# Patient Record
Sex: MALE | Race: WHITE | HISPANIC OR LATINO | Employment: OTHER | URBAN - METROPOLITAN AREA
[De-identification: names, ages, dates, MRNs, and addresses within clinical notes are randomized per-mention and may not be internally consistent; named-entity substitution may affect disease eponyms.]

---

## 2018-12-17 ENCOUNTER — HOSPITAL ENCOUNTER (OUTPATIENT)
Facility: HOSPITAL | Age: 73
Discharge: HOME | End: 2018-12-18
Attending: EMERGENCY MEDICINE | Admitting: EMERGENCY MEDICINE
Payer: MEDICARE

## 2018-12-17 DIAGNOSIS — R06.02 SOB (SHORTNESS OF BREATH): ICD-10-CM

## 2018-12-17 DIAGNOSIS — F02.818 DEMENTIA DUE TO PARKINSON'S DISEASE WITH BEHAVIORAL DISTURBANCE: Chronic | ICD-10-CM

## 2018-12-17 DIAGNOSIS — N40.1 BPH WITH URINARY OBSTRUCTION: ICD-10-CM

## 2018-12-17 DIAGNOSIS — R41.82 ALTERED MENTAL STATUS: ICD-10-CM

## 2018-12-17 DIAGNOSIS — Z86.73 HISTORY OF CVA (CEREBROVASCULAR ACCIDENT): Chronic | ICD-10-CM

## 2018-12-17 DIAGNOSIS — G20.A1 DEMENTIA DUE TO PARKINSON'S DISEASE WITH BEHAVIORAL DISTURBANCE: Chronic | ICD-10-CM

## 2018-12-17 DIAGNOSIS — N41.0 ACUTE PROSTATITIS: Primary | ICD-10-CM

## 2018-12-17 DIAGNOSIS — G93.40 ACUTE ENCEPHALOPATHY: ICD-10-CM

## 2018-12-17 DIAGNOSIS — R32 URINARY INCONTINENCE, UNSPECIFIED TYPE: ICD-10-CM

## 2018-12-17 DIAGNOSIS — N13.8 BPH WITH URINARY OBSTRUCTION: ICD-10-CM

## 2018-12-17 LAB
BASOPHILS # BLD AUTO: 0.01 K/UL
BASOPHILS NFR BLD: 0.1 %
DIFFERENTIAL METHOD: ABNORMAL
EOSINOPHIL # BLD AUTO: 0 K/UL
EOSINOPHIL NFR BLD: 0.1 %
ERYTHROCYTE [DISTWIDTH] IN BLOOD BY AUTOMATED COUNT: 13.2 %
HCT VFR BLD AUTO: 39.6 %
HGB BLD-MCNC: 13.2 G/DL
INR PPP: 1.1
LYMPHOCYTES # BLD AUTO: 1 K/UL
LYMPHOCYTES NFR BLD: 6.6 %
MCH RBC QN AUTO: 30.3 PG
MCHC RBC AUTO-ENTMCNC: 33.3 G/DL
MCV RBC AUTO: 91 FL
MONOCYTES # BLD AUTO: 1.4 K/UL
MONOCYTES NFR BLD: 9.8 %
NEUTROPHILS # BLD AUTO: 11.9 K/UL
NEUTROPHILS NFR BLD: 83.4 %
PLATELET # BLD AUTO: 218 K/UL
PMV BLD AUTO: 10.6 FL
POCT GLUCOSE: 87 MG/DL (ref 70–110)
PROTHROMBIN TIME: 11.5 SEC
RBC # BLD AUTO: 4.36 M/UL
WBC # BLD AUTO: 14.31 K/UL

## 2018-12-17 PROCEDURE — 83735 ASSAY OF MAGNESIUM: CPT

## 2018-12-17 PROCEDURE — 80053 COMPREHEN METABOLIC PANEL: CPT

## 2018-12-17 PROCEDURE — 25000003 PHARM REV CODE 250: Performed by: EMERGENCY MEDICINE

## 2018-12-17 PROCEDURE — 83605 ASSAY OF LACTIC ACID: CPT

## 2018-12-17 PROCEDURE — 93005 ELECTROCARDIOGRAM TRACING: CPT

## 2018-12-17 PROCEDURE — 83690 ASSAY OF LIPASE: CPT

## 2018-12-17 PROCEDURE — 99285 EMERGENCY DEPT VISIT HI MDM: CPT | Mod: 25

## 2018-12-17 PROCEDURE — 96361 HYDRATE IV INFUSION ADD-ON: CPT

## 2018-12-17 PROCEDURE — 87040 BLOOD CULTURE FOR BACTERIA: CPT

## 2018-12-17 PROCEDURE — 82962 GLUCOSE BLOOD TEST: CPT

## 2018-12-17 PROCEDURE — 84484 ASSAY OF TROPONIN QUANT: CPT

## 2018-12-17 PROCEDURE — 83880 ASSAY OF NATRIURETIC PEPTIDE: CPT

## 2018-12-17 PROCEDURE — 85025 COMPLETE CBC W/AUTO DIFF WBC: CPT

## 2018-12-17 PROCEDURE — 87400 INFLUENZA A/B EACH AG IA: CPT | Mod: 59

## 2018-12-17 PROCEDURE — 81000 URINALYSIS NONAUTO W/SCOPE: CPT

## 2018-12-17 PROCEDURE — 93010 ELECTROCARDIOGRAM REPORT: CPT | Mod: ,,, | Performed by: INTERNAL MEDICINE

## 2018-12-17 PROCEDURE — 85610 PROTHROMBIN TIME: CPT

## 2018-12-17 RX ADMIN — SODIUM CHLORIDE 1362 ML: 0.9 INJECTION, SOLUTION INTRAVENOUS at 11:12

## 2018-12-18 VITALS
OXYGEN SATURATION: 98 % | RESPIRATION RATE: 18 BRPM | WEIGHT: 105.81 LBS | HEART RATE: 59 BPM | HEIGHT: 66 IN | DIASTOLIC BLOOD PRESSURE: 66 MMHG | BODY MASS INDEX: 17 KG/M2 | SYSTOLIC BLOOD PRESSURE: 119 MMHG | TEMPERATURE: 97 F

## 2018-12-18 PROBLEM — R35.0 URINARY FREQUENCY: Status: ACTIVE | Noted: 2018-12-18

## 2018-12-18 PROBLEM — N41.0 ACUTE PROSTATITIS: Status: ACTIVE | Noted: 2018-12-18

## 2018-12-18 PROBLEM — N40.1 BPH WITH URINARY OBSTRUCTION: Status: ACTIVE | Noted: 2018-12-18

## 2018-12-18 PROBLEM — F03.918 DEMENTIA WITH BEHAVIORAL DISTURBANCE: Chronic | Status: ACTIVE | Noted: 2018-12-18

## 2018-12-18 PROBLEM — F03.918 DEMENTIA WITH BEHAVIORAL DISTURBANCE: Status: ACTIVE | Noted: 2018-12-18

## 2018-12-18 PROBLEM — G93.40 ACUTE ENCEPHALOPATHY: Status: ACTIVE | Noted: 2018-12-18

## 2018-12-18 PROBLEM — Z86.73 HISTORY OF CVA (CEREBROVASCULAR ACCIDENT): Chronic | Status: ACTIVE | Noted: 2018-12-18

## 2018-12-18 PROBLEM — N13.8 BPH WITH URINARY OBSTRUCTION: Status: ACTIVE | Noted: 2018-12-18

## 2018-12-18 LAB
ALBUMIN SERPL BCP-MCNC: 3 G/DL
ALBUMIN SERPL BCP-MCNC: 3.7 G/DL
ALP SERPL-CCNC: 107 U/L
ALP SERPL-CCNC: 121 U/L
ALT SERPL W/O P-5'-P-CCNC: 37 U/L
ALT SERPL W/O P-5'-P-CCNC: 43 U/L
ANION GAP SERPL CALC-SCNC: 11 MMOL/L
ANION GAP SERPL CALC-SCNC: 9 MMOL/L
AST SERPL-CCNC: 106 U/L
AST SERPL-CCNC: 90 U/L
BASOPHILS # BLD AUTO: 0 K/UL
BASOPHILS NFR BLD: 0 %
BILIRUB SERPL-MCNC: 1.5 MG/DL
BILIRUB SERPL-MCNC: 1.9 MG/DL
BILIRUB UR QL STRIP: NEGATIVE
BNP SERPL-MCNC: 80 PG/ML
BUN SERPL-MCNC: 21 MG/DL
BUN SERPL-MCNC: 26 MG/DL
CALCIUM SERPL-MCNC: 8.2 MG/DL
CALCIUM SERPL-MCNC: 9.2 MG/DL
CHLORIDE SERPL-SCNC: 104 MMOL/L
CHLORIDE SERPL-SCNC: 110 MMOL/L
CLARITY UR: CLEAR
CO2 SERPL-SCNC: 23 MMOL/L
CO2 SERPL-SCNC: 27 MMOL/L
COLOR UR: YELLOW
COMPLEXED PSA SERPL-MCNC: 13.4 NG/ML
CREAT SERPL-MCNC: 0.8 MG/DL
CREAT SERPL-MCNC: 0.9 MG/DL
DIFFERENTIAL METHOD: ABNORMAL
EOSINOPHIL # BLD AUTO: 0 K/UL
EOSINOPHIL NFR BLD: 0.2 %
ERYTHROCYTE [DISTWIDTH] IN BLOOD BY AUTOMATED COUNT: 13.3 %
EST. GFR  (AFRICAN AMERICAN): >60 ML/MIN/1.73 M^2
EST. GFR  (AFRICAN AMERICAN): >60 ML/MIN/1.73 M^2
EST. GFR  (NON AFRICAN AMERICAN): >60 ML/MIN/1.73 M^2
EST. GFR  (NON AFRICAN AMERICAN): >60 ML/MIN/1.73 M^2
FLUAV AG SPEC QL IA: NEGATIVE
FLUBV AG SPEC QL IA: NEGATIVE
GLUCOSE SERPL-MCNC: 85 MG/DL
GLUCOSE SERPL-MCNC: 89 MG/DL
GLUCOSE UR QL STRIP: NEGATIVE
GRAN CASTS #/AREA URNS LPF: 1 /LPF
HCT VFR BLD AUTO: 37.5 %
HGB BLD-MCNC: 12.4 G/DL
HGB UR QL STRIP: ABNORMAL
HYALINE CASTS #/AREA URNS LPF: 4 /LPF
KETONES UR QL STRIP: ABNORMAL
LACTATE SERPL-SCNC: 1.8 MMOL/L
LEUKOCYTE ESTERASE UR QL STRIP: NEGATIVE
LIPASE SERPL-CCNC: 82 U/L
LYMPHOCYTES # BLD AUTO: 1 K/UL
LYMPHOCYTES NFR BLD: 7.7 %
MAGNESIUM SERPL-MCNC: 2.1 MG/DL
MAGNESIUM SERPL-MCNC: 2.3 MG/DL
MCH RBC QN AUTO: 30.2 PG
MCHC RBC AUTO-ENTMCNC: 33.1 G/DL
MCV RBC AUTO: 91 FL
MICROSCOPIC COMMENT: ABNORMAL
MONOCYTES # BLD AUTO: 1.4 K/UL
MONOCYTES NFR BLD: 11.3 %
NEUTROPHILS # BLD AUTO: 10.4 K/UL
NEUTROPHILS NFR BLD: 80.8 %
NITRITE UR QL STRIP: NEGATIVE
NON-SQ EPI CELLS #/AREA URNS HPF: 1 /HPF
PH UR STRIP: 5 [PH] (ref 5–8)
PHOSPHATE SERPL-MCNC: 2.2 MG/DL
PLATELET # BLD AUTO: 196 K/UL
PMV BLD AUTO: 10.1 FL
POTASSIUM SERPL-SCNC: 3.7 MMOL/L
POTASSIUM SERPL-SCNC: 3.9 MMOL/L
PROT SERPL-MCNC: 6.2 G/DL
PROT SERPL-MCNC: 7.3 G/DL
PROT UR QL STRIP: NEGATIVE
RBC # BLD AUTO: 4.11 M/UL
RBC #/AREA URNS HPF: 5 /HPF (ref 0–4)
SODIUM SERPL-SCNC: 142 MMOL/L
SODIUM SERPL-SCNC: 142 MMOL/L
SP GR UR STRIP: 1.02 (ref 1–1.03)
SPECIMEN SOURCE: NORMAL
SQUAMOUS #/AREA URNS HPF: 1 /HPF
TROPONIN I SERPL DL<=0.01 NG/ML-MCNC: <0.006 NG/ML
TROPONIN I SERPL DL<=0.01 NG/ML-MCNC: <0.006 NG/ML
URN SPEC COLLECT METH UR: ABNORMAL
UROBILINOGEN UR STRIP-ACNC: ABNORMAL EU/DL
WBC # BLD AUTO: 12.8 K/UL
WBC #/AREA URNS HPF: 2 /HPF (ref 0–5)
WBC CASTS #/AREA URNS LPF: ABNORMAL /LPF

## 2018-12-18 PROCEDURE — 85025 COMPLETE CBC W/AUTO DIFF WBC: CPT

## 2018-12-18 PROCEDURE — 25000003 PHARM REV CODE 250: Performed by: EMERGENCY MEDICINE

## 2018-12-18 PROCEDURE — 25500020 PHARM REV CODE 255: Performed by: EMERGENCY MEDICINE

## 2018-12-18 PROCEDURE — 80053 COMPREHEN METABOLIC PANEL: CPT

## 2018-12-18 PROCEDURE — G0378 HOSPITAL OBSERVATION PER HR: HCPCS

## 2018-12-18 PROCEDURE — 36415 COLL VENOUS BLD VENIPUNCTURE: CPT

## 2018-12-18 PROCEDURE — 94761 N-INVAS EAR/PLS OXIMETRY MLT: CPT

## 2018-12-18 PROCEDURE — 25000003 PHARM REV CODE 250: Performed by: INTERNAL MEDICINE

## 2018-12-18 PROCEDURE — 96361 HYDRATE IV INFUSION ADD-ON: CPT

## 2018-12-18 PROCEDURE — 96365 THER/PROPH/DIAG IV INF INIT: CPT

## 2018-12-18 PROCEDURE — 84100 ASSAY OF PHOSPHORUS: CPT

## 2018-12-18 PROCEDURE — 25000003 PHARM REV CODE 250: Performed by: NURSE PRACTITIONER

## 2018-12-18 PROCEDURE — 83735 ASSAY OF MAGNESIUM: CPT

## 2018-12-18 PROCEDURE — 51798 US URINE CAPACITY MEASURE: CPT

## 2018-12-18 PROCEDURE — 99203 OFFICE O/P NEW LOW 30 MIN: CPT | Mod: ,,, | Performed by: UROLOGY

## 2018-12-18 PROCEDURE — 63600175 PHARM REV CODE 636 W HCPCS: Performed by: EMERGENCY MEDICINE

## 2018-12-18 PROCEDURE — 84153 ASSAY OF PSA TOTAL: CPT

## 2018-12-18 RX ORDER — CIPROFLOXACIN 500 MG/1
500 TABLET ORAL EVERY 12 HOURS
Qty: 28 TABLET | Refills: 0 | Status: ON HOLD | OUTPATIENT
Start: 2018-12-18 | End: 2019-01-11

## 2018-12-18 RX ORDER — ACETAMINOPHEN 500 MG
500 TABLET ORAL EVERY 6 HOURS PRN
Status: DISCONTINUED | OUTPATIENT
Start: 2018-12-18 | End: 2018-12-18 | Stop reason: HOSPADM

## 2018-12-18 RX ORDER — FAMOTIDINE 20 MG/1
20 TABLET, FILM COATED ORAL 2 TIMES DAILY
Status: CANCELLED | OUTPATIENT
Start: 2018-12-18

## 2018-12-18 RX ORDER — AMOXICILLIN 250 MG
1 CAPSULE ORAL 2 TIMES DAILY PRN
Status: DISCONTINUED | OUTPATIENT
Start: 2018-12-18 | End: 2018-12-18 | Stop reason: HOSPADM

## 2018-12-18 RX ORDER — CIPROFLOXACIN 500 MG/1
500 TABLET ORAL EVERY 12 HOURS
Status: DISCONTINUED | OUTPATIENT
Start: 2018-12-18 | End: 2018-12-18 | Stop reason: HOSPADM

## 2018-12-18 RX ORDER — SODIUM CHLORIDE 9 MG/ML
INJECTION, SOLUTION INTRAVENOUS CONTINUOUS
Status: DISCONTINUED | OUTPATIENT
Start: 2018-12-18 | End: 2018-12-18

## 2018-12-18 RX ORDER — ENOXAPARIN SODIUM 100 MG/ML
40 INJECTION SUBCUTANEOUS EVERY 24 HOURS
Status: DISCONTINUED | OUTPATIENT
Start: 2018-12-18 | End: 2018-12-18 | Stop reason: HOSPADM

## 2018-12-18 RX ORDER — ATORVASTATIN CALCIUM 40 MG/1
40 TABLET, FILM COATED ORAL DAILY
COMMUNITY

## 2018-12-18 RX ORDER — PHENAZOPYRIDINE HYDROCHLORIDE 100 MG/1
100 TABLET, FILM COATED ORAL 3 TIMES DAILY PRN
Qty: 15 TABLET | Refills: 0 | Status: SHIPPED | OUTPATIENT
Start: 2018-12-18 | End: 2018-12-23

## 2018-12-18 RX ORDER — TAMSULOSIN HYDROCHLORIDE 0.4 MG/1
0.4 CAPSULE ORAL DAILY
Status: DISCONTINUED | OUTPATIENT
Start: 2018-12-18 | End: 2018-12-18 | Stop reason: HOSPADM

## 2018-12-18 RX ORDER — ONDANSETRON 2 MG/ML
8 INJECTION INTRAMUSCULAR; INTRAVENOUS EVERY 8 HOURS PRN
Status: DISCONTINUED | OUTPATIENT
Start: 2018-12-18 | End: 2018-12-18 | Stop reason: HOSPADM

## 2018-12-18 RX ORDER — DEXTROMETHORPHAN POLISTIREX 30 MG/5 ML
1 SUSPENSION, EXTENDED RELEASE 12 HR ORAL ONCE
Status: COMPLETED | OUTPATIENT
Start: 2018-12-18 | End: 2018-12-18

## 2018-12-18 RX ORDER — RAMELTEON 8 MG/1
8 TABLET ORAL NIGHTLY PRN
Status: DISCONTINUED | OUTPATIENT
Start: 2018-12-18 | End: 2018-12-18 | Stop reason: HOSPADM

## 2018-12-18 RX ORDER — TAMSULOSIN HYDROCHLORIDE 0.4 MG/1
0.4 CAPSULE ORAL DAILY
Qty: 30 CAPSULE | Refills: 3 | Status: SHIPPED | OUTPATIENT
Start: 2018-12-19 | End: 2019-12-19

## 2018-12-18 RX ADMIN — MINERAL OIL 1 ENEMA: 100 ENEMA RECTAL at 09:12

## 2018-12-18 RX ADMIN — CIPROFLOXACIN HYDROCHLORIDE 500 MG: 500 TABLET, FILM COATED ORAL at 09:12

## 2018-12-18 RX ADMIN — SODIUM CHLORIDE 1000 ML: 0.9 INJECTION, SOLUTION INTRAVENOUS at 12:12

## 2018-12-18 RX ADMIN — CEFTRIAXONE 1 G: 1 INJECTION, SOLUTION INTRAVENOUS at 03:12

## 2018-12-18 RX ADMIN — SODIUM CHLORIDE: 0.9 INJECTION, SOLUTION INTRAVENOUS at 05:12

## 2018-12-18 RX ADMIN — RAMELTEON 8 MG: 8 TABLET, FILM COATED ORAL at 05:12

## 2018-12-18 RX ADMIN — IOHEXOL 70 ML: 350 INJECTION, SOLUTION INTRAVENOUS at 02:12

## 2018-12-18 RX ADMIN — TAMSULOSIN HYDROCHLORIDE 0.4 MG: 0.4 CAPSULE ORAL at 09:12

## 2018-12-18 NOTE — HPI
Abnormal Prostate on CT  He developed urinary frequency and urinary incontinence.  He is unable to provide a history.  History is from his son at the bedside.  The change in voiding symptoms seems to be sudden.  No fever or complaints of pain.

## 2018-12-18 NOTE — PROGRESS NOTES
Follow-up Information     [facility]. Go on 12/20/2018.    Why:  Outpatient Services, please arrive at 3:30 PM. PCP will refer to urology.  Contact information:  [place] [id number]  [phone]               PLEASE BRING TO ALL FOLLOW UP APPOINTMENTS:   A COPY YOUR DISCHARGE INSTRUCTIONS, Any new MEDICINES YOU ARE CURRENTLY TAKING IN THEIR ORIGINAL BOTTLES  And IDENTIFICATION AND INSURANCE CARD     **PLEASE ARRIVE 15 MINUTES AHEAD OF SCHEDULED APPOINTMENT TIME   ++PLEASE CALL 24 HOURS IN ADVANCE IF YOU MUST RESCHEDULE YOUR APPOINTMENT DAY AND/OR TIME     [facility]    WRITTEN HEALTHCARE AND DISCHARGE INFORMATION                        Help at Home           [phone]  After discharge for assistance [facility] On Call Nurse Care Line 24/7  Assistance    Things You are responsible For To Manage Your Care At Home:  1.    Getting your prescriptions filled   2.    Taking your medications as directed, DO NOT MISS ANY DOSES!  3.    Going to your follow-up doctor appointment. This is important because it  allow the doctor to monitor your progress and determine if  any changes need to made to your treatment plan.     Thank you for choosing [facility] for your care.  Please answer any calls you may receive from [facility] we want to continue to support you as you manage your healthcare needs. [facility] is happy to have the opportunity to serve you.     Sincerely,  Your [facility] Team,  RAY [place], [place];  [phone]

## 2018-12-18 NOTE — PROGRESS NOTES
"EDUCATION:  TN provided with pt's son, Miesha, educational information on GI Disorders.  Information reviewed and placed in :My Healthcare Packet" to be brought home for pt to use as resource after discharge.  Information included:  signs and symptoms to look for and call the doctor if experiencing, and symptoms that may indicate a medical emergency: CALL 911.      All questions answered.  Teach back method used.    Patient stated, "I will contact on call nurses ".    TN informed floor nurse, Fernanda, care management is complete and can proceed with discharge teaching.      "

## 2018-12-18 NOTE — SUBJECTIVE & OBJECTIVE
Past Medical History:   Diagnosis Date    Dementia     Stroke        History reviewed. No pertinent surgical history.    Review of patient's allergies indicates:  No Known Allergies    Family History     None          Tobacco Use    Smoking status: Never Smoker    Smokeless tobacco: Never Used   Substance and Sexual Activity    Alcohol use: No     Frequency: Never    Drug use: No    Sexual activity: No     Partners: Female       Review of Systems   Unable to perform ROS: Mental status change       Objective:     Temp:  [97.3 °F (36.3 °C)-98.7 °F (37.1 °C)] 97.3 °F (36.3 °C)  Pulse:  [57-73] 57  Resp:  [14-28] 14  SpO2:  [95 %-99 %] 99 %  BP: (114-165)/(62-82) 135/67     Body mass index is 17.08 kg/m².            Drains          None          Physical Exam   Nursing note and vitals reviewed.  Constitutional: He is oriented to person, place, and time. He appears well-developed and well-nourished.   HENT:   Head: Normocephalic.   Eyes: Conjunctivae are normal.   Neck: Normal range of motion. No tracheal deviation present. No thyromegaly present.   Cardiovascular: Normal rate and normal heart sounds.    Pulmonary/Chest: Effort normal and breath sounds normal. No respiratory distress. He has no wheezes.   Abdominal: Soft. He exhibits no distension and no mass. There is no hepatosplenomegaly. There is no tenderness. There is no rebound, no guarding and no CVA tenderness. No hernia. Hernia confirmed negative in the right inguinal area and confirmed negative in the left inguinal area.   Genitourinary: Rectum normal, testes normal and penis normal. Rectal exam shows no external hemorrhoid, no mass and no tenderness. Prostate is enlarged. Prostate is not tender. Right testis shows no mass and no tenderness. Left testis shows no mass and no tenderness. Uncircumcised.       Musculoskeletal: He exhibits no edema or tenderness.   Lymphadenopathy: No inguinal adenopathy noted on the right or left side.   Neurological: He is  alert and oriented to person, place, and time.   Skin: Skin is warm and dry. No rash noted. No erythema.     Psychiatric: He has a normal mood and affect. His behavior is normal. Judgment and thought content normal.       Significant Labs:    BMP:  Recent Labs   Lab 12/17/18 2320 12/18/18  0549    142   K 3.9 3.7    110   CO2 27 23   BUN 26* 21   CREATININE 0.9 0.8   CALCIUM 9.2 8.2*       CBC:  Recent Labs   Lab 12/17/18 2320 12/18/18  0549   WBC 14.31* 12.80*   HGB 13.2* 12.4*   HCT 39.6* 37.5*    196     No results found for: PSA      Blood Culture:   Recent Labs   Lab 12/17/18 2323 12/17/18 2327   LABBLOO No Growth to date No Growth to date     Urine Culture: No results for input(s): LABURIN in the last 168 hours.    Significant Imaging:  CT: I have reviewed all results within the past 24 hours and my personal findings are:  enlarged nodular prostate

## 2018-12-18 NOTE — SUBJECTIVE & OBJECTIVE
Past Medical History:   Diagnosis Date    Stroke        History reviewed. No pertinent surgical history.    Review of patient's allergies indicates:  No Known Allergies    No current facility-administered medications on file prior to encounter.      No current outpatient medications on file prior to encounter.     Family History     None        Tobacco Use    Smoking status: Never Smoker   Substance and Sexual Activity    Alcohol use: No     Frequency: Never    Drug use: No    Sexual activity: No     Partners: Female     Review of Systems   Unable to perform ROS: Dementia     Objective:     Vital Signs (Most Recent):  Temp: 98 °F (36.7 °C) (12/18/18 0509)  Pulse: 62 (12/18/18 0509)  Resp: 18 (12/18/18 0509)  BP: (!) 165/80 (12/18/18 0509)  SpO2: 96 % (12/18/18 0509) Vital Signs (24h Range):  Temp:  [98 °F (36.7 °C)-98.7 °F (37.1 °C)] 98 °F (36.7 °C)  Pulse:  [62-73] 62  Resp:  [14-28] 18  SpO2:  [95 %-98 %] 96 %  BP: (114-165)/(65-82) 165/80     Weight: 48 kg (105 lb 13.1 oz)  Body mass index is 17.08 kg/m².    Physical Exam   Constitutional: He appears well-developed. No distress.   cachetic   HENT:   Head: Normocephalic and atraumatic.   Right Ear: External ear normal.   Left Ear: External ear normal.   Nose: Nose normal.   Eyes: Right eye exhibits no discharge. Left eye exhibits no discharge.   Neck: Normal range of motion.   Cardiovascular: Normal rate, regular rhythm, normal heart sounds and intact distal pulses. Exam reveals no gallop and no friction rub.   No murmur heard.  Pulmonary/Chest: Effort normal and breath sounds normal. No stridor. No respiratory distress. He has no wheezes. He has no rales. He exhibits no tenderness.   Abdominal: Soft. Bowel sounds are normal. He exhibits no distension. There is no tenderness. There is no rebound and no guarding.   Musculoskeletal: Normal range of motion. He exhibits no edema.   Neurological:   He is awake, alert, pleasant, cooperative, follows commands, but  oriented only to person   Skin: Skin is warm and dry. He is not diaphoretic. No erythema.   Nursing note and vitals reviewed.          Significant Labs: All pertinent labs within the past 24 hours have been reviewed.    Significant Imaging: I have reviewed and interpreted all pertinent imaging results/findings within the past 24 hours.

## 2018-12-18 NOTE — ED PROVIDER NOTES
Encounter Date: 12/17/2018       History     Chief Complaint   Patient presents with    Altered Mental Status     Family reports patient w/ altered mental status. Noted patient had urinated on himself at 11am. Family reports decreased appetite over the past few days. Patient disoriented.     Patient presents via EMS for evaluation of altered mental status.  Apparently patient has been eating and drinking less over 2-3 day.  Tonight he was weak and urinated on his couch.  This is unusual behavior for him.  He has evidence of recent falls on exam according to EMS.  He has bruising to the left side of his head, his left shoulder, and his right knee.  Patient does not have any complaints but the history is limited due to the patient's confusion.  No reported fever.  Patient does complain of feeling cold.  No reported nausea or vomiting. No reported diarrhea.  He denies chest pain or abdominal pain. he denies cough.  No reported rash. He denies headache. No reported seizure activity.          Review of patient's allergies indicates:  No Known Allergies  No past medical history on file.  No past surgical history on file.  No family history on file.  Social History     Tobacco Use    Smoking status: Not on file   Substance Use Topics    Alcohol use: Not on file    Drug use: Not on file     Review of Systems   Unable to perform ROS: Mental status change   Constitutional: Positive for chills. Negative for diaphoresis and fever.   HENT: Negative for drooling.    Respiratory: Negative for cough, shortness of breath, wheezing and stridor.    Cardiovascular: Negative for chest pain and leg swelling.   Gastrointestinal: Negative for abdominal pain, diarrhea, nausea and vomiting.   Musculoskeletal: Positive for gait problem. Negative for back pain and joint swelling.   Neurological: Positive for tremors, speech difficulty and weakness. Negative for seizures and syncope.   Psychiatric/Behavioral: Positive for confusion and  decreased concentration.       Physical Exam     Initial Vitals [12/17/18 2218]   BP Pulse Resp Temp SpO2   136/77 73 16 98.7 °F (37.1 °C) 95 %      MAP       --         Physical Exam    Nursing note and vitals reviewed.  Constitutional: He appears well-developed and well-nourished. He is not diaphoretic. No distress.   Tremulous.   HENT:   Head: Normocephalic and atraumatic.   Right Ear: External ear normal.   Left Ear: External ear normal.   Erythema and to left temporal and frontal area consistent with recent injury. No ecchymosis or hematoma.  No tenderness. Mucous membranes are dry.   Eyes: Conjunctivae and EOM are normal. Pupils are equal, round, and reactive to light. Right eye exhibits no discharge. Left eye exhibits no discharge. No scleral icterus.   Neck: Normal range of motion. Neck supple. No thyromegaly present. No tracheal deviation present.   No cervical spine tenderness.   Cardiovascular: Normal rate, regular rhythm and normal heart sounds.   No murmur heard.  Pulmonary/Chest: Breath sounds normal. No stridor. No respiratory distress. He has no wheezes. He has no rhonchi. He has no rales.   Abdominal: Soft. He exhibits no distension. There is no tenderness. There is no rebound and no guarding.   Musculoskeletal: Normal range of motion. He exhibits no edema or tenderness.   No obvious or orthopedic injury to upper or lower extremities. No joint effusions noted. No skeletal deformities noted. No back tenderness.   Neurological: He is alert. He has normal strength. No cranial nerve deficit.   Patient unable to cooperate with sensory exam.  Unable to cooperate with cerebellar exam.  Patient oriented to person only.   Skin: Skin is warm and dry. No rash noted. There is pallor.   Contusion to the anterior right knee.  Contusion to lateral anterior left shoulder.   Psychiatric:   Unable to cooperate with exam.  Flat affect. Patient confused.         ED Course   Procedures  Labs Reviewed   CULTURE, BLOOD    CULTURE, BLOOD   COMPREHENSIVE METABOLIC PANEL   CBC W/ AUTO DIFFERENTIAL   B-TYPE NATRIURETIC PEPTIDE   TROPONIN I   URINALYSIS, REFLEX TO URINE CULTURE   LACTIC ACID, PLASMA   URINALYSIS, REFLEX TO URINE CULTURE   INFLUENZA A AND B ANTIGEN   MAGNESIUM   PROTIME-INR   TROPONIN I   POCT GLUCOSE MONITORING CONTINUOUS     EKG Readings: (Independently Interpreted)   Heart Rate: 68. Ectopy: No Ectopy. Conduction: Normal. ST Segments: Non-Specific ST Segment Depression. Axis: Normal.   Biphasic T-waves in V3-5.  Possible ischemia.       Imaging Results          X-Ray Chest AP Portable (In process)                CT Head Without Contrast (In process)                  Medical Decision Making:   Differential Diagnosis:   Sepsis, dementia, renal failure, CVA, closed head injury.  Clinical Tests:   Lab Tests: Reviewed  The following lab test(s) were unremarkable: CBC, CMP and Urinalysis  Radiological Study: Reviewed  Medical Tests: Reviewed  ED Management:  Urinalysis and chest x-ray negative for any significant infection.  Patient denies headache. Patient's daughter states that he has been complaining of abdominal pain since she has been in the emergency room with him.  Patient's repeat abdominal exam remained remains benign.  He currently denies pain. Given the patient has suffers from dementia his history is limited.  Will perform CT abdomen pelvis to rule out intra-abdominal pathology as the source of his elevated white count and confusion.    CT negative for acute intra-abdominal pathology.  Patient noted to have enlarged prostate.  Given scant urinary findings I suspect prostatitis is etiology of his pain and incontinence.  Will admit on IV antibiotics and IV fluid.  Discussed with Dr. Cash.  Discussed results family.                      Clinical Impression:   The primary encounter diagnosis was Acute prostatitis. Diagnoses of Altered mental status, SOB (shortness of breath), and Urinary incontinence, unspecified  type were also pertinent to this visit.      Disposition:   Disposition: Admitted  Condition: Stable                        Marquis Llanos MD  12/18/18 0324

## 2018-12-18 NOTE — ASSESSMENT & PLAN NOTE
Etiology is yet to be determined.  CT-head was negative for acute intracranial abnormalities.  I have reviewed the chest X-ray and it reveals hyperexpanded lung fields but without infiltrates or pleural effusions.  Patient is without fevers and meningismus.  Urinalysis was benign; serum glucose was 89 mg/dL; electrolytes are stable; and there is mild evidence of uremia with a BUN of 26.  There is no evidence of thyroidal disease; skin is intact; and there are no rashes.  Patient is hemodynamically-stable and there has not been recent medication changes.  Clinical suspicion of CVA or subclinical seizures are low.  Will plan to perform neurological testing every 4 hours with frequent re-orientation.  Will also minimize medications and place fall precautions.

## 2018-12-18 NOTE — HOSPITAL COURSE
73 y.o. WM placed in observation 2/2 increased confusion and complaints of lower abdominal pain. Patient found on CT to have large lobular prostate gland suspicious for BPH or prostatic carcinoma. Started on empiric ABX to cover presumed prostatitis and Flomax. PSA also ordered and pending as well as urine culture which has NGTD. He received bowel evacuation in the hospital and appears to have responded well since initiation of treatment. He tells me that is no longer in pain. Seen by urology who agreed with plan and ordered post void bladder scan which was under 200. Patient will be discharged to home - he is voiding well and will discharge to home with close follow up with urology. He is a [facility] patient and follow up has been scheduled.

## 2018-12-18 NOTE — HPI
Mr. Hugo Lockhart is a 73 y.o. male with dementia and history of CVA who presents to [facility] ED with complaints of confusion for one day.  Patient has been noted by family to have poorer appetite in the last 2-3 days and appeared confused today.  He was incontinent of urine today which is unusual for him.  Further history is limited at this time.

## 2018-12-18 NOTE — CONSULTS
[facility]  Urology  Consult Note    Patient Name: Hugo Lockhart  MRN: [id number]  Admission Date: 12/17/2018  Hospital Length of Stay: 0   Code Status: Full Code   Attending Provider: Sugar Alcaraz MD   Consulting Provider: ITALIA Vasques MD  Primary Care Physician: [facility]  Principal Problem:Acute encephalopathy    Inpatient consult to Urology  Consult performed by: AMELIA Vasques MD  Consult ordered by: KELLE Edgar          Subjective:     HPI:  Abnormal Prostate on CT  He developed urinary frequency and urinary incontinence.  He is unable to provide a history.  History is from his son at the bedside.  The change in voiding symptoms seems to be sudden.  No fever or complaints of pain.     Past Medical History:   Diagnosis Date    Dementia     Stroke        History reviewed. No pertinent surgical history.    Review of patient's allergies indicates:  No Known Allergies    Family History     None          Tobacco Use    Smoking status: Never Smoker    Smokeless tobacco: Never Used   Substance and Sexual Activity    Alcohol use: No     Frequency: Never    Drug use: No    Sexual activity: No     Partners: Female       Review of Systems   Unable to perform ROS: Mental status change       Objective:     Temp:  [97.3 °F (36.3 °C)-98.7 °F (37.1 °C)] 97.3 °F (36.3 °C)  Pulse:  [57-73] 57  Resp:  [14-28] 14  SpO2:  [95 %-99 %] 99 %  BP: (114-165)/(62-82) 135/67     Body mass index is 17.08 kg/m².            Drains          None          Physical Exam   Nursing note and vitals reviewed.  Constitutional: He is oriented to person, place, and time. He appears well-developed and well-nourished.   HENT:   Head: Normocephalic.   Eyes: Conjunctivae are normal.   Neck: Normal range of motion. No tracheal deviation present. No thyromegaly present.   Cardiovascular: Normal rate and normal heart sounds.    Pulmonary/Chest: Effort normal and breath sounds normal. No  respiratory distress. He has no wheezes.   Abdominal: Soft. He exhibits no distension and no mass. There is no hepatosplenomegaly. There is no tenderness. There is no rebound, no guarding and no CVA tenderness. No hernia. Hernia confirmed negative in the right inguinal area and confirmed negative in the left inguinal area.   Genitourinary: Rectum normal, testes normal and penis normal. Rectal exam shows no external hemorrhoid, no mass and no tenderness. Prostate is enlarged. Prostate is not tender. Right testis shows no mass and no tenderness. Left testis shows no mass and no tenderness. Uncircumcised.       Musculoskeletal: He exhibits no edema or tenderness.   Lymphadenopathy: No inguinal adenopathy noted on the right or left side.   Neurological: He is alert and oriented to person, place, and time.   Skin: Skin is warm and dry. No rash noted. No erythema.     Psychiatric: He has a normal mood and affect. His behavior is normal. Judgment and thought content normal.       Significant Labs:    BMP:  Recent Labs   Lab 12/17/18 2320 12/18/18  0549    142   K 3.9 3.7    110   CO2 27 23   BUN 26* 21   CREATININE 0.9 0.8   CALCIUM 9.2 8.2*       CBC:  Recent Labs   Lab 12/17/18 2320 12/18/18  0549   WBC 14.31* 12.80*   HGB 13.2* 12.4*   HCT 39.6* 37.5*    196     No results found for: PSA      Blood Culture:   Recent Labs   Lab 12/17/18  2323 12/17/18  2327   LABBLOO No Growth to date No Growth to date     Urine Culture: No results for input(s): LABURIN in the last 168 hours.    Significant Imaging:  CT: I have reviewed all results within the past 24 hours and my personal findings are:  enlarged nodular prostate                    Assessment and Plan:     Urinary frequency    Urine culture, treat x 2 weeks  Bladder scan  Place [place] for significantly elevated PVR     BPH with urinary obstruction    Flomax  PSA has been drawn, will monitor.  If he has an infection then PSA will need to be redrawn  after infection has been treated.    He will likely need a biopsy (outpatient)         VTE Risk Mitigation (From admission, onward)        Ordered     enoxaparin injection 40 mg  Daily      12/18/18 0457     IP VTE HIGH RISK PATIENT  Once      12/18/18 0457     IP VTE HIGH RISK PATIENT  Once      12/18/18 0457          Thank you for your consult. I will follow-up with patient. Please contact us if you have any additional questions.    ITALIA Vasques MD  Urology  [facility]

## 2018-12-18 NOTE — PROGRESS NOTES
[id number] [place] contacted [facility] at [phone]; spoke with hector to refer pt to an urologist. Will schedule on date of PCP appt of 12/20/18 at 3:30 PM.

## 2018-12-18 NOTE — DISCHARGE SUMMARY
[facility] Hospital Medicine  Discharge Summary      Patient Name: Hugo Lockhart  MRN: [id number]  Admission Date: 12/17/2018  Hospital Length of Stay: 0 days  Discharge Date and Time:  12/18/2018 5:30 PM  Attending Physician: Sugar Alcaraz MD   Discharging Provider: KELLE Chapin  Primary Care Provider: [facility]      HPI:   Mr. Hugo Lockhart is a 73 y.o. male with dementia and history of CVA who presents to [facility] ED with complaints of confusion for one day.  Patient has been noted by family to have poorer appetite in the last 2-3 days and appeared confused today.  He was incontinent of urine today which is unusual for him.  Further history is limited at this time.    * No surgery found *      Hospital Course:   73 y.o. WM placed in observation 2/2 increased confusion and complaints of lower abdominal pain. Patient found on CT to have large lobular prostate gland suspicious for BPH or prostatic carcinoma. Started on empiric ABX to cover presumed prostatitis and Flomax. PSA also ordered and pending as well as urine culture which has NGTD. He received bowel evacuation in the hospital and appears to have responded well since initiation of treatment. He tells me that is no longer in pain. Seen by urology who agreed with plan and ordered post void bladder scan which was under 200. Patient will be discharged to home - he is voiding well and will discharge to home with close follow up with urology. He is a [facility] patient and follow up has been scheduled.      Consults:   Consults (From admission, onward)        Status Ordering Provider     Inpatient consult to Urology  Once     Provider:  AMELIA Vasques MD    Completed KRISTINA MONGE          No new Assessment & Plan notes have been filed under this hospital service since the last note was generated.  Service: Hospital Medicine    Final Active Diagnoses:    Diagnosis Date Noted POA    PRINCIPAL PROBLEM:  Acute  encephalopathy [G93.40] 12/18/2018 Yes    Dementia with behavioral disturbance [F03.91] 12/18/2018 Yes     Chronic    History of CVA (cerebrovascular accident) [Z86.73] 12/18/2018 Not Applicable     Chronic    BPH with urinary obstruction [N40.1, N13.8] 12/18/2018 Yes    Urinary frequency [R35.0] 12/18/2018 Yes      Problems Resolved During this Admission:       Discharged Condition: stable    Disposition: Home or Self Care    Follow Up:  Follow-up Information     [facility]. Go on 12/20/2018.    Why:  Outpatient Services, please arrive at 3:30 PM. PCP will refer to urology.  Contact information:  [address] [phone]                 Patient Instructions:      Diet Cardiac     Activity as tolerated       Significant Diagnostic Studies: Labs:   CMP   Recent Labs   Lab 12/17/18 2320 12/18/18  0549    142   K 3.9 3.7    110   CO2 27 23   GLU 89 85   BUN 26* 21   CREATININE 0.9 0.8   CALCIUM 9.2 8.2*   PROT 7.3 6.2   ALBUMIN 3.7 3.0*   BILITOT 1.9* 1.5*   ALKPHOS 121 107   * 90*   ALT 43 37   ANIONGAP 11 9   ESTGFRAFRICA >60 >60   EGFRNONAA >60 >60   , CBC   Recent Labs   Lab 12/17/18 2320 12/18/18  0549   WBC 14.31* 12.80*   HGB 13.2* 12.4*   HCT 39.6* 37.5*    196   , INR   Lab Results   Component Value Date    INR 1.1 12/17/2018   , Lipid Panel No results found for: CHOL, HDL, LDLCALC, TRIG, CHOLHDL, Troponin   Recent Labs   Lab 12/17/18 2320   TROPONINI <0.006  <0.006    and A1C: No results for input(s): HGBA1C in the last 4320 hours.    Pending Diagnostic Studies:     Procedure Component Value Units Date/Time    PSA, Screening [[id number]] Collected:  12/18/18 0915    Order Status:  Sent Lab Status:  In process Updated:  12/18/18 4960    Specimen:  Blood          Medications:  Reconciled Home Medications:      Medication List      START taking these medications    ciprofloxacin HCl 500 MG tablet  Commonly known as:  CIPRO  Take 1 tablet (500 mg  total) by mouth every 12 (twelve) hours. for 14 days     phenazopyridine 100 MG tablet  Commonly known as:  PYRIDIUM  Take 1 tablet (100 mg total) by mouth 3 (three) times daily as needed for Pain.     tamsulosin 0.4 mg Cap  Commonly known as:  FLOMAX  Take 1 capsule (0.4 mg total) by mouth once daily.  Start taking on:  12/19/2018        CONTINUE taking these medications    atorvastatin 40 MG tablet  Commonly known as:  LIPITOR  Take 40 mg by mouth once daily.            Indwelling Lines/Drains at time of discharge:   Lines/Drains/Airways          None          Time spent on the discharge of patient: 45 minutes  Patient was seen and examined on the date of discharge and determined to be suitable for discharge.       JUYD Edgar, FNP-C  Hospitalist - Department of Hospital Medicine  [address] [place] [address]  Office [phone]; Pager [phone]

## 2018-12-18 NOTE — PLAN OF CARE
12/18/18 1444   Final Note   Assessment Type Final Discharge Note   Anticipated Discharge Disposition Home   What phone number can be called within the next 1-3 days to see how you are doing after discharge? (Listed in chart)   Hospital Follow Up  Appt(s) scheduled? Yes   Discharge plans and expectations educations in teach back method with documentation complete? Yes   Right Care Referral Info   Post Acute Recommendation No Care

## 2018-12-18 NOTE — ED TRIAGE NOTES
Per patients daughter patient refused to eat or drink since 1100. Patient daughter also reports patient urinated on himself and  begun to cry but couldn't pinpoint location of pain

## 2018-12-18 NOTE — H&P
[facility] Hospital Medicine  History & Physical    Patient Name: Hugo Lockhart  MRN: [id number]  Admission Date: 12/17/2018  Attending Physician: Charito Wheat MD   Primary Care Provider: No primary care provider on file.         Patient information was obtained from ER records.     Subjective:     Principal Problem:Acute encephalopathy    Chief Complaint: Confusion today.    HPI: Mr. Hugo Lockhart is a 73 y.o. male with dementia and history of CVA who presents to [facility] ED with complaints of confusion for one day.  Patient has been noted by family to have poorer appetite in the last 2-3 days and appeared confused today.  He was incontinent of urine today which is unusual for him.  Further history is limited at this time.    Chart Review:  Patient has not had any recent hospitalizations or outpatient clinic visits within the system.    Past Medical History:   Diagnosis Date    Stroke        History reviewed. No pertinent surgical history.    Review of patient's allergies indicates:  No Known Allergies    No current facility-administered medications on file prior to encounter.      No current outpatient medications on file prior to encounter.     Family History     None        Tobacco Use    Smoking status: Never Smoker   Substance and Sexual Activity    Alcohol use: No     Frequency: Never    Drug use: No    Sexual activity: No     Partners: Female     Review of Systems   Unable to perform ROS: Dementia     Objective:     Vital Signs (Most Recent):  Temp: 98 °F (36.7 °C) (12/18/18 0509)  Pulse: 62 (12/18/18 0509)  Resp: 18 (12/18/18 0509)  BP: (!) 165/80 (12/18/18 0509)  SpO2: 96 % (12/18/18 0509) Vital Signs (24h Range):  Temp:  [98 °F (36.7 °C)-98.7 °F (37.1 °C)] 98 °F (36.7 °C)  Pulse:  [62-73] 62  Resp:  [14-28] 18  SpO2:  [95 %-98 %] 96 %  BP: (114-165)/(65-82) 165/80     Weight: 48 kg (105 lb 13.1 oz)  Body mass index is 17.08 kg/m².    Physical Exam   Constitutional: He  appears well-developed. No distress.   cachetic   HENT:   Head: Normocephalic and atraumatic.   Right Ear: External ear normal.   Left Ear: External ear normal.   Nose: Nose normal.   Eyes: Right eye exhibits no discharge. Left eye exhibits no discharge.   Neck: Normal range of motion.   Cardiovascular: Normal rate, regular rhythm, normal heart sounds and intact distal pulses. Exam reveals no gallop and no friction rub.   No murmur heard.  Pulmonary/Chest: Effort normal and breath sounds normal. No stridor. No respiratory distress. He has no wheezes. He has no rales. He exhibits no tenderness.   Abdominal: Soft. Bowel sounds are normal. He exhibits no distension. There is no tenderness. There is no rebound and no guarding.   Musculoskeletal: Normal range of motion. He exhibits no edema.   Neurological:   He is awake, alert, pleasant, cooperative, follows commands, but oriented only to person   Skin: Skin is warm and dry. He is not diaphoretic. No erythema.   Nursing note and vitals reviewed.          Significant Labs: All pertinent labs within the past 24 hours have been reviewed.    Significant Imaging: I have reviewed and interpreted all pertinent imaging results/findings within the past 24 hours.    Assessment/Plan:     * Acute encephalopathy    Etiology is yet to be determined.  CT-head was negative for acute intracranial abnormalities.  I have reviewed the chest X-ray and it reveals hyperexpanded lung fields but without infiltrates or pleural effusions.  Patient is without fevers and meningismus.  Urinalysis was benign; serum glucose was 89 mg/dL; electrolytes are stable; and there is mild evidence of uremia with a BUN of 26.  There is no evidence of thyroidal disease; skin is intact; and there are no rashes.  Patient is hemodynamically-stable and there has not been recent medication changes.  Clinical suspicion of CVA or subclinical seizures are low.  Will plan to perform neurological testing every 4 hours  with frequent re-orientation.  Will also minimize medications and place fall precautions.       Dementia with behavioral disturbance    As addressed above.     History of CVA (cerebrovascular accident)    Stable; there are no acute issues.       VTE Risk Mitigation (From admission, onward)        Ordered     enoxaparin injection 40 mg  Daily      12/18/18 0457     IP VTE HIGH RISK PATIENT  Once      12/18/18 0457     IP VTE HIGH RISK PATIENT  Once      12/18/18 0457           Nereida Kamara M.D.  Staff Nocturnist  Department of Hospital Medicine  [facility]  Pager: [phone]

## 2018-12-18 NOTE — PLAN OF CARE
12/18/18 1442   Discharge Assessment   Assessment Type Discharge Planning Assessment   Pt discharged before completion of assessment.

## 2018-12-18 NOTE — ASSESSMENT & PLAN NOTE
Flomax  PSA has been drawn, will monitor.  If he has an infection then PSA will need to be redrawn after infection has been treated.    He will likely need a biopsy (outpatient)

## 2018-12-23 LAB
BACTERIA BLD CULT: NORMAL
BACTERIA BLD CULT: NORMAL

## 2019-01-02 ENCOUNTER — HOSPITAL ENCOUNTER (INPATIENT)
Facility: HOSPITAL | Age: 74
LOS: 9 days | Discharge: HOSPICE | DRG: 480 | End: 2019-01-11
Attending: EMERGENCY MEDICINE | Admitting: INTERNAL MEDICINE
Payer: MEDICARE

## 2019-01-02 DIAGNOSIS — M89.9 LYTIC BONE LESIONS ON XRAY: ICD-10-CM

## 2019-01-02 DIAGNOSIS — F03.90 DEMENTIA WITHOUT BEHAVIORAL DISTURBANCE, UNSPECIFIED DEMENTIA TYPE: ICD-10-CM

## 2019-01-02 DIAGNOSIS — M25.451 HIP SWELLING, RIGHT: ICD-10-CM

## 2019-01-02 DIAGNOSIS — M89.9 LYTIC LESION OF BONE ON X-RAY: ICD-10-CM

## 2019-01-02 DIAGNOSIS — N30.00 ACUTE CYSTITIS WITHOUT HEMATURIA: ICD-10-CM

## 2019-01-02 DIAGNOSIS — D72.829 LEUKOCYTOSIS, UNSPECIFIED TYPE: ICD-10-CM

## 2019-01-02 DIAGNOSIS — S72.141A CLOSED DISPLACED INTERTROCHANTERIC FRACTURE OF RIGHT FEMUR, INITIAL ENCOUNTER: Primary | ICD-10-CM

## 2019-01-02 PROBLEM — D63.8 ANEMIA OF CHRONIC DISEASE: Status: ACTIVE | Noted: 2019-01-02

## 2019-01-02 LAB
ALBUMIN SERPL BCP-MCNC: 2.7 G/DL
ALP SERPL-CCNC: 217 U/L
ALT SERPL W/O P-5'-P-CCNC: 61 U/L
ANION GAP SERPL CALC-SCNC: 8 MMOL/L
APTT BLDCRRT: 28 SEC
AST SERPL-CCNC: 79 U/L
BACTERIA #/AREA URNS HPF: ABNORMAL /HPF
BASOPHILS # BLD AUTO: 0.02 K/UL
BASOPHILS NFR BLD: 0.1 %
BILIRUB SERPL-MCNC: 1.2 MG/DL
BILIRUB UR QL STRIP: NEGATIVE
BUN SERPL-MCNC: 20 MG/DL
CALCIUM SERPL-MCNC: 8.6 MG/DL
CHLORIDE SERPL-SCNC: 99 MMOL/L
CLARITY UR: CLEAR
CO2 SERPL-SCNC: 31 MMOL/L
COLOR UR: ABNORMAL
CREAT SERPL-MCNC: 0.6 MG/DL
DIFFERENTIAL METHOD: ABNORMAL
EOSINOPHIL # BLD AUTO: 0 K/UL
EOSINOPHIL NFR BLD: 0.2 %
ERYTHROCYTE [DISTWIDTH] IN BLOOD BY AUTOMATED COUNT: 13.7 %
EST. GFR  (AFRICAN AMERICAN): >60 ML/MIN/1.73 M^2
EST. GFR  (NON AFRICAN AMERICAN): >60 ML/MIN/1.73 M^2
GLUCOSE SERPL-MCNC: 96 MG/DL
GLUCOSE UR QL STRIP: NEGATIVE
HCT VFR BLD AUTO: 35 %
HGB BLD-MCNC: 11.7 G/DL
HGB UR QL STRIP: NEGATIVE
INR PPP: 1.1
KETONES UR QL STRIP: ABNORMAL
LACTATE SERPL-SCNC: 1.7 MMOL/L
LEUKOCYTE ESTERASE UR QL STRIP: NEGATIVE
LYMPHOCYTES # BLD AUTO: 0.9 K/UL
LYMPHOCYTES NFR BLD: 5.8 %
MAGNESIUM SERPL-MCNC: 1.7 MG/DL
MCH RBC QN AUTO: 29.5 PG
MCHC RBC AUTO-ENTMCNC: 33.4 G/DL
MCV RBC AUTO: 88 FL
MICROSCOPIC COMMENT: ABNORMAL
MONOCYTES # BLD AUTO: 1.1 K/UL
MONOCYTES NFR BLD: 7.3 %
NEUTROPHILS # BLD AUTO: 13.3 K/UL
NEUTROPHILS NFR BLD: 87.5 %
NITRITE UR QL STRIP: POSITIVE
PH UR STRIP: 5 [PH] (ref 5–8)
PLATELET # BLD AUTO: 407 K/UL
PLATELET BLD QL SMEAR: ABNORMAL
PMV BLD AUTO: 9.6 FL
POTASSIUM SERPL-SCNC: 3.9 MMOL/L
PROT SERPL-MCNC: 5.9 G/DL
PROT UR QL STRIP: NEGATIVE
PROTHROMBIN TIME: 11.3 SEC
RBC # BLD AUTO: 3.96 M/UL
RBC #/AREA URNS HPF: 1 /HPF (ref 0–4)
SODIUM SERPL-SCNC: 138 MMOL/L
SP GR UR STRIP: 1.02 (ref 1–1.03)
SQUAMOUS #/AREA URNS HPF: 2 /HPF
URN SPEC COLLECT METH UR: ABNORMAL
UROBILINOGEN UR STRIP-ACNC: ABNORMAL EU/DL
WBC # BLD AUTO: 15.41 K/UL
WBC #/AREA URNS HPF: 1 /HPF (ref 0–5)

## 2019-01-02 PROCEDURE — 80053 COMPREHEN METABOLIC PANEL: CPT

## 2019-01-02 PROCEDURE — 83735 ASSAY OF MAGNESIUM: CPT

## 2019-01-02 PROCEDURE — 93010 EKG 12-LEAD: ICD-10-PCS | Mod: ,,, | Performed by: INTERNAL MEDICINE

## 2019-01-02 PROCEDURE — 87086 URINE CULTURE/COLONY COUNT: CPT

## 2019-01-02 PROCEDURE — 99285 EMERGENCY DEPT VISIT HI MDM: CPT

## 2019-01-02 PROCEDURE — 87040 BLOOD CULTURE FOR BACTERIA: CPT

## 2019-01-02 PROCEDURE — 25000003 PHARM REV CODE 250: Performed by: EMERGENCY MEDICINE

## 2019-01-02 PROCEDURE — 63600175 PHARM REV CODE 636 W HCPCS: Performed by: EMERGENCY MEDICINE

## 2019-01-02 PROCEDURE — 93010 ELECTROCARDIOGRAM REPORT: CPT | Mod: ,,, | Performed by: INTERNAL MEDICINE

## 2019-01-02 PROCEDURE — 11000001 HC ACUTE MED/SURG PRIVATE ROOM

## 2019-01-02 PROCEDURE — 93005 ELECTROCARDIOGRAM TRACING: CPT

## 2019-01-02 PROCEDURE — 85730 THROMBOPLASTIN TIME PARTIAL: CPT

## 2019-01-02 PROCEDURE — 85610 PROTHROMBIN TIME: CPT

## 2019-01-02 PROCEDURE — 83605 ASSAY OF LACTIC ACID: CPT

## 2019-01-02 PROCEDURE — 81000 URINALYSIS NONAUTO W/SCOPE: CPT

## 2019-01-02 PROCEDURE — 85025 COMPLETE CBC W/AUTO DIFF WBC: CPT

## 2019-01-02 PROCEDURE — 84153 ASSAY OF PSA TOTAL: CPT

## 2019-01-02 RX ORDER — DEXTROSE MONOHYDRATE AND SODIUM CHLORIDE 5; .9 G/100ML; G/100ML
INJECTION, SOLUTION INTRAVENOUS CONTINUOUS
Status: DISCONTINUED | OUTPATIENT
Start: 2019-01-02 | End: 2019-01-11 | Stop reason: HOSPADM

## 2019-01-02 RX ORDER — MORPHINE SULFATE 10 MG/ML
2 INJECTION INTRAMUSCULAR; INTRAVENOUS; SUBCUTANEOUS EVERY 4 HOURS PRN
Status: DISCONTINUED | OUTPATIENT
Start: 2019-01-02 | End: 2019-01-09

## 2019-01-02 RX ORDER — ATORVASTATIN CALCIUM 40 MG/1
40 TABLET, FILM COATED ORAL DAILY
Status: DISCONTINUED | OUTPATIENT
Start: 2019-01-03 | End: 2019-01-11 | Stop reason: HOSPADM

## 2019-01-02 RX ORDER — POLYETHYLENE GLYCOL 3350 17 G/17G
17 POWDER, FOR SOLUTION ORAL DAILY
Status: DISCONTINUED | OUTPATIENT
Start: 2019-01-03 | End: 2019-01-11 | Stop reason: HOSPADM

## 2019-01-02 RX ORDER — PHENAZOPYRIDINE HYDROCHLORIDE 100 MG/1
100 TABLET, FILM COATED ORAL 3 TIMES DAILY PRN
COMMUNITY

## 2019-01-02 RX ORDER — ENOXAPARIN SODIUM 100 MG/ML
40 INJECTION SUBCUTANEOUS EVERY 24 HOURS
Status: COMPLETED | OUTPATIENT
Start: 2019-01-02 | End: 2019-01-02

## 2019-01-02 RX ORDER — SODIUM CHLORIDE 0.9 % (FLUSH) 0.9 %
5 SYRINGE (ML) INJECTION
Status: DISCONTINUED | OUTPATIENT
Start: 2019-01-02 | End: 2019-01-11 | Stop reason: HOSPADM

## 2019-01-02 RX ORDER — MORPHINE SULFATE 10 MG/ML
4 INJECTION INTRAMUSCULAR; INTRAVENOUS; SUBCUTANEOUS EVERY 4 HOURS PRN
Status: DISCONTINUED | OUTPATIENT
Start: 2019-01-02 | End: 2019-01-09

## 2019-01-02 RX ADMIN — ENOXAPARIN SODIUM 40 MG: 100 INJECTION SUBCUTANEOUS at 04:01

## 2019-01-02 RX ADMIN — CEFTRIAXONE 1 G: 1 INJECTION, SOLUTION INTRAVENOUS at 04:01

## 2019-01-02 RX ADMIN — SODIUM CHLORIDE 1000 ML: 0.9 INJECTION, SOLUTION INTRAVENOUS at 12:01

## 2019-01-02 RX ADMIN — DEXTROSE AND SODIUM CHLORIDE: 5; .9 INJECTION, SOLUTION INTRAVENOUS at 04:01

## 2019-01-02 RX ADMIN — MORPHINE SULFATE 4 MG: 10 INJECTION INTRAVENOUS at 04:01

## 2019-01-02 NOTE — ED NOTES
Patient's family notified RN's that patient pulled out Smith Cath. Upon arrival, RN's noticed urine seeping around Smith Cath out of penis. RAY Wooten immediately notified Dr dougherty. Dr. Sutherland instructed to deflate balloon, advance Cath, and re inflate balloon. RAY Robertson is at bedside deflating balloon, advancing cath and re inflating balloon per farhat s instructions.

## 2019-01-02 NOTE — HPI
Mr. Lockhart is a 72 yo M with a history of stroke and dementia, who presents from home per family with a R hip fracture on 1/2/19. The patient is not able to give any history due to dementia. The history was obtained per my discussion with the ED staff as well as review of the ED record.  This patient was just discharged from this hospital on 12/18/18 for suspected urinary retention and possible prostatitis.  No further history/details available about patient's presenting complaint. There was an obvious deformity of the R hip on presentation to the ED. The patient was also found to have a unstageable sacral decub on admission.

## 2019-01-02 NOTE — SUBJECTIVE & OBJECTIVE
Past Medical History:   Diagnosis Date    Dementia     Stroke        History reviewed. No pertinent surgical history.    Review of patient's allergies indicates:  No Known Allergies    No current facility-administered medications on file prior to encounter.      Current Outpatient Medications on File Prior to Encounter   Medication Sig    atorvastatin (LIPITOR) 40 MG tablet Take 40 mg by mouth once daily.    phenazopyridine (PYRIDIUM) 100 MG tablet Take 100 mg by mouth 3 (three) times daily as needed for Pain.    tamsulosin (FLOMAX) 0.4 mg Cap Take 1 capsule (0.4 mg total) by mouth once daily.    [] ciprofloxacin HCl (CIPRO) 500 MG tablet Take 1 tablet (500 mg total) by mouth every 12 (twelve) hours. for 14 days     Family History     None        Tobacco Use    Smoking status: Never Smoker    Smokeless tobacco: Never Used   Substance and Sexual Activity    Alcohol use: No     Frequency: Never    Drug use: No    Sexual activity: No     Partners: Female     Review of Systems   Unable to perform ROS: Dementia     Objective:     Vital Signs (Most Recent):  Temp: 97.9 °F (36.6 °C) (19 1009)  Pulse: 107 (19 1059)  Resp: (!) 24 (19 1059)  BP: (!) 162/67 (19 1032)  SpO2: 99 % (19 1059) Vital Signs (24h Range):  Temp:  [97.9 °F (36.6 °C)] 97.9 °F (36.6 °C)  Pulse:  [] 107  Resp:  [18-24] 24  SpO2:  [95 %-100 %] 99 %  BP: (134-162)/(60-67) 162/67     Weight: 49.9 kg (110 lb)  Body mass index is 17.75 kg/m².    Physical Exam   Constitutional: He is oriented to person, place, and time. He appears well-developed and well-nourished.   HENT:   Head: Normocephalic and atraumatic.   Cardiovascular: Normal rate and regular rhythm. Exam reveals no gallop and no friction rub.   Pulmonary/Chest: Effort normal and breath sounds normal. No respiratory distress. He has no wheezes. He has no rales. He exhibits no tenderness.   Abdominal: Soft. He exhibits no mass. There is no tenderness.  There is no rebound and no guarding.   Neurological: He is alert and oriented to person, place, and time.   Psychiatric: He has a normal mood and affect. His behavior is normal.   Nursing note and vitals reviewed.          Significant Labs:   BMP:   Recent Labs   Lab 01/02/19  1057   GLU 96      K 3.9   CL 99   CO2 31*   BUN 20   CREATININE 0.6   CALCIUM 8.6*   MG 1.7     CBC:   Recent Labs   Lab 01/02/19  1057   WBC 15.41*   HGB 11.7*   HCT 35.0*   *       Significant Imaging:   R hip fracture noted on CT.     R sided lytic rib lesions #9 and 10.

## 2019-01-02 NOTE — ASSESSMENT & PLAN NOTE
Not sure of acuity as unable to get history.  Ortho consulted.  Patient is very frail and thin. Will leave up to ortho if appropriate for surgery.  I would expect a possible difficult recovery.  At least for now, no cardio/pulmonary active issues.

## 2019-01-02 NOTE — ASSESSMENT & PLAN NOTE
Recent evaluation 12/18. Sent home on flomax.  Possible prostate CA and the patient was to follow up with urology. Noted lytic bone lesions on x-ray.  Will check a PSA.  Consider further consultation

## 2019-01-02 NOTE — HOSPITAL COURSE
Mr. Lockhart is a 74 yo M with a history of stroke and dementia, who presents from home per family with a R hip fracture on 1/2/19.  Ortho was consulted. The patient is from home with family. He was also found to have a unstageable sacral decub. The patient's step daughter requested palliative care consult on 1/3/19.  The patient went for IM nailing of R femur on 1/3/19. Oncology was consulted secondary to elevated PSA and lytic rib lesions found on X-ray. They recommended urology consult.  Urology is considering prostate Bx. Oncology ordered bone scan highly suggestive of metastatic disease to bilateral ribs and could not r/o pathologic R hip fracture.  PT/OT noted little progress as sessions limited by pain and dementia.  Palliative care met with the family on 1/7/19 and the patient is now DNR. Further, [facility] hospice met with the family on 1/10. Patient had a prostate Bx by urology on 1/9/19. The patient will be discharged to [facility] inpatient hospice today. Activity- bed bound. Diet- regular as tolerated. Follow up per hospice.

## 2019-01-02 NOTE — PROGRESS NOTES
Pt arrived to [facility] from ED, tele monitor [id number] called in to tech, orders reviewed and resumed, VS see doc flow sheet

## 2019-01-02 NOTE — ED PROVIDER NOTES
Encounter Date: 1/2/2019    SCRIBE #1 NOTE: I, Laila Weir, am scribing for, and in the presence of,  Wilman Whitlock MD. I have scribed the following portions of the note - Other sections scribed: ROS and HPI.       History     Chief Complaint   Patient presents with    Hip Pain     Right hip pain. Per EMS pt has significant derormity and family denies fall or trauma.     Wound Check     Per EMS family wants sacral pressure injury checked .      CC: Hip Pain; Wound Check    HPI: This 73 y.o. male with  a past medical history of Dementia and Stroke, presents to the ED via EMS from his home c/o an acute onset, severe and constant R hip pain with obvious deformity today. Per EMS, family denies any recent fall or trauma. They also want his sacral pressure injury to get checked. The patient was reportedly seen for his UTI x1 week ago, unsure about compliance with his prescribed Cipro. Otherwise, hx is limited due to the patient's dementia. The patients family is not present at the bedside at this time.      The history is provided by the EMS personnel. No  was used.     Review of patient's allergies indicates:  No Known Allergies  Past Medical History:   Diagnosis Date    Dementia     Stroke      History reviewed. No pertinent surgical history.  History reviewed. No pertinent family history.  Social History     Tobacco Use    Smoking status: Never Smoker    Smokeless tobacco: Never Used   Substance Use Topics    Alcohol use: No     Frequency: Never    Drug use: No     Review of Systems   Unable to perform ROS: Dementia   Musculoskeletal:        (+) R hip pain w/obvious deformity.   Skin: Positive for color change (sacral region), rash (abrasion to R hip) and wound (sacral region).       Physical Exam     Initial Vitals [01/02/19 1009]   BP Pulse Resp Temp SpO2   134/60 87 18 97.9 °F (36.6 °C) 95 %      MAP       --         Physical Exam  The patient was examined specifically for the following:    General:No significant distress, Good color, Warm and dry. Head and neck:Scalp atraumatic, Neck supple. Neurological:Appropriate conversation, Gross motor deficits. Eyes:Conjugate gaze, Clear corneas. ENT: No epistaxis. Cardiac: Regular rate and rhythm, Grossly normal heart tones. Pulmonary: Wheezing, Rales. Gastrointestinal: Abdominal tenderness, Abdominal distention. Musculoskeletal: Extremity deformity, Apparent pain with range of motion of the joints. Skin: Rash.   The findings on examination were normal except for the following:  The patient seems to be demented.  The lungs are clear.  The heart tones are normal.  The abdomen is soft.  The patient is very thin.  The patient has a very swollen right hip.  There is pain with range of motion of the right hip.  The patient has normal male genitalia.  He has a superficial 6 x 3 cm pressure wound on the right hip.  He has a 6 cm circular pressure wound on the sacrum with central black eschar that is 4.5 cm in diameter. The lungs are clear.  The heart tones are normal. The abdomen is soft.  ED Course   Procedures  Labs Reviewed   COMPREHENSIVE METABOLIC PANEL - Abnormal; Notable for the following components:       Result Value    CO2 31 (*)     Calcium 8.6 (*)     Total Protein 5.9 (*)     Albumin 2.7 (*)     Total Bilirubin 1.2 (*)     Alkaline Phosphatase 217 (*)     AST 79 (*)     ALT 61 (*)     All other components within normal limits   CBC W/ AUTO DIFFERENTIAL - Abnormal; Notable for the following components:    WBC 15.41 (*)     RBC 3.96 (*)     Hemoglobin 11.7 (*)     Hematocrit 35.0 (*)     Platelets 407 (*)     Gran # (ANC) 13.3 (*)     Lymph # 0.9 (*)     Mono # 1.1 (*)     Gran% 87.5 (*)     Lymph% 5.8 (*)     All other components within normal limits   URINALYSIS - Abnormal; Notable for the following components:    Ketones, UA 1+ (*)     Nitrite, UA Positive (*)     Urobilinogen, UA 4.0-6.0 (*)     All other components within normal limits   URINALYSIS  MICROSCOPIC - Abnormal; Notable for the following components:    Bacteria, UA Moderate (*)     All other components within normal limits   CULTURE, URINE   CULTURE, BLOOD   CULTURE, BLOOD   MAGNESIUM   LACTIC ACID, PLASMA   APTT   PROTIME-INR     EKG Readings: (Independently Interpreted)   This patient is in a normal sinus rhythm with a heart rate of 87.  The p.r. QRS and QT intervals are normal.  There is no definite evidence of acute myocardial infarction or malignant arrhythmia.  This is a poor baseline EKG       Imaging Results          CT Hip Without Contrast Right (Final result)  Result time 01/02/19 13:35:56    Final result by Fany Gold MD (01/02/19 13:35:56)                 Impression:      There is a comminuted intertrochanteric fracture of the right hip.      Electronically signed by: Fany Gold MD  Date:    01/02/2019  Time:    13:35             Narrative:    EXAMINATION:  CT HIP WITHOUT CONTRAST RIGHT    CLINICAL HISTORY:  Fracture, hip;    COMPARISON:  None    FINDINGS:  There is a comminuted intertrochanteric fracture of the right hip.  The right femoral head is located.  No other fractures are visualized on this CT.  There is a Smith catheter.                               X-Ray Chest AP Portable (Final result)     Abnormal  Result time 01/02/19 13:21:40    Final result by Jesse Yee MD (01/02/19 13:21:40)                 Impression:      No acute chest disease identified.    Expansile lytic bone lesions involving the posterior aspects of the right 9th and 10th ribs.  The appearance is nonspecific, however metastatic disease cannot be excluded.    Possible pleural thickening within the right upper thorax laterally.    This report was flagged in Epic as abnormal.      Electronically signed by: Jesse Yee MD  Date:    01/02/2019  Time:    13:21             Narrative:    EXAMINATION:  XR CHEST AP PORTABLE    CLINICAL HISTORY:  chest pain;    TECHNIQUE:  Single frontal view of the  chest was performed.    COMPARISON:  12/17/2018.    FINDINGS:  The heart is not enlarged.  Atherosclerotic calcification is present within the aortic arch.  Superior mediastinal structures are unremarkable.  Pulmonary vasculature is within normal limits.  The lungs are free of focal consolidations.  There is no evidence for pneumothorax or large pleural effusions.  There are expansile lucent bone lesions involving the posterior aspects of the right 9th and 10th ribs.  Given the patient's possible pathologic fracture of the proximal right femur and the abnormal appearance of the prostate gland on recent imaging, bony metastases cannot be completely excluded.  Correlation with prior imaging studies would be helpful.  There may also be some pleural thickening within the right upper thorax laterally.  The shoulders are noted to be high-riding bilaterally likely related to underlying rotator cuff injuries.                               X-Ray Hip 2 View Right (Final result)     Abnormal  Result time 01/02/19 11:45:47    Final result by Jesse Yee MD (01/02/19 11:45:47)                 Impression:      Acute angulated intertrochanteric fracture of the proximal right femur.  The fracture extends through a vague area of increased density within the proximal right femur and a pathologic fracture should be considered.    This report was flagged in Epic as abnormal.      Electronically signed by: Jesse Yee MD  Date:    01/02/2019  Time:    11:45             Narrative:    EXAMINATION:  XR HIP 2 VIEW RIGHT    CLINICAL HISTORY:  Effusion, right hip    TECHNIQUE:  AP view of the pelvis and frog leg lateral view of the right hip were performed.    COMPARISON:  None    FINDINGS:  There is an acute and angulated intertrochanteric fracture of the proximal right femur.  The lesser trochanter is present as a separate fracture fragment.  There is no evidence for dislocation.  There is possible subtle increased density within the  proximal right femur in the intertrochanteric region and this could represent a pathologic fracture.                              Medical decision making:  Given the above this patient complains of right hip pain. There is obvious deformity of the right hip.  X-rays reveal a fracture that is intertrochanteric.  Lytic lesions are also noted in the ribs.  Pathologic fracture is considered.  Patient's urine is nitrite positive I believe he has a urinary tract infection.  He was being treated with Cipro and per radium prior to arriving to the emergency room.  Urinary retention was considered.  We did not really find this when we placed is Smith catheter.  Patient has a normal lactate.  We doubt sepsis.  It does have a leukocytosis but no fever.  Bacteremia is considered blood cultures were done.  Urine culture was done.  The patient will be admitted to Hospital Medicine with Orthopedics consulting.                Scribe Attestation:   Scribe #1: I performed the above scribed service and the documentation accurately describes the services I performed. I attest to the accuracy of the note.    Attending Attestation:           Physician Attestation for Scribe:  Physician Attestation Statement for Scribe #1: I, Wilman Whitlock MD, reviewed documentation, as scribed by Laila Weir in my presence, and it is both accurate and complete.                    Clinical Impression:   The primary encounter diagnosis was Closed displaced intertrochanteric fracture of right femur, initial encounter. Diagnoses of Hip swelling, right, Leukocytosis, unspecified type, Acute cystitis without hematuria, Lytic bone lesions on xray, and Dementia without behavioral disturbance, unspecified dementia type were also pertinent to this visit.                             Wilman Whitlock MD  01/02/19 [id number]

## 2019-01-02 NOTE — H&P
[facility] Hospital Medicine  History & Physical    Patient Name: Hugo Lockhart  MRN: [id number]  Admission Date: 2019  Attending Physician: Wilman Whitlock MD   Primary Care Provider: [facility]         Patient information was obtained from ER records.     Subjective:     Principal Problem:Closed displaced intertrochanteric fracture of right femur    Chief Complaint:   Chief Complaint   Patient presents with    Hip Pain     Right hip pain. Per EMS pt has significant derormity and family denies fall or trauma.     Wound Check     Per EMS family wants sacral pressure injury checked .         HPI: Mr. Lockhart is a 72 yo M with a history of stroke and dementia, who presents from home per family with a R hip fracture on 19. The patient is not able to give any history due to dementia. The history was obtained per my discussion with the ED staff as well as review of the ED record.  This patient was just discharged from this hospital on 18 for suspected urinary retention and possible prostatitis.  No further history/details available about patient's presenting complaint. There was an obvious deformity of the R hip on presentation to the ED. The patient was also found to have a unstageable sacral decub on admission.      Past Medical History:   Diagnosis Date    Dementia     Stroke        History reviewed. No pertinent surgical history.    Review of patient's allergies indicates:  No Known Allergies    No current facility-administered medications on file prior to encounter.      Current Outpatient Medications on File Prior to Encounter   Medication Sig    atorvastatin (LIPITOR) 40 MG tablet Take 40 mg by mouth once daily.    phenazopyridine (PYRIDIUM) 100 MG tablet Take 100 mg by mouth 3 (three) times daily as needed for Pain.    tamsulosin (FLOMAX) 0.4 mg Cap Take 1 capsule (0.4 mg total) by mouth once daily.    [] ciprofloxacin HCl (CIPRO) 500 MG tablet Take 1  tablet (500 mg total) by mouth every 12 (twelve) hours. for 14 days     Family History     None        Tobacco Use    Smoking status: Never Smoker    Smokeless tobacco: Never Used   Substance and Sexual Activity    Alcohol use: No     Frequency: Never    Drug use: No    Sexual activity: No     Partners: Female     Review of Systems   Unable to perform ROS: Dementia     Objective:     Vital Signs (Most Recent):  Temp: 97.9 °F (36.6 °C) (01/02/19 1009)  Pulse: 107 (01/02/19 1059)  Resp: (!) 24 (01/02/19 1059)  BP: (!) 162/67 (01/02/19 1032)  SpO2: 99 % (01/02/19 1059) Vital Signs (24h Range):  Temp:  [97.9 °F (36.6 °C)] 97.9 °F (36.6 °C)  Pulse:  [] 107  Resp:  [18-24] 24  SpO2:  [95 %-100 %] 99 %  BP: (134-162)/(60-67) 162/67     Weight: 49.9 kg (110 lb)  Body mass index is 17.75 kg/m².    Physical Exam   Constitutional: He is oriented to person, place, and time. He appears well-developed and well-nourished.   HENT:   Head: Normocephalic and atraumatic.   Cardiovascular: Normal rate and regular rhythm. Exam reveals no gallop and no friction rub.   Pulmonary/Chest: Effort normal and breath sounds normal. No respiratory distress. He has no wheezes. He has no rales. He exhibits no tenderness.   Abdominal: Soft. He exhibits no mass. There is no tenderness. There is no rebound and no guarding.   Neurological: He is alert and oriented to person, place, and time.   Psychiatric: He has a normal mood and affect. His behavior is normal.   Nursing note and vitals reviewed.          Significant Labs:   BMP:   Recent Labs   Lab 01/02/19  1057   GLU 96      K 3.9   CL 99   CO2 31*   BUN 20   CREATININE 0.6   CALCIUM 8.6*   MG 1.7     CBC:   Recent Labs   Lab 01/02/19  1057   WBC 15.41*   HGB 11.7*   HCT 35.0*   *       Significant Imaging:   R hip fracture noted on CT.     R sided lytic rib lesions #9 and 10.        Assessment/Plan:     * Closed displaced intertrochanteric fracture of right femur    Not  sure of acuity as unable to get history.  Ortho consulted.  Patient is very frail and thin. Will leave up to ortho if appropriate for surgery.  I would expect a possible difficult recovery.  At least for now, no cardio/pulmonary active issues.        Anemia of chronic disease    No acute issues         BPH with urinary obstruction    Recent evaluation 12/18. Sent home on flomax.  Possible prostate CA and the patient was to follow up with urology. Noted lytic bone lesions on x-ray.  Will check a PSA.  Consider further consultation        History of CVA (cerebrovascular accident)    No acute issues        Dementia with behavioral disturbance    No acute issues.          VTE Risk Mitigation (From admission, onward)    None             Mj Robin MD  Department of Hospital Medicine   [facility]

## 2019-01-02 NOTE — ED TRIAGE NOTES
Pt is disoriented to place, time, and situation, unable to verbalize reason for hospitalization today.  EMS reports family reports pt has hx of dementia and no recent fall or trauma, however, has noted deformity to right hip.  Pt has noted swelling and deformity to right hip at time of arrival.  Pt has either pressure injury or abrasions with errythema to right shoulder blade and right hip and unstageable pressure injury to coccygeal area that appears to be full thickness and black in color.  Unable to measure due to pt intolerance to laying on side long enough to measure.  Placed on cardiac monitor, bp cuff, and pulse ox at this time.  VSS, will continue to monitor and assess.

## 2019-01-03 ENCOUNTER — ANESTHESIA (OUTPATIENT)
Dept: SURGERY | Facility: HOSPITAL | Age: 74
DRG: 480 | End: 2019-01-03
Payer: MEDICARE

## 2019-01-03 ENCOUNTER — ANESTHESIA EVENT (OUTPATIENT)
Dept: SURGERY | Facility: HOSPITAL | Age: 74
DRG: 480 | End: 2019-01-03
Payer: MEDICARE

## 2019-01-03 ENCOUNTER — TELEPHONE (OUTPATIENT)
Dept: UROLOGY | Facility: CLINIC | Age: 74
End: 2019-01-03

## 2019-01-03 PROBLEM — R97.20 ELEVATED PSA: Status: ACTIVE | Noted: 2019-01-03

## 2019-01-03 PROBLEM — M89.9 LYTIC LESION OF BONE ON X-RAY: Status: ACTIVE | Noted: 2019-01-03

## 2019-01-03 PROBLEM — L89.150 UNSTAGEABLE PRESSURE ULCER OF SACRAL REGION: Status: ACTIVE | Noted: 2019-01-03

## 2019-01-03 LAB
ABO + RH BLD: NORMAL
ANION GAP SERPL CALC-SCNC: 6 MMOL/L
BASOPHILS # BLD AUTO: 0.01 K/UL
BASOPHILS NFR BLD: 0.1 %
BLD GP AB SCN CELLS X3 SERPL QL: NORMAL
BUN SERPL-MCNC: 16 MG/DL
CALCIUM SERPL-MCNC: 8 MG/DL
CHLORIDE SERPL-SCNC: 106 MMOL/L
CO2 SERPL-SCNC: 29 MMOL/L
COMPLEXED PSA SERPL-MCNC: 10.8 NG/ML
CREAT SERPL-MCNC: 0.6 MG/DL
DIFFERENTIAL METHOD: ABNORMAL
EOSINOPHIL # BLD AUTO: 0 K/UL
EOSINOPHIL NFR BLD: 0.3 %
ERYTHROCYTE [DISTWIDTH] IN BLOOD BY AUTOMATED COUNT: 13.9 %
EST. GFR  (AFRICAN AMERICAN): >60 ML/MIN/1.73 M^2
EST. GFR  (NON AFRICAN AMERICAN): >60 ML/MIN/1.73 M^2
GLUCOSE SERPL-MCNC: 78 MG/DL
HCT VFR BLD AUTO: 25.6 %
HCT VFR BLD AUTO: 29.7 %
HGB BLD-MCNC: 10 G/DL
HGB BLD-MCNC: 8.2 G/DL
LYMPHOCYTES # BLD AUTO: 1.3 K/UL
LYMPHOCYTES NFR BLD: 11 %
MCH RBC QN AUTO: 30.3 PG
MCHC RBC AUTO-ENTMCNC: 33.7 G/DL
MCV RBC AUTO: 90 FL
MONOCYTES # BLD AUTO: 0.9 K/UL
MONOCYTES NFR BLD: 7.8 %
NEUTROPHILS # BLD AUTO: 9.3 K/UL
NEUTROPHILS NFR BLD: 80.8 %
PLATELET # BLD AUTO: 405 K/UL
PMV BLD AUTO: 9.3 FL
POTASSIUM SERPL-SCNC: 3.4 MMOL/L
RBC # BLD AUTO: 3.3 M/UL
SODIUM SERPL-SCNC: 141 MMOL/L
WBC # BLD AUTO: 11.5 K/UL

## 2019-01-03 PROCEDURE — 25000003 PHARM REV CODE 250: Performed by: ORTHOPAEDIC SURGERY

## 2019-01-03 PROCEDURE — 99223 1ST HOSP IP/OBS HIGH 75: CPT | Mod: ,,, | Performed by: UROLOGY

## 2019-01-03 PROCEDURE — 25000003 PHARM REV CODE 250: Performed by: REGISTERED NURSE

## 2019-01-03 PROCEDURE — D9220A PRA ANESTHESIA: Mod: ANES,,, | Performed by: ANESTHESIOLOGY

## 2019-01-03 PROCEDURE — 63600175 PHARM REV CODE 636 W HCPCS: Performed by: REGISTERED NURSE

## 2019-01-03 PROCEDURE — D9220A PRA ANESTHESIA: ICD-10-PCS | Mod: ANES,,, | Performed by: ANESTHESIOLOGY

## 2019-01-03 PROCEDURE — 71000039 HC RECOVERY, EACH ADD'L HOUR: Performed by: ORTHOPAEDIC SURGERY

## 2019-01-03 PROCEDURE — 85025 COMPLETE CBC W/AUTO DIFF WBC: CPT

## 2019-01-03 PROCEDURE — 85018 HEMOGLOBIN: CPT

## 2019-01-03 PROCEDURE — 63600175 PHARM REV CODE 636 W HCPCS: Performed by: EMERGENCY MEDICINE

## 2019-01-03 PROCEDURE — 99223 PR INITIAL HOSPITAL CARE,LEVL III: ICD-10-PCS | Mod: ,,, | Performed by: UROLOGY

## 2019-01-03 PROCEDURE — 36000711: Performed by: ORTHOPAEDIC SURGERY

## 2019-01-03 PROCEDURE — C1769 GUIDE WIRE: HCPCS | Performed by: ORTHOPAEDIC SURGERY

## 2019-01-03 PROCEDURE — 37000008 HC ANESTHESIA 1ST 15 MINUTES: Performed by: ORTHOPAEDIC SURGERY

## 2019-01-03 PROCEDURE — D9220A PRA ANESTHESIA: Mod: CRNA,,, | Performed by: REGISTERED NURSE

## 2019-01-03 PROCEDURE — 85014 HEMATOCRIT: CPT

## 2019-01-03 PROCEDURE — 80048 BASIC METABOLIC PNL TOTAL CA: CPT

## 2019-01-03 PROCEDURE — 99222 PR INITIAL HOSPITAL CARE,LEVL II: ICD-10-PCS | Mod: ,,, | Performed by: INTERNAL MEDICINE

## 2019-01-03 PROCEDURE — 36000710: Performed by: ORTHOPAEDIC SURGERY

## 2019-01-03 PROCEDURE — 86850 RBC ANTIBODY SCREEN: CPT

## 2019-01-03 PROCEDURE — 11000001 HC ACUTE MED/SURG PRIVATE ROOM

## 2019-01-03 PROCEDURE — 27201423 OPTIME MED/SURG SUP & DEVICES STERILE SUPPLY: Performed by: ORTHOPAEDIC SURGERY

## 2019-01-03 PROCEDURE — C1713 ANCHOR/SCREW BN/BN,TIS/BN: HCPCS | Performed by: ORTHOPAEDIC SURGERY

## 2019-01-03 PROCEDURE — 63600175 PHARM REV CODE 636 W HCPCS: Performed by: ANESTHESIOLOGY

## 2019-01-03 PROCEDURE — 37000009 HC ANESTHESIA EA ADD 15 MINS: Performed by: ORTHOPAEDIC SURGERY

## 2019-01-03 PROCEDURE — D9220A PRA ANESTHESIA: ICD-10-PCS | Mod: CRNA,,, | Performed by: REGISTERED NURSE

## 2019-01-03 PROCEDURE — 25000003 PHARM REV CODE 250: Performed by: EMERGENCY MEDICINE

## 2019-01-03 PROCEDURE — 36415 COLL VENOUS BLD VENIPUNCTURE: CPT

## 2019-01-03 PROCEDURE — 71000033 HC RECOVERY, INTIAL HOUR: Performed by: ORTHOPAEDIC SURGERY

## 2019-01-03 PROCEDURE — 99222 1ST HOSP IP/OBS MODERATE 55: CPT | Mod: ,,, | Performed by: INTERNAL MEDICINE

## 2019-01-03 DEVICE — WIRE KIRSCHNER T2 3X285MM SS: Type: IMPLANTABLE DEVICE | Site: HIP | Status: FUNCTIONAL

## 2019-01-03 DEVICE — SCREW LOCKING 5.0 X 40MM: Type: IMPLANTABLE DEVICE | Site: HIP | Status: FUNCTIONAL

## 2019-01-03 DEVICE — IMPLANTABLE DEVICE: Type: IMPLANTABLE DEVICE | Site: HIP | Status: FUNCTIONAL

## 2019-01-03 DEVICE — SCREW LOCKING 5 X 42.5: Type: IMPLANTABLE DEVICE | Site: HIP | Status: FUNCTIONAL

## 2019-01-03 RX ORDER — ENOXAPARIN SODIUM 100 MG/ML
40 INJECTION SUBCUTANEOUS EVERY 24 HOURS
Status: DISCONTINUED | OUTPATIENT
Start: 2019-01-04 | End: 2019-01-08

## 2019-01-03 RX ORDER — FENTANYL CITRATE 50 UG/ML
INJECTION, SOLUTION INTRAMUSCULAR; INTRAVENOUS
Status: DISCONTINUED | OUTPATIENT
Start: 2019-01-03 | End: 2019-01-03

## 2019-01-03 RX ORDER — LIDOCAINE HCL/PF 100 MG/5ML
SYRINGE (ML) INTRAVENOUS
Status: DISCONTINUED | OUTPATIENT
Start: 2019-01-03 | End: 2019-01-03

## 2019-01-03 RX ORDER — DOCUSATE SODIUM 100 MG/1
100 CAPSULE, LIQUID FILLED ORAL 2 TIMES DAILY
Status: DISCONTINUED | OUTPATIENT
Start: 2019-01-03 | End: 2019-01-11 | Stop reason: HOSPADM

## 2019-01-03 RX ORDER — ONDANSETRON 2 MG/ML
4 INJECTION INTRAMUSCULAR; INTRAVENOUS EVERY 12 HOURS PRN
Status: DISCONTINUED | OUTPATIENT
Start: 2019-01-03 | End: 2019-01-11 | Stop reason: HOSPADM

## 2019-01-03 RX ORDER — MUPIROCIN 20 MG/G
1 OINTMENT TOPICAL 2 TIMES DAILY
Status: COMPLETED | OUTPATIENT
Start: 2019-01-03 | End: 2019-01-08

## 2019-01-03 RX ORDER — EPHEDRINE SULFATE 50 MG/ML
INJECTION, SOLUTION INTRAVENOUS
Status: DISCONTINUED | OUTPATIENT
Start: 2019-01-03 | End: 2019-01-03

## 2019-01-03 RX ORDER — PHENYLEPHRINE HYDROCHLORIDE 10 MG/ML
INJECTION INTRAVENOUS
Status: DISCONTINUED | OUTPATIENT
Start: 2019-01-03 | End: 2019-01-03

## 2019-01-03 RX ORDER — HYDROMORPHONE HYDROCHLORIDE 2 MG/ML
0.2 INJECTION, SOLUTION INTRAMUSCULAR; INTRAVENOUS; SUBCUTANEOUS EVERY 5 MIN PRN
Status: DISCONTINUED | OUTPATIENT
Start: 2019-01-03 | End: 2019-01-06

## 2019-01-03 RX ORDER — ROCURONIUM BROMIDE 10 MG/ML
INJECTION, SOLUTION INTRAVENOUS
Status: DISCONTINUED | OUTPATIENT
Start: 2019-01-03 | End: 2019-01-03

## 2019-01-03 RX ORDER — GLYCOPYRROLATE 0.2 MG/ML
INJECTION INTRAMUSCULAR; INTRAVENOUS
Status: DISCONTINUED | OUTPATIENT
Start: 2019-01-03 | End: 2019-01-03

## 2019-01-03 RX ORDER — SODIUM CHLORIDE 9 MG/ML
INJECTION, SOLUTION INTRAVENOUS CONTINUOUS PRN
Status: DISCONTINUED | OUTPATIENT
Start: 2019-01-03 | End: 2019-01-03

## 2019-01-03 RX ORDER — SODIUM CHLORIDE 0.9 % (FLUSH) 0.9 %
3 SYRINGE (ML) INJECTION
Status: DISCONTINUED | OUTPATIENT
Start: 2019-01-03 | End: 2019-01-11 | Stop reason: HOSPADM

## 2019-01-03 RX ORDER — PROPOFOL 10 MG/ML
VIAL (ML) INTRAVENOUS
Status: DISCONTINUED | OUTPATIENT
Start: 2019-01-03 | End: 2019-01-03

## 2019-01-03 RX ORDER — BISACODYL 10 MG
10 SUPPOSITORY, RECTAL RECTAL DAILY PRN
Status: DISCONTINUED | OUTPATIENT
Start: 2019-01-03 | End: 2019-01-11 | Stop reason: HOSPADM

## 2019-01-03 RX ORDER — NEOSTIGMINE METHYLSULFATE 1 MG/ML
INJECTION, SOLUTION INTRAVENOUS
Status: DISCONTINUED | OUTPATIENT
Start: 2019-01-03 | End: 2019-01-03

## 2019-01-03 RX ADMIN — PHENYLEPHRINE HYDROCHLORIDE 100 MCG: 10 INJECTION INTRAVENOUS at 02:01

## 2019-01-03 RX ADMIN — CEFTRIAXONE 1 G: 1 INJECTION, SOLUTION INTRAVENOUS at 04:01

## 2019-01-03 RX ADMIN — MORPHINE SULFATE 2 MG: 10 INJECTION INTRAVENOUS at 09:01

## 2019-01-03 RX ADMIN — GLYCOPYRROLATE 0.4 MG: 0.2 INJECTION, SOLUTION INTRAMUSCULAR; INTRAVENOUS at 02:01

## 2019-01-03 RX ADMIN — ROCURONIUM BROMIDE 20 MG: 10 INJECTION, SOLUTION INTRAVENOUS at 02:01

## 2019-01-03 RX ADMIN — HYDROMORPHONE HYDROCHLORIDE 0.2 MG: 2 INJECTION, SOLUTION INTRAMUSCULAR; INTRAVENOUS; SUBCUTANEOUS at 03:01

## 2019-01-03 RX ADMIN — FENTANYL CITRATE 25 MCG: 50 INJECTION INTRAMUSCULAR; INTRAVENOUS at 02:01

## 2019-01-03 RX ADMIN — EPHEDRINE SULFATE 10 MG: 50 INJECTION, SOLUTION INTRAMUSCULAR; INTRAVENOUS; SUBCUTANEOUS at 02:01

## 2019-01-03 RX ADMIN — DEXTROSE AND SODIUM CHLORIDE: 5; .9 INJECTION, SOLUTION INTRAVENOUS at 09:01

## 2019-01-03 RX ADMIN — EPHEDRINE SULFATE 5 MG: 50 INJECTION, SOLUTION INTRAMUSCULAR; INTRAVENOUS; SUBCUTANEOUS at 02:01

## 2019-01-03 RX ADMIN — MORPHINE SULFATE 4 MG: 10 INJECTION INTRAVENOUS at 06:01

## 2019-01-03 RX ADMIN — NEOSTIGMINE METHYLSULFATE 3 MG: 1 INJECTION INTRAVENOUS at 02:01

## 2019-01-03 RX ADMIN — MUPIROCIN 1 G: 20 OINTMENT TOPICAL at 09:01

## 2019-01-03 RX ADMIN — SODIUM CHLORIDE: 0.9 INJECTION, SOLUTION INTRAVENOUS at 01:01

## 2019-01-03 RX ADMIN — PROPOFOL 100 MG: 10 INJECTION, EMULSION INTRAVENOUS at 02:01

## 2019-01-03 RX ADMIN — LIDOCAINE HYDROCHLORIDE 100 MG: 20 INJECTION, SOLUTION INTRAVENOUS at 02:01

## 2019-01-03 RX ADMIN — DOCUSATE SODIUM 100 MG: 100 CAPSULE, LIQUID FILLED ORAL at 09:01

## 2019-01-03 NOTE — BRIEF OP NOTE
[facility]  Brief Operative Note    SUMMARY     Surgery Date: 1/3/2019     Surgeon(s) and Role:     * Tavares Gao MD - Primary    Assisting Surgeon: None    Pre-op Diagnosis:  Closed intertrochanteric fracture of femur [S72.143A]    Post-op Diagnosis:  Post-Op Diagnosis Codes:     * Closed intertrochanteric fracture of femur [S72.143A]    Procedure(s) (LRB):  INSERTION, INTRAMEDULLARY ZAKIYA, FEMUR (Right)    Anesthesia: Choice    Description of Procedure: IM gamma nail  Description of the findings of the procedure: fx    Estimated Blood Loss: 100ml         Specimens:   Specimen (12h ago, onward)    None      Noé 10 x 340 nail, 75 mm lag, set, 40 and 42.5 locking screws

## 2019-01-03 NOTE — PLAN OF CARE
Problem: Adult Inpatient Plan of Care  Goal: Plan of Care Review  Outcome: Ongoing (interventions implemented as appropriate)  Pt w/R hip fx Ortho consulted, IVF cont and IV abx per order, x1 Morphine 4mg for pain, turn Q2 w/wedge to offload R hip, sundance boots bilat, bed alarm audible, safety maintained, bed low locked and in position, will cont plan of care

## 2019-01-03 NOTE — ASSESSMENT & PLAN NOTE
Not sure of acuity as unable to get history.  Ortho consulted.  Patient is very frail and thin. Will leave up to ortho if appropriate for surgery.  I would expect a possible difficult recovery.  At least for now, no cardio/pulmonary active issues. As of now, patient is supposed to have a nailing done but daughter has many questions for ortho.

## 2019-01-03 NOTE — CONSULTS
A 73 year old male admitted 1/2/18 from home with closed displaced intertrochanteric fracture of right femur; anemia of chronic disease; BPH with urinary obstruction; history CVA; dementia with behavioral disturbance  PMH: Dementia; stroke; acute prostatitis  1/3 WBC 11.5 Hgb 10.0 Hct 29.7    1/2 Alb 2.7  On Isoflex mattress; positioned with foam wedge; wearing Sundance boots; dependent bed mobility; espinoza catheter  Admitted with multiple pressure injuries  Assessment:  Photodocumentation    Sacrum- Unstageable pressure injury 6 cm(L) 7 cm(W) with eschar roof. Scant serous drainage.     Right hip- Unstageable pressure injury 7 cm(L) 3 cm(W) with yellow slough in base. Scant serous drainage.    Right scapula- Stage 2 pressure injury involving area 2.5 cm(L) 4 cm(W). No drainage.   Left scapula and bilateral heels- No pressure injuries identified.  Treatment:  Local wound care with Convatec Aquacel foam dressings and Pressure Injury Prevention Interventions. Treatment plan discussed with nursing and daughter. Discussed with daughter that treatment plan can be modified as appearance of wounds evolve.

## 2019-01-03 NOTE — TELEPHONE ENCOUNTER
----- Message from Mary Quintana sent at 1/3/2019  4:06 PM CST -----  Contact: Silvia/ [phone]  MRN: [id number]  #: 429 Bed B  Doc:  Dr Chowdary  Reason: Elevated PSA/Lytic Bone Lesion/ Patient Known to you  Stat or Routine: Routine  Name:  Silvia  [phone]

## 2019-01-03 NOTE — HPI
72yo M with elevated PSA, lytic bone lesions. Dr Vasques saw pt as inpatient consultation 12/18/18 for similar as well as possible UR. Recommended possible prostate biopsy as outpatient. Pt scheduled to see me (Dr. Mccarthy) later this month. He is now admitted for R femur fracture.     PSA 12/18/18 - 13.4, 1/2/19 - 10.8. Concern for prostatitis in 12/18.      History is otherwise limited 2/2 dementia. History provided by chart and pt's daughter.

## 2019-01-03 NOTE — ASSESSMENT & PLAN NOTE
Recent evaluation 12/18. Sent home on flomax.  Possible prostate CA and the patient was to follow up with urology. Noted lytic bone lesions on x-ray.  Will check a PSA.  Consider further consultation.  PSA 10.8. Consulted oncology. Palliative care also consulted.

## 2019-01-03 NOTE — CONSULTS
Consult Note  Palliative Care      Consult Requested By: Mj Esteves MD  Reason for Consult:     Goals of care    SUBJECTIVE:     Principal Problem:Closed displaced intertrochanteric fracture of right femur     HPI:  Mr. Lockhart is a 74 yo M with a history of stroke and dementia, who presents from home per family with a R hip fracture on 1/2/19. The patient is not able to give any history due to dementia. The history was obtained per my discussion with the ED staff as well as review of the ED record.  This patient was just discharged from this hospital on 12/18/18 for suspected urinary retention and possible prostatitis.  No further history/details available about patient's presenting complaint. There was an obvious deformity of the R hip on presentation to the ED. The patient was also found to have a unstageable sacral decub on admission.       Hospital Course:  Mr. Lockhart is a 74 yo M with a history of stroke and dementia, who presents from home per family with a R hip fracture on 1/2/19.  Ortho was consulted. The patient is from home with family. He was also found to have a unstageable sacral decub. The patient's step daughter requested palliative care consult on 1/3/19.  The patient went for IM nailing of R femur on 1/3/19. Oncology was consulted secondary to elevated PSA and lytic rib lesions found on X-ray. They recommended urology consult.  Urology is considering prostate Bx. Oncology ordered bone scan highly suggestive of metastatic disease to bilateral ribs and could not r/o pathologic R hip fracture.       Interval History: No new issues           Past Medical History:   Diagnosis Date    Closed displaced intertrochanteric fracture of right femur 01/02/2019    Dementia     Pressure ulcer     01/02/2019, sacral spine with eschar    Requires assistance with all daily activities     Stroke     Unsteady gait      History reviewed. No pertinent surgical history.  History reviewed. No pertinent  "family history.  Social History     Tobacco Use    Smoking status: Never Smoker    Smokeless tobacco: Never Used   Substance Use Topics    Alcohol use: No     Frequency: Never    Drug use: No       Mental Status:   Calm, confused, history of dementia.  Can follow commands.  Occasionally can answer simple question.      Palliative Performance Score:  30        OBJECTIVE:     Pain Assessment/symptom management:  He admits to "a little bit" of pain and points to his left hip (the surgery was on the right hip).  He does not have any nonverbal indicators of pain.  He cannot describe or otherwise discuss  His pain due to advanced dementia.  See MAR for medication regimen.      Decision-Making Capacity:    Patient is unable to speak on his own behalf.      Advanced Directives:  Living Will:   NO      Do Not Resuscitate Status: FULL CODE     Medical Power of : None on file.       Living Arrangements:  Lives with son Regino.      Psychosocial, Spiritual, Cultural:  Patient unable to answer complex questions.    Patient's most important priorities:  Patient's biggest concerns/fears:  Previous death/end of life care history:  Patient's goals/hopes:      ASSESSMENT/PLAN:     Patient lying in bed, watching tv.  He appears much older than stated age, is cachectic.  He is able to state his name and , he can answer a few very simple questions, but cannot engage in any complex dialogue.  He is in no acute distress.  He has no nonverbal indicators of pain.  He can follow very simple commands - hold my hand, move your foot.      Respiratory effort is even and unlabored.  Heart tones audible and regular.  Abdomen somewhat taut, + bowel sounds.  He is severe emaciated.  Right later hip and dthigh area are edematous to the knee 3-4+ .      His step-daughter Levi Cabrales is at the bedside.  She states patient lives with his biological son Alessio and he is the one who makes decisions, but she cares for him often and is " ""the one who can soothe him."  She has a good basic understanding of his condition and poor overall prognosis.  She states he has steadily gone down in decline since her mother (his wife)  about 5 years ago.       We will set up a time either tomorrow or Wednesday when Kartik can be present to discuss goals of care and options for when he is discharged.  She states her mother was on hospice care and they are familiar with that concept.    Provided literature "Hard Choices For [place] People" and fact sheet on cpr for her and for there brother.      Plan:  Educate.  Literature.  Goals of care and code status discussion.  Support.  Consult .     Recommendations:   May need pain medication frequency adjusted (? Q3h)   Family meeting - most likely Wednesday.   Palliative Care will continue to follow.    Thank you for this consult and the opportunity to participate in Mr. Lockhart's care.    Luzmaria Brewer, CHANELLN, RN, CCRN, CHPN   Palliative Care Nurse Coordinator   [facility]  [phone]        Time Spent:   30 minutes bedside assessment, meeting with step-daughter, 30 minutes record review and charting, team discussion.       "

## 2019-01-03 NOTE — CONSULTS
[facility]  Hematology/Oncology  Consult Note    Patient Name: Hugo Lockhart  MRN: [id number]  Admission Date: 1/2/2019  Hospital Length of Stay: 1 days  Code Status: Full Code   Attending Provider: Mj Esteves MD  Consulting Provider: Rosalinda Chowdary MD  Primary Care Physician: [facility]  Principal Problem:Closed displaced intertrochanteric fracture of right femur    Inpatient consult to Hematology/Oncology - [facility]  Consult performed by: Rosalinda Chowdary MD  Consult ordered by: Mj Esteves MD        Subjective:   REASON FOR CONSULTATION: Elevated PSA and lytic lesion  HPI:  Pt  is a 74 yo M with a history of stroke and dementia, presents with inability to walk x 1 week. Workup revealed   R hip fracture  1/2/19.  Ortho  following and surgical intervention plannd today. .Pt lives with his family. . He was also diagnosed with a large  sacral decubitus. Dtr currently at bedside. He is being treated for UTI..Workup also included chest film which shows expansile lytic bone lesions involving the posterior aspects of the right 9th and 10th ribs concerning for metastatic disease.   Pt recently hospitalized with  confusion and lower abdominal pain. Patient found on CT to have large lobular prostate gland suspicious for BPH or prostatic carcinoma. PSA elevated 13.4ng/ml.  Pt treated with  ABX for presumed prostatitis.The patient's step daughter requested palliative care consult  Consulted for elevated PSA and lytic lesion          Oncology Treatment Plan:   [No treatment plan]    Medications:  Continuous Infusions:   dextrose 5 % and 0.9 % NaCl 125 mL/hr at 01/02/19 8329     Scheduled Meds:   atorvastatin  40 mg Oral Daily    cefTRIAXone (ROCEPHIN) IVPB  1 g Intravenous Q24H    docusate sodium  100 mg Oral BID    [START ON 1/4/2019] enoxaparin  40 mg Subcutaneous Daily    mupirocin  1 g Nasal BID    polyethylene glycol  17 g Oral Daily     PRN Meds:bisacodyl,  HYDROmorphone, morphine, morphine, ondansetron, promethazine (PHENERGAN) IVPB, sodium chloride 0.9%, sodium chloride 0.9%     Review of patient's allergies indicates:  No Known Allergies     Past Medical History:   Diagnosis Date    Closed displaced intertrochanteric fracture of right femur 01/02/2019    Dementia     Pressure ulcer     01/02/2019, sacral spine with eschar    Requires assistance with all daily activities     Stroke     Unsteady gait      History reviewed. No pertinent surgical history.  Family History     None        Tobacco Use    Smoking status: Never Smoker    Smokeless tobacco: Never Used   Substance and Sexual Activity    Alcohol use: No     Frequency: Never    Drug use: No    Sexual activity: No     Partners: Female       Review of Systems   Unable to perform ROS: Dementia     Objective:     Vital Signs (Most Recent):  Temp: 97.7 °F (36.5 °C) (01/03/19 1118)  Pulse: 85 (01/03/19 1118)  Resp: 17 (01/03/19 1118)  BP: 115/60 (01/03/19 1118)  SpO2: 98 % (01/03/19 1118) Vital Signs (24h Range):  Temp:  [97.7 °F (36.5 °C)-98.6 °F (37 °C)] 97.7 °F (36.5 °C)  Pulse:  [80-95] 85  Resp:  [16-20] 17  SpO2:  [96 %-99 %] 98 %  BP: (107-127)/(57-67) 115/60     Weight: 49.9 kg (110 lb)  Body mass index is 17.75 kg/m².  Body surface area is 1.52 meters squared.      Intake/Output Summary (Last 24 hours) at 1/3/2019 1510  Last data filed at 1/3/2019 1457  Gross per 24 hour   Intake 900 ml   Output --   Net 900 ml       Physical Exam   HENT:   Head: Normocephalic.   Eyes: Conjunctivae are normal. No scleral icterus.   Neck: Normal range of motion. Neck supple. No thyromegaly present.   Cardiovascular: Normal rate, regular rhythm and normal heart sounds.   No murmur heard.  Pulmonary/Chest: Effort normal and breath sounds normal. He has no wheezes. He has no rales.   Abdominal: Soft. Bowel sounds are normal. There is no hepatosplenomegaly. There is no tenderness. There is no rebound and no guarding.    Musculoskeletal: Normal range of motion. He exhibits no edema.   Neurological: He is alert. No cranial nerve deficit.   Skin: No rash noted. No erythema.   Psychiatric: He has a normal mood and affect.       Significant Labs:   All pertinent labs within the past 24 hours have been reviewed    Diagnostic Results:  I have reviewed and interpreted all pertinent imaging results/findings within the past 24 hours    Assessment/Plan:     Rt femur fracture  Followed by Ortho  Surgical intervention planned       Elevated PSA    PSA  10.8ng/ml  Pt treated for possible prostatitis  Plan Urology consult for evaluation for potential bx       Lytic lesion of bone on x-ray    Chest film findings reveals lytic lesion concerning for bone mets  Plan Bone scan      Unstageable pressure ulcer of sacral region    Followed by wound care         Thank you for your consult.    Rosalinda Chowdary MD  Hematology/Oncology  [facility]

## 2019-01-03 NOTE — NURSING
Patient arrived back on unit via bed from surgery. Dressing to R hip clean, dry, and intact. Vital Signs Stable; afebrile. Patient resting comfortably. No acute distress noted; will continue to monitor.

## 2019-01-03 NOTE — NURSING
Call placed to MD due to patient having questions concerning surgery this am and would like to speak to him prior to surgery. Spoke with Sara, Medical Assistant, who stated would pass message along to Dr. Gao. Informed patient's daughter who is at bedside. Understanding verbalized.

## 2019-01-03 NOTE — NURSING
Received call from Catherine is surgery. Explained that patient's family is requesting to speak with Dr. Gao prior to surgery; Catherine verbalized understanding.     1213:  Call placed to surgery to inform Dr. Gauthier per  patient's daughter they are definitely going to do the surgery. Catherine verbalized understanding and will send for patient. Informed patient and daughter.

## 2019-01-03 NOTE — CARE UPDATE
Conversation documentation:  1/3/2019    Received a call from  coordinator regarding a call from former patient Mr/ Hugo Lockhart - His daughter ask me to call him because he is back in the hospital. The patient was recently cared for on observation and found at that time (about 2 weeks ago (12/18/18) to have an enlarged prostate, possible infection and questionable mass - seen by urology at that time who recommended treating the infection for now and follow up in clinic for biopsy. Patient apparently has had progressive decline since that time - was a [facility] patient and took some time to change the plan under medicaid - recently was scheduled to see [place].     Patient returns with what appears to be displaced hip fracture. Daughter concerned regarding risk/benefit given his other co morbidities and has multiple questions.    I did advise the patient that I am not his present provider; however, she stated that she trust me and had questions. Most of questions should be discussed with surgery and present provider. I assured her she would have the opportunity. I explained what displaced vs non-displaced for a hip fracture would mean for pain and mobility for the patient however, I did not discuss in detail his prostate mass and cannot determine if there is an association which appears to be her question. I have directed her back to her primary physician, Dr. Esteves who has already consulted Urology and Ortho.     There is nothing further from me      PlAN:  -Spoke to nurse Cal who will make sure Dr. Gao knows that the patient wants to speak with him before surgery she will let him know  -Spoke to Dr. Esteves who is aware of her concerns; Palliative care consult ordered to discuss goals of care  -Urology already consulted on this case        JUDY Edgar, FNP-C  Hospitalist - Department of Hospital Medicine  [address], [place] [address]  Office  [phone]; Pager [phone]

## 2019-01-03 NOTE — NURSING
Received called from nuclear medicine [facility], [place]; informed bone scan will be performed tomorrow.

## 2019-01-03 NOTE — TRANSFER OF CARE
Anesthesia Transfer of Care Note    Patient: Hugo Lockhart    Procedure(s) Performed: Procedure(s) (LRB):  INSERTION, INTRAMEDULLARY ZAKIYA, FEMUR (Right)    Patient location: PACU    Anesthesia Type: general    Transport from OR: Transported from OR on room air with adequate spontaneous ventilation    Post pain: adequate analgesia    Post assessment: no apparent anesthetic complications    Post vital signs: stable    Level of consciousness: awake    Nausea/Vomiting: no nausea/vomiting    Complications: none          Last vitals:   Visit Vitals  /89 (BP Location: Right arm, Patient Position: Lying)   Pulse 86   Temp 36.4 °C (97.5 °F)   Resp 14   Wt 49.9 kg (110 lb)   SpO2 100%   BMI 17.75 kg/m²

## 2019-01-03 NOTE — PLAN OF CARE
Problem: Adult Inpatient Plan of Care  Goal: Plan of Care Review  Pt. Orientation unable to assess; calm. No falls no injuries. V/S stable, afebrile. No distress noted. Repositioned q2 hrs. With assist x2. Smith intact draining clear orange urine. Dressings; clean, dry, and intact. Daughter remain at bedside. Scheduled meds given without any difficulty. Call light in reach. Bed alarm on. Bed in lowest position; side rails x 2. Will continue monitor.     01/03/19 1840   Plan of Care Review   Plan of Care Reviewed With patient

## 2019-01-03 NOTE — SUBJECTIVE & OBJECTIVE
Oncology Treatment Plan:   [No treatment plan]    Medications:  Continuous Infusions:   dextrose 5 % and 0.9 % NaCl 125 mL/hr at 01/02/19 1629     Scheduled Meds:   atorvastatin  40 mg Oral Daily    cefTRIAXone (ROCEPHIN) IVPB  1 g Intravenous Q24H    docusate sodium  100 mg Oral BID    [START ON 1/4/2019] enoxaparin  40 mg Subcutaneous Daily    mupirocin  1 g Nasal BID    polyethylene glycol  17 g Oral Daily     PRN Meds:bisacodyl, HYDROmorphone, morphine, morphine, ondansetron, promethazine (PHENERGAN) IVPB, sodium chloride 0.9%, sodium chloride 0.9%     Review of patient's allergies indicates:  No Known Allergies     Past Medical History:   Diagnosis Date    Closed displaced intertrochanteric fracture of right femur 01/02/2019    Dementia     Pressure ulcer     01/02/2019, sacral spine with eschar    Requires assistance with all daily activities     Stroke     Unsteady gait      History reviewed. No pertinent surgical history.  Family History     None        Tobacco Use    Smoking status: Never Smoker    Smokeless tobacco: Never Used   Substance and Sexual Activity    Alcohol use: No     Frequency: Never    Drug use: No    Sexual activity: No     Partners: Female       Review of Systems   Unable to perform ROS: Dementia     Objective:     Vital Signs (Most Recent):  Temp: 97.7 °F (36.5 °C) (01/03/19 1118)  Pulse: 85 (01/03/19 1118)  Resp: 17 (01/03/19 1118)  BP: 115/60 (01/03/19 1118)  SpO2: 98 % (01/03/19 1118) Vital Signs (24h Range):  Temp:  [97.7 °F (36.5 °C)-98.6 °F (37 °C)] 97.7 °F (36.5 °C)  Pulse:  [80-95] 85  Resp:  [16-20] 17  SpO2:  [96 %-99 %] 98 %  BP: (107-127)/(57-67) 115/60     Weight: 49.9 kg (110 lb)  Body mass index is 17.75 kg/m².  Body surface area is 1.52 meters squared.      Intake/Output Summary (Last 24 hours) at 1/3/2019 1510  Last data filed at 1/3/2019 1457  Gross per 24 hour   Intake 900 ml   Output --   Net 900 ml       Physical Exam   HENT:   Head: Normocephalic.    Eyes: Conjunctivae are normal. No scleral icterus.   Neck: Normal range of motion. Neck supple. No thyromegaly present.   Cardiovascular: Normal rate, regular rhythm and normal heart sounds.   No murmur heard.  Pulmonary/Chest: Effort normal and breath sounds normal. He has no wheezes. He has no rales.   Abdominal: Soft. Bowel sounds are normal. There is no hepatosplenomegaly. There is no tenderness. There is no rebound and no guarding.   Musculoskeletal: Normal range of motion. He exhibits no edema.   Neurological: He is alert. No cranial nerve deficit.   Skin: No rash noted. No erythema.   Psychiatric: He has a normal mood and affect.       Significant Labs:   All pertinent labs within the past 24 hours have been reviewed    Diagnostic Results:  I have reviewed and interpreted all pertinent imaging results/findings within the past 24 hours

## 2019-01-03 NOTE — PLAN OF CARE
01/03/19 1641   Discharge Assessment   Assessment Type Discharge Planning Assessment     SW to pt's room to complete assessment, pt off unit.

## 2019-01-03 NOTE — HPI
Pt  is a 74 yo M with a history of stroke and dementia, presents with inability to walk x 1 week. Workup revealed   R hip fracture  1/2/19.  Ortho following and surgical intervention planend today. .Pt lives with his family. . He was also diagnosed with a large  sacral decubitus. Dtr currently at bedside. He is being treated for UTI. The patient's step daughter requested palliative care consult on 1/3 .Workup also included chest film which shows expansile lytic bone lesions involving the posterior aspects of the right 9th and 10th ribs concerning for  Metastatic disease.   Pt recently hospitalized with  confusion and lower abdominal pain. Patient found on CT to have large lobular prostate gland suspicious for BPH or prostatic carcinoma. PSA elevated 13.4ng/ml.  Pt treated with  ABX for presumed prostatitis. Consulted for elevated PSA and lytic lesion

## 2019-01-03 NOTE — ANESTHESIA PREPROCEDURE EVALUATION
01/03/2019  Hugo Lockhart is a 73 y.o., male.    Anesthesia Evaluation     I have reviewed the Nursing Notes.      Review of Systems  Anesthesia Hx:  No problems with previous Anesthesia   Social:  Non-Smoker    Hematology/Oncology:         -- Anemia:   Cardiovascular:   hyperlipidemia    Pulmonary:  Pulmonary Normal    Renal/:  Renal/ Normal     Hepatic/GI:  Hepatic/GI Normal    Musculoskeletal:   Acute Hip fx   Neurological:   CVA, residual symptoms Denies Seizures.  Dementia    Endocrine:  Endocrine Normal    Psych:   Psychiatric History          Physical Exam  General:  Cachexia    Airway/Jaw/Neck:  Airway Findings: Mallampati: II TM Distance: 4 - 6 cm      Dental:  DENTAL FINDINGS: Normal   Chest/Lungs:  Chest/Lungs Clear    Heart/Vascular:  Heart Findings: Normal       Mental Status:  Mental Status Findings:  Cooperative, Alert and Oriented         Anesthesia Plan  Type of Anesthesia, risks & benefits discussed:  Anesthesia Type:  general  Patient's Preference:   Intra-op Monitoring Plan: standard ASA monitors  Intra-op Monitoring Plan Comments:   Post Op Pain Control Plan: multimodal analgesia, IV/PO Opioids PRN and per primary service following discharge from PACU  Post Op Pain Control Plan Comments:   Induction:    Beta Blocker:  Patient is not currently on a Beta-Blocker (No further documentation required).       Informed Consent: Patient understands risks and agrees with Anesthesia plan.  Questions answered. Anesthesia consent signed with patient.  ASA Score: 3     Day of Surgery Review of History & Physical:    H&P update referred to the provider.  H&P completed by Anesthesiologist.   Anesthesia Plan Notes: Hb=10 today  Could consider spinal or GA.   npo        Ready For Surgery From Anesthesia Perspective.

## 2019-01-03 NOTE — SUBJECTIVE & OBJECTIVE
Interval History: No new issues.       Review of Systems   Unable to perform ROS: Dementia     Objective:     Vital Signs (Most Recent):  Temp: 97.7 °F (36.5 °C) (01/03/19 0739)  Pulse: 80 (01/03/19 0739)  Resp: 16 (01/03/19 0739)  BP: 113/61 (01/03/19 0739)  SpO2: 96 % (01/03/19 0739) Vital Signs (24h Range):  Temp:  [97.7 °F (36.5 °C)-98.6 °F (37 °C)] 97.7 °F (36.5 °C)  Pulse:  [] 80  Resp:  [16-24] 16  SpO2:  [95 %-100 %] 96 %  BP: (107-162)/(57-67) 113/61     Weight: 49.9 kg (110 lb)  Body mass index is 17.75 kg/m².    Intake/Output Summary (Last 24 hours) at 1/3/2019 0934  Last data filed at 1/3/2019 0800  Gross per 24 hour   Intake 0 ml   Output --   Net 0 ml      Physical Exam   Constitutional: He appears well-developed and well-nourished.   Cardiovascular: Normal rate and regular rhythm.   Pulmonary/Chest: Breath sounds normal.   Neurological: He is alert.   Skin: Skin is warm and dry.   Nursing note and vitals reviewed.      Significant Labs:   BMP:   Recent Labs   Lab 01/02/19  1057 01/03/19  0338   GLU 96 78    141   K 3.9 3.4*   CL 99 106   CO2 31* 29   BUN 20 16   CREATININE 0.6 0.6   CALCIUM 8.6* 8.0*   MG 1.7  --      CBC:   Recent Labs   Lab 01/02/19  1057 01/03/19  0338   WBC 15.41* 11.50   HGB 11.7* 10.0*   HCT 35.0* 29.7*   * 405*       Significant Imaging:

## 2019-01-03 NOTE — PROGRESS NOTES
[facility] Hospital Medicine  Progress Note    Patient Name: Hugo Lockhart  MRN: [id number]  Patient Class: IP- Inpatient   Admission Date: 1/2/2019  Length of Stay: 1 days  Attending Physician: Mj Esteves MD  Primary Care Provider: [facility]        Subjective:     Principal Problem:Closed displaced intertrochanteric fracture of right femur    HPI:  Mr. Lockhart is a 72 yo M with a history of stroke and dementia, who presents from home per family with a R hip fracture on 1/2/19. The patient is not able to give any history due to dementia. The history was obtained per my discussion with the ED staff as well as review of the ED record.  This patient was just discharged from this hospital on 12/18/18 for suspected urinary retention and possible prostatitis.  No further history/details available about patient's presenting complaint. There was an obvious deformity of the R hip on presentation to the ED. The patient was also found to have a unstageable sacral decub on admission.      Hospital Course:  Mr. Lockhart is a 72 yo M with a history of stroke and dementia, who presents from home per family with a R hip fracture on 1/2/19.  Ortho was consulted. The patient is from home with family. He was also found to have a unstageable sacral decub. The patient's step daughter requested palliative care consult on 1/3/19.      Interval History: No new issues.       Review of Systems   Unable to perform ROS: Dementia     Objective:     Vital Signs (Most Recent):  Temp: 97.7 °F (36.5 °C) (01/03/19 0739)  Pulse: 80 (01/03/19 0739)  Resp: 16 (01/03/19 0739)  BP: 113/61 (01/03/19 0739)  SpO2: 96 % (01/03/19 0739) Vital Signs (24h Range):  Temp:  [97.7 °F (36.5 °C)-98.6 °F (37 °C)] 97.7 °F (36.5 °C)  Pulse:  [] 80  Resp:  [16-24] 16  SpO2:  [95 %-100 %] 96 %  BP: (107-162)/(57-67) 113/61     Weight: 49.9 kg (110 lb)  Body mass index is 17.75 kg/m².    Intake/Output Summary (Last 24 hours)  at 1/3/2019 0934  Last data filed at 1/3/2019 0800  Gross per 24 hour   Intake 0 ml   Output --   Net 0 ml      Physical Exam   Constitutional: He appears well-developed and well-nourished.   Cardiovascular: Normal rate and regular rhythm.   Pulmonary/Chest: Breath sounds normal.   Neurological: He is alert.   Skin: Skin is warm and dry.   Nursing note and vitals reviewed.      Significant Labs:   BMP:   Recent Labs   Lab 01/02/19  1057 01/03/19  0338   GLU 96 78    141   K 3.9 3.4*   CL 99 106   CO2 31* 29   BUN 20 16   CREATININE 0.6 0.6   CALCIUM 8.6* 8.0*   MG 1.7  --      CBC:   Recent Labs   Lab 01/02/19  1057 01/03/19  0338   WBC 15.41* 11.50   HGB 11.7* 10.0*   HCT 35.0* 29.7*   * 405*       Significant Imaging:     Assessment/Plan:      * Closed displaced intertrochanteric fracture of right femur    Not sure of acuity as unable to get history.  Ortho consulted.  Patient is very frail and thin. Will leave up to ortho if appropriate for surgery.  I would expect a possible difficult recovery.  At least for now, no cardio/pulmonary active issues. As of now, patient is supposed to have a nailing done but daughter has many questions for ortho.         Anemia of chronic disease    No acute issues         BPH with urinary obstruction    Recent evaluation 12/18. Sent home on flomax.  Possible prostate CA and the patient was to follow up with urology. Noted lytic bone lesions on x-ray.  Will check a PSA.  Consider further consultation.  PSA 10.8. Consulted oncology. Palliative care also consulted.         History of CVA (cerebrovascular accident)    No acute issues        Dementia with behavioral disturbance    No acute issues.        NO UTI.  Abx. Stopped. Urine culture is NG.     Unstageable sacral decub. Wound care.     VTE Risk Mitigation (From admission, onward)        Ordered     IP VTE HIGH RISK PATIENT  Once      01/02/19 1621        Palliative care. Oncology consult.   Possible to surgery but  daughter has many questions.         Mj Robin MD  Department of Hospital Medicine   [facility]

## 2019-01-03 NOTE — CONSULTS
72 y/o male with dementia c/o about a one week history of inability to walk. His daughter provides the history and states he lives with her brother, who did not witness a fall. His dementia meds were changed recently and resulted in somnolence and increased sedentary behavior. He present with a large sacral decubitus and some minimal breakdowwn over the lateral aspect of his proximal femur. No other known Ortho issues  VSSAF  Rt femur- + obvious deformity proximally with some macerated skin laterally. + swelling and TTP               Pain with attempted ROM . Withdraws from pain. CR brisk    Xrays- displaced ST/IT femur fx  U/A + bacteria    A/P Rt ST/IT femur fx  1. Rec is for IM gamma nail rt femur.  2. Daughter understands the rationale for treatment and signed the necessary consents  3. To OR tomorrow for IM nail rt femur  4. UTI treated with Ceftriaxone

## 2019-01-03 NOTE — OP NOTE
DATE OF PROCEDURE:  01/03/2019.    PREOPERATIVE DIAGNOSIS:  Right subtrochanteric and intertrochanteric femur   fracture.    POSTOPERATIVE DIAGNOSIS:  Right subtrochanteric and intertrochanteric femur   fracture.    PROCEDURE PERFORMED:  IM gamma nail, right subtrochanteric and intertrochanteric   femur fracture.    SURGEON:  Tavares Gao M.D.    ASSISTANT:  None.    COMPLICATIONS:  None.    BLOOD LOSS:  About 100 mL.    IMPLANTS:  Brundidge 10 x 340 nail, 75 mm lag screw, set screw and 42.5 and 40 mm   locking screws.    PROCEDURE IN DETAIL:  After proper consents were obtained, the patient was taken   to the Operating Room and administered general anesthesia.  Right lower   extremity was then isolated in the fracture table and provisional reduction was   performed and checked under fluoroscopy.  Right lower extremity was then prepped   and draped in normal sterile fashion.  Incision was made proximal to the   greater trochanter.  Tissues were spread.  A guidepin was inserted into the tip   of the greater trochanter.  Opening reamer was then passed over the guidewire   after the guidewire was adjusted slightly under fluoroscopic control, guidewire   was then removed.  Long guidewire was then passed distally and depth was   measured, a single pass was made with an 11.5 reamer, which did not touch much,   10 x 340 nail was then assembled on the insertion guide and passed over the long   wire, it was seated to its desired depth and the guidewire was removed.  Lag   screw fixation was then performed using the targeting guide.  Drill tubes were   advanced, incision was made, tissues were spread and drill tubes were advanced   further.  Guidepin was inserted and adjusted under fluoroscopic control.  Depth   was then measured.  One step reaming was then performed and a 75 mm lag screw   was inserted over the guidewire.  Compression was applied and the set screw was   then inserted.  Proximal targeting guide was then  removed from the nail and   final pictures were taken of the proximal end of the nail.  The leg was then   taken out of valgus and perfect circles were gained distally.  Incision was   made.  Tissues were spread.  Freehand technique was utilized and drill bit was   passed across both cortices and through the nail.  Drill was removed and that   was noted to be in good position.  Drill bit was then removed and screw of   appropriate length was inserted.  This was repeated for a second distal locking   screw.  Placement of all hardware and maintenance of fracture reduction were   again checked under fluoroscopy and noted to be appropriate.  Wounds were then   irrigated and closed with 2-0 Vicryl and staples.  Sterile dressings were   applied.  The patient was aroused in Operating Room, transferred to Recovery in   stable condition.      LUIS MANUEL/IN  dd: 01/03/2019 15:13:40 (CST)  td: 01/03/2019 15:39:33 (CST)  Doc ID   #[id number]  Job ID #[id number]    CC:

## 2019-01-03 NOTE — PROGRESS NOTES
PALLIATIVE CARE PROGRESS NOTE:    Patient off unit to O.R.  Will see consult tomorrow.    Luzmaria Brewer, BSN, RN, CCRN, CHPN   Palliative Care Nurse Coordinator   [facility]  [phone]

## 2019-01-04 PROBLEM — E43 SEVERE MALNUTRITION: Status: ACTIVE | Noted: 2019-01-04

## 2019-01-04 LAB
ANION GAP SERPL CALC-SCNC: 4 MMOL/L
BACTERIA UR CULT: NO GROWTH
BASOPHILS # BLD AUTO: 0 K/UL
BASOPHILS NFR BLD: 0 %
BUN SERPL-MCNC: 8 MG/DL
CALCIUM SERPL-MCNC: 7.6 MG/DL
CHLORIDE SERPL-SCNC: 105 MMOL/L
CO2 SERPL-SCNC: 29 MMOL/L
CREAT SERPL-MCNC: 0.5 MG/DL
DIFFERENTIAL METHOD: ABNORMAL
EOSINOPHIL # BLD AUTO: 0.1 K/UL
EOSINOPHIL NFR BLD: 0.3 %
ERYTHROCYTE [DISTWIDTH] IN BLOOD BY AUTOMATED COUNT: 13.9 %
EST. GFR  (AFRICAN AMERICAN): >60 ML/MIN/1.73 M^2
EST. GFR  (NON AFRICAN AMERICAN): >60 ML/MIN/1.73 M^2
GLUCOSE SERPL-MCNC: 121 MG/DL
HCT VFR BLD AUTO: 28.3 %
HCT VFR BLD AUTO: 28.3 %
HGB BLD-MCNC: 9.3 G/DL
HGB BLD-MCNC: 9.3 G/DL
LYMPHOCYTES # BLD AUTO: 0.9 K/UL
LYMPHOCYTES NFR BLD: 6.1 %
MCH RBC QN AUTO: 29.8 PG
MCHC RBC AUTO-ENTMCNC: 32.9 G/DL
MCV RBC AUTO: 91 FL
MONOCYTES # BLD AUTO: 1 K/UL
MONOCYTES NFR BLD: 6.9 %
NEUTROPHILS # BLD AUTO: 12.8 K/UL
NEUTROPHILS NFR BLD: 86.7 %
PLATELET # BLD AUTO: 367 K/UL
PMV BLD AUTO: 9 FL
POTASSIUM SERPL-SCNC: 3.7 MMOL/L
RBC # BLD AUTO: 3.12 M/UL
SODIUM SERPL-SCNC: 138 MMOL/L
WBC # BLD AUTO: 14.77 K/UL

## 2019-01-04 PROCEDURE — 63600175 PHARM REV CODE 636 W HCPCS: Performed by: INTERNAL MEDICINE

## 2019-01-04 PROCEDURE — 99232 SBSQ HOSP IP/OBS MODERATE 35: CPT | Mod: ,,, | Performed by: UROLOGY

## 2019-01-04 PROCEDURE — 80048 BASIC METABOLIC PNL TOTAL CA: CPT

## 2019-01-04 PROCEDURE — 63600175 PHARM REV CODE 636 W HCPCS: Performed by: EMERGENCY MEDICINE

## 2019-01-04 PROCEDURE — 25000003 PHARM REV CODE 250: Performed by: ORTHOPAEDIC SURGERY

## 2019-01-04 PROCEDURE — 99232 PR SUBSEQUENT HOSPITAL CARE,LEVL II: ICD-10-PCS | Mod: ,,, | Performed by: UROLOGY

## 2019-01-04 PROCEDURE — 85025 COMPLETE CBC W/AUTO DIFF WBC: CPT

## 2019-01-04 PROCEDURE — 11000001 HC ACUTE MED/SURG PRIVATE ROOM

## 2019-01-04 PROCEDURE — 63600175 PHARM REV CODE 636 W HCPCS: Performed by: ORTHOPAEDIC SURGERY

## 2019-01-04 PROCEDURE — 36415 COLL VENOUS BLD VENIPUNCTURE: CPT

## 2019-01-04 RX ORDER — LORAZEPAM 2 MG/ML
1 INJECTION INTRAMUSCULAR ONCE AS NEEDED
Status: COMPLETED | OUTPATIENT
Start: 2019-01-04 | End: 2019-01-04

## 2019-01-04 RX ADMIN — MORPHINE SULFATE 2 MG: 10 INJECTION INTRAVENOUS at 01:01

## 2019-01-04 RX ADMIN — MORPHINE SULFATE 2 MG: 10 INJECTION INTRAVENOUS at 09:01

## 2019-01-04 RX ADMIN — LORAZEPAM 1 MG: 2 INJECTION INTRAMUSCULAR; INTRAVENOUS at 11:01

## 2019-01-04 RX ADMIN — MUPIROCIN 1 G: 20 OINTMENT TOPICAL at 10:01

## 2019-01-04 RX ADMIN — MUPIROCIN 1 G: 20 OINTMENT TOPICAL at 09:01

## 2019-01-04 RX ADMIN — MORPHINE SULFATE 2 MG: 10 INJECTION INTRAVENOUS at 05:01

## 2019-01-04 RX ADMIN — ENOXAPARIN SODIUM 40 MG: 100 INJECTION SUBCUTANEOUS at 10:01

## 2019-01-04 RX ADMIN — ONDANSETRON 4 MG: 2 INJECTION INTRAMUSCULAR; INTRAVENOUS at 10:01

## 2019-01-04 RX ADMIN — MORPHINE SULFATE 4 MG: 10 INJECTION INTRAVENOUS at 06:01

## 2019-01-04 RX ADMIN — MORPHINE SULFATE 2 MG: 10 INJECTION INTRAVENOUS at 10:01

## 2019-01-04 NOTE — SUBJECTIVE & OBJECTIVE
Interval History: No new issues     Review of Systems   Unable to perform ROS: Dementia     Objective:     Vital Signs (Most Recent):  Temp: 99.7 °F (37.6 °C) (01/04/19 0316)  Pulse: 99 (01/04/19 0316)  Resp: 18 (01/04/19 0316)  BP: 129/64 (01/04/19 0316)  SpO2: 98 % (01/04/19 0316) Vital Signs (24h Range):  Temp:  [97.4 °F (36.3 °C)-99.8 °F (37.7 °C)] 99.7 °F (37.6 °C)  Pulse:  [76-99] 99  Resp:  [11-18] 18  SpO2:  [96 %-100 %] 98 %  BP: (113-129)/(57-89) 129/64     Weight: 49.9 kg (110 lb)  Body mass index is 17.75 kg/m².    Intake/Output Summary (Last 24 hours) at 1/4/2019 0624  Last data filed at 1/3/2019 1457  Gross per 24 hour   Intake 900 ml   Output --   Net 900 ml      Physical Exam   Constitutional: He appears well-developed and well-nourished.   Cardiovascular: Normal rate and regular rhythm.   Pulmonary/Chest: Breath sounds normal.   Neurological: He is alert.   Skin: Skin is warm and dry.   Nursing note and vitals reviewed.      Significant Labs:   CBC:   Recent Labs   Lab 01/02/19  1057 01/03/19  0338 01/03/19  1527   WBC 15.41* 11.50  --    HGB 11.7* 10.0* 8.2*   HCT 35.0* 29.7* 25.6*   * 405*  --      CMP:   Recent Labs   Lab 01/02/19  1057 01/03/19  0338    141   K 3.9 3.4*   CL 99 106   CO2 31* 29   GLU 96 78   BUN 20 16   CREATININE 0.6 0.6   CALCIUM 8.6* 8.0*   PROT 5.9*  --    ALBUMIN 2.7*  --    BILITOT 1.2*  --    ALKPHOS 217*  --    AST 79*  --    ALT 61*  --    ANIONGAP 8 6*   EGFRNONAA >60 >60       Significant Imaging:

## 2019-01-04 NOTE — NURSING
"Called to room by patient's daughter. Patient's daughter states, "Patient vomited food after she feed him." No evidence of vomiting seen in room. Informed daughter if patient vomits again let me know so that I can assess it. She is concerned.Scured chat Dr. Esteves to see if a swallow study can be ordered and to inform him that PO medication was not given due to vomiting. Zofran via IV to be administered.  Patient resting comfortably; will continue to monitor.   "

## 2019-01-04 NOTE — PLAN OF CARE
Problem: Adult Inpatient Plan of Care  Goal: Plan of Care Review   Pt. AAOx1; very agitated throughout shift. IV Morphine 2mg given q2hrs PRN. No falls no injuries. V/S stable, afebrile. Repositioned q2 hrs. With assist x2. Smith intact draining clear yellow urine. Dressing to right hip; clean, dry, and intact; no drainage, no odor. Daughter remain at bedside. Scheduled meds given without any difficulty. Call light in reach. Bed alarm on. Bed in lowest position; side rails x 2. Will continue monitor.   01/04/19 0379   Plan of Care Review   Plan of Care Reviewed With patient

## 2019-01-04 NOTE — NURSING
Received call from Nuclear Medicine; patient is required to lie still for 30 minutes during bone scan. Request for Ativan to keep patient still. Secure chat sent to MD. New Orders from Dr. Esteves as follows:    Ativan 1 mg once via IV 30 minutes before testing.

## 2019-01-04 NOTE — PROGRESS NOTES
[facility] Hospital Medicine  Progress Note    Patient Name: Hugo Lockhart  MRN: [id number]  Patient Class: IP- Inpatient   Admission Date: 1/2/2019  Length of Stay: 2 days  Attending Physician: Mj Esteves MD  Primary Care Provider: [facility]        Subjective:     Principal Problem:Closed displaced intertrochanteric fracture of right femur    HPI:  Mr. Lockhart is a 72 yo M with a history of stroke and dementia, who presents from home per family with a R hip fracture on 1/2/19. The patient is not able to give any history due to dementia. The history was obtained per my discussion with the ED staff as well as review of the ED record.  This patient was just discharged from this hospital on 12/18/18 for suspected urinary retention and possible prostatitis.  No further history/details available about patient's presenting complaint. There was an obvious deformity of the R hip on presentation to the ED. The patient was also found to have a unstageable sacral decub on admission.      Hospital Course:  Mr. Lockhart is a 72 yo M with a history of stroke and dementia, who presents from home per family with a R hip fracture on 1/2/19.  Ortho was consulted. The patient is from home with family. He was also found to have a unstageable sacral decub. The patient's step daughter requested palliative care consult on 1/3/19.  The patient went for IM nailing of R femur on 1/3/19. Oncology was consulted secondary to elevated PSA and lytic rib lesions found on X-ray. They recommended urology consult.    Interval History: No new issues     Review of Systems   Unable to perform ROS: Dementia     Objective:     Vital Signs (Most Recent):  Temp: 99.7 °F (37.6 °C) (01/04/19 0316)  Pulse: 99 (01/04/19 0316)  Resp: 18 (01/04/19 0316)  BP: 129/64 (01/04/19 0316)  SpO2: 98 % (01/04/19 0316) Vital Signs (24h Range):  Temp:  [97.4 °F (36.3 °C)-99.8 °F (37.7 °C)] 99.7 °F (37.6 °C)  Pulse:  [76-99] 99  Resp:   [11-18] 18  SpO2:  [96 %-100 %] 98 %  BP: (113-129)/(57-89) 129/64     Weight: 49.9 kg (110 lb)  Body mass index is 17.75 kg/m².    Intake/Output Summary (Last 24 hours) at 1/4/2019 0624  Last data filed at 1/3/2019 1457  Gross per 24 hour   Intake 900 ml   Output --   Net 900 ml      Physical Exam   Constitutional: He appears well-developed and well-nourished.   Cardiovascular: Normal rate and regular rhythm.   Pulmonary/Chest: Breath sounds normal.   Neurological: He is alert.   Skin: Skin is warm and dry.   Nursing note and vitals reviewed.      Significant Labs:   CBC:   Recent Labs   Lab 01/02/19  1057 01/03/19 0338 01/03/19  1527   WBC 15.41* 11.50  --    HGB 11.7* 10.0* 8.2*   HCT 35.0* 29.7* 25.6*   * 405*  --      CMP:   Recent Labs   Lab 01/02/19 1057 01/03/19 0338    141   K 3.9 3.4*   CL 99 106   CO2 31* 29   GLU 96 78   BUN 20 16   CREATININE 0.6 0.6   CALCIUM 8.6* 8.0*   PROT 5.9*  --    ALBUMIN 2.7*  --    BILITOT 1.2*  --    ALKPHOS 217*  --    AST 79*  --    ALT 61*  --    ANIONGAP 8 6*   EGFRNONAA >60 >60       Significant Imaging:     Assessment/Plan:      * Closed displaced intertrochanteric fracture of right femur    Not sure of acuity as unable to get history.  Ortho consulted.  Patient is very frail and thin. Will leave up to ortho if appropriate for surgery.  I would expect a possible difficult recovery.  At least for now, no cardio/pulmonary active issues. As of now, patient is supposed to have a nailing done but daughter has many questions for ortho.  S/P IM nailing of R femur on 1/3/19  Not sure of dispo- SNF?        Anemia of chronic disease    No acute issues         BPH with urinary obstruction    Recent evaluation 12/18. Sent home on flomax.  Possible prostate CA and the patient was to follow up with urology. Noted lytic bone lesions on x-ray.  Will check a PSA.  Consider further consultation.  PSA 10.8. Consulted oncology. Palliative care also consulted.  Oncology  will proceed with bone scan. Consulted urology- possible biopsy         History of CVA (cerebrovascular accident)    No acute issues        Dementia with behavioral disturbance    No acute issues.        Moderate malnutrition- will discuss with family.     VTE Risk Mitigation (From admission, onward)        Ordered     enoxaparin injection 40 mg  Daily      01/03/19 1510     Place TANYA hose  Until discontinued      01/03/19 1510     Place sequential compression device  Until discontinued      01/03/19 1510     IP VTE HIGH RISK PATIENT  Once      01/03/19 1510              Mj Robin MD  Department of Hospital Medicine   [facility]

## 2019-01-04 NOTE — PT/OT/SLP PROGRESS
Physical Therapy      Patient Name:  Hugo Lockhart   MRN:  [id number]    Patient not seen today secondary to BRODIE to nuclear med then unable to arouse. Will follow-up 1/5/2018.    Yi Hernandez, PT

## 2019-01-04 NOTE — ASSESSMENT & PLAN NOTE
- Concern for prostate cancer   - May also be related to UTI/catheter  His PSA is only 10.8 and down from 13.4, these numbers are not consistent with metastatic prostate cancer unless he has an extremely high grade tumor.     - Consider for prostate biopsy - risks of infection, bleeding, etc discussed with pt's family.  Discussed this again on 1/4, they want to proceed with a biopsy despite the risks of infection.  He needs to be on antibiotics prior to a biopsy.

## 2019-01-04 NOTE — ASSESSMENT & PLAN NOTE
Recent evaluation 12/18. Sent home on flomax.  Possible prostate CA and the patient was to follow up with urology. Noted lytic bone lesions on x-ray.  Will check a PSA.  Consider further consultation.  PSA 10.8. Consulted oncology. Palliative care also consulted.  Oncology will proceed with bone scan. Consulted urology- possible biopsy

## 2019-01-04 NOTE — ASSESSMENT & PLAN NOTE
- Concern for prostate cancer   - May also be related to UTI/catheter   - Consider for prostate biopsy - risks of infection, bleeding, etc discussed with pt's family

## 2019-01-04 NOTE — PROGRESS NOTES
"[facility]  Adult Nutrition  Progress Note    SUMMARY       Recommendations    1. NPO til SLP eval; Diet advancement/ texture per SLP; rec boost plus tid with po advancement   2. Consider appetite stimulant if consistent with goals of care   3. RD to monitor SLP recs, goals of care     Goals: Initiate nutrition within 72 hrs  Nutrition Goal Status: new  Communication of RD Recs: reviewed with RN      Reason for Assessment    Reason For Assessment: identified at risk by screening criteria  Diagnosis: (Femur fx)  Relevant Medical History: Stroke; s/p mayra placement of femur fx; Dementia; Ca w/u  Interdisciplinary Rounds: did not attend    General Information Comments: Daughter reports pt with emesis during meal; po meds held and swallow eval to be ordered. Daughter reports 1.5 weeks ago pt was feeding self, now is considerably less alert. Reports coughing with solids and liquids. Reports weight decline over past year. NFPE: severe loss of lean body mass noted in clavicles, extremities; moderate to severe loss of fat mass evident in thoracic lumbar region, upper arm area, lower extremities. See malnutrition section for details. Palliative care consulted for goals of care.    Nutrition Discharge Planning: Too soon to determine    Nutrition Risk Screen    Nutrition Risk Screen: large or nonhealing wound, burn or pressure injury    Nutrition/Diet History    Patient Reported Diet/Restrictions/Preferences: general  Food Preferences: recently started on ensure  Spiritual, Cultural Beliefs, Shinto Practices, Values that Affect Care: no  Food Allergies: NKFA  Factors Affecting Nutritional Intake: impaired cognitive status/motor control, difficulty/impaired swallowing    Anthropometrics    Temp: 98.8 °F (37.1 °C)  Height: 5' 6" (167.6 cm)(prev adm records)  Height (inches): 66 in  Weight Method: Estimated  Weight: 49 kg (108 lb 0.4 oz)  Weight (lb): 108.03 lb  Ideal Body Weight (IBW), Male: 142 lb  % Ideal " Body Weight, Male (lb): 76.08 lb  % Ideal Body Weight Malnutrition: 70-79%: moderate deficit  BMI (Calculated): 17.5  BMI Grade: 17 - 18.4 protein-energy malnutrition grade I  Weight Loss: unintentional  Usual Body Weight (UBW), kg: (daughter unsure, but does not think current weight is accurate ( believes weight has been ~ 100#). )       Lab/Procedures/Meds    Pertinent Labs Reviewed: reviewed  Pertinent Labs Comments: UA: ketones; WBC elevated  Pertinent Medications Reviewed: reviewed  Pertinent Medications Comments: IVF, statin    Physical Findings/Assessment     Overall: Generalized wasting, advanced age   Skin: Stage II scapula, incision, R hip wound, coccyx wound    Estimated/Assessed Needs    Weight Used For Calorie Calculations: 49 kg (108 lb 0.4 oz)  Energy Calorie Requirements (kcal):  (1450 kcal for baseline) 9537-4541 kcal for repletion  Energy Need Method: Kcal/kg(35-40)  Protein Requirements: 59-74g (1.2-1.5g/kg)  Weight Used For Protein Calculations: 49 kg (108 lb 0.4 oz)     Estimated Fluid Requirement Method: RDA Method  RDA Method (mL): 0    Nutrition Prescription Ordered    Current Diet Order: Reg    Evaluation of Received Nutrient/Fluid Intake    IV Fluid (mL): 125(ml/hr)  I/O: reviewed  Energy Calories Required: not meeting needs  Protein Required: not meeting needs  Fluid Required: (per MD)  Tolerance: not tolerating  % Intake of Estimated Energy Needs: 0 - 25 %  % Meal Intake: 0 - 25 %    Nutrition Risk    Level of Risk/Frequency of Follow-up: (2 x week)     Assessment and Plan    Malnutrition in the context of Chronic Illness/Injury    Related to (etiology):  Dementia    Signs and Symptoms (as evidenced by):    Body Fat Depletion: moderate and severe depletion of orbitals, triceps and thoracic and lumbar region   Muscle Mass Depletion: severe depletion of temples, clavicle region, scapular region, interosseous muscle and lower extremities     Interventions:  Collaboration with  providers    Nutrition Diagnosis Status:  New       Monitor and Evaluation    Food and Nutrient Intake: energy intake, food and beverage intake  Food and Nutrient Adminstration: diet order  Physical Activity and Function: nutrition-related ADLs and IADLs  Anthropometric Measurements: weight, weight change  Biochemical Data, Medical Tests and Procedures: electrolyte and renal panel  Nutrition-Focused Physical Findings: skin     Malnutrition Assessment  Malnutrition Type: chronic illness  Skin (Micronutrient): wounds unhealed       Subcutaneous Fat (Malnutrition): severe depletion  Muscle Mass (Malnutrition): severe depletion   Orbital Region (Subcutaneous Fat Loss): moderate depletion  Upper Arm Region (Subcutaneous Fat Loss): severe depletion  Thoracic and Lumbar Region: severe depletion   [place] Region (Muscle Loss): moderate depletion  Clavicle Bone Region (Muscle Loss): severe depletion  Clavicle and Acromion Bone Region (Muscle Loss): severe depletion  Scapular Bone Region (Muscle Loss): severe depletion  Dorsal Hand (Muscle Loss): severe depletion  Patellar Region (Muscle Loss): severe depletion  Anterior Thigh Region (Muscle Loss): severe depletion  Posterior Calf Region (Muscle Loss): severe depletion       Subcutaneous Fat Loss (Final Summary): severe protein-calorie malnutrition  Muscle Loss Evaluation (Final Summary): severe protein-calorie malnutrition         Nutrition Follow-Up    RD Follow-up?: Yes

## 2019-01-04 NOTE — PLAN OF CARE
Recommendations     1. NPO til SLP eval; Diet advancement/ texture per SLP; rec boost plus tid with po advancement   2. Consider appetite stimulant if consistent with goals of care   3. RD to monitor SLP recs, goals of care      Goals: Initiate nutrition within 72 hrs  Nutrition Goal Status: new  Communication of RD Recs: reviewed with RN

## 2019-01-04 NOTE — PT/OT/SLP PROGRESS
Occupational Therapy      Patient Name:  Hugo Lockhart   MRN:  [id number]    Patient not seen today secondary to Unavailable (Comment)(off the floor at [facility]). Will follow-up as able.    Attempted to evaluate patient however patient unable to awaken to participate in evaluation.  Family stated patient was given ativan for agitation, and that patient with terminal cancer and may go home with hospice. Will re-attempt evaluation tomorrow.    CECI Preston, MS  1/4/2019

## 2019-01-04 NOTE — SUBJECTIVE & OBJECTIVE
Past Medical History:   Diagnosis Date    Closed displaced intertrochanteric fracture of right femur 01/02/2019    Dementia     Pressure ulcer     01/02/2019, sacral spine with eschar    Requires assistance with all daily activities     Stroke     Unsteady gait        History reviewed. No pertinent surgical history.    Review of patient's allergies indicates:  No Known Allergies    Family History     None          Tobacco Use    Smoking status: Never Smoker    Smokeless tobacco: Never Used   Substance and Sexual Activity    Alcohol use: No     Frequency: Never    Drug use: No    Sexual activity: No     Partners: Female       Review of Systems   Unable to perform ROS: Dementia       Objective:     Temp:  [97.4 °F (36.3 °C)-98 °F (36.7 °C)] 97.4 °F (36.3 °C)  Pulse:  [76-96] 85  Resp:  [11-18] 17  SpO2:  [96 %-100 %] 96 %  BP: (107-123)/(57-89) 116/64     Body mass index is 17.75 kg/m².    Date 01/03/19 0700 - 01/04/19 0659   Shift 3407-7248 3844-6775 8798-1216 24 Hour Total   INTAKE   P.O. 0   0   I.V.(mL/kg) 900(18)   900(18)   Shift Total(mL/kg) 900(18)   900(18)   OUTPUT   Shift Total(mL/kg)       Weight (kg) 49.9 49.9 49.9 49.9          Drains          None          Physical Exam   Vitals reviewed.  Constitutional: He is oriented to person, place, and time. He appears well-developed. No distress.   Thin   HENT:   Head: Normocephalic and atraumatic.   Eyes: Right eye exhibits no discharge. Left eye exhibits no discharge. No scleral icterus.   Neck: Normal range of motion. Neck supple.   Cardiovascular: Normal rate and regular rhythm.    Pulmonary/Chest: Effort normal and breath sounds normal. No respiratory distress.   Abdominal: Soft. Bowel sounds are normal. He exhibits no distension. There is no tenderness. There is no rebound and no guarding.   Genitourinary:   Genitourinary Comments: 16Fr catheter in place - yellow urine   Neurological: He is alert and oriented to person, place, and time.   Skin:  Skin is warm and dry. He is not diaphoretic. No erythema.         Significant Labs:    BMP:  Recent Labs   Lab 01/02/19  1057 01/03/19  0338    141   K 3.9 3.4*   CL 99 106   CO2 31* 29   BUN 20 16   CREATININE 0.6 0.6   CALCIUM 8.6* 8.0*       CBC:  Recent Labs   Lab 01/02/19  1057 01/03/19  0338 01/03/19  1527   WBC 15.41* 11.50  --    HGB 11.7* 10.0* 8.2*   HCT 35.0* 29.7* 25.6*   * 405*  --        Blood Culture:   Recent Labs   Lab 01/02/19  1230 01/02/19  1245   LABBLOO No Growth to date  No Growth to date No Growth to date  No Growth to date     Urine Culture:   Recent Labs   Lab 01/02/19  1219   LABURIN No growth to date     Urine Studies:   Recent Labs   Lab 01/02/19  1219   COLORU Yesi   APPEARANCEUA Clear   PHUR 5.0   SPECGRAV 1.025   PROTEINUA Negative   GLUCUA Negative   KETONESU 1+*   BILIRUBINUA Negative   OCCULTUA Negative   NITRITE Positive*   UROBILINOGEN 4.0-6.0*   LEUKOCYTESUR Negative   RBCUA 1   WBCUA 1   BACTERIA Moderate*   SQUAMEPITHEL 2       Significant Imaging:  All pertinent imaging results/findings from the past 24 hours have been reviewed.

## 2019-01-04 NOTE — CONSULTS
[facility]  Urology  Consult Note    Patient Name: Hugo Lockhart  MRN: [id number]  Admission Date: 1/2/2019  Hospital Length of Stay: 1   Code Status: Full Code   Attending Provider: Mj Esteves MD   Consulting Provider: Melissa Mccarthy MD  Primary Care Physician: [facility]  Principal Problem:Closed displaced intertrochanteric fracture of right femur    Inpatient consult to Urology  Consult performed by: Melissa Mccarthy MD  Consult ordered by: Rosalinda Chowdary MD          Subjective:     HPI:  74yo M with elevated PSA, lytic bone lesions. Dr Vasques saw pt as inpatient consultation 12/18/18 for similar as well as possible UR. Recommended possible prostate biopsy as outpatient. Pt scheduled to see me (Dr. Mccarthy) later this month. He is now admitted for R femur fracture.     PSA 12/18/18 - 13.4, 1/2/19 - 10.8. Concern for prostatitis in 12/18.      History is otherwise limited 2/2 dementia. History provided by chart and pt's daughter.     Past Medical History:   Diagnosis Date    Closed displaced intertrochanteric fracture of right femur 01/02/2019    Dementia     Pressure ulcer     01/02/2019, sacral spine with eschar    Requires assistance with all daily activities     Stroke     Unsteady gait        History reviewed. No pertinent surgical history.    Review of patient's allergies indicates:  No Known Allergies    Family History     None          Tobacco Use    Smoking status: Never Smoker    Smokeless tobacco: Never Used   Substance and Sexual Activity    Alcohol use: No     Frequency: Never    Drug use: No    Sexual activity: No     Partners: Female       Review of Systems   Unable to perform ROS: Dementia       Objective:     Temp:  [97.4 °F (36.3 °C)-98 °F (36.7 °C)] 97.4 °F (36.3 °C)  Pulse:  [76-96] 85  Resp:  [11-18] 17  SpO2:  [96 %-100 %] 96 %  BP: (107-123)/(57-89) 116/64     Body mass index is 17.75 kg/m².    Date 01/03/19 0700 - 01/04/19  0659   Shift 9956-0981 2037-5758 4470-3781 24 Hour Total   INTAKE   P.O. 0   0   I.V.(mL/kg) 900(18)   900(18)   Shift Total(mL/kg) 900(18)   900(18)   OUTPUT   Shift Total(mL/kg)       Weight (kg) 49.9 49.9 49.9 49.9          Drains          None          Physical Exam   Vitals reviewed.  Constitutional: He is oriented to person, place, and time. He appears well-developed. No distress.   Thin   HENT:   Head: Normocephalic and atraumatic.   Eyes: Right eye exhibits no discharge. Left eye exhibits no discharge. No scleral icterus.   Neck: Normal range of motion. Neck supple.   Cardiovascular: Normal rate and regular rhythm.    Pulmonary/Chest: Effort normal and breath sounds normal. No respiratory distress.   Abdominal: Soft. Bowel sounds are normal. He exhibits no distension. There is no tenderness. There is no rebound and no guarding.   Genitourinary:   Genitourinary Comments: 16Fr catheter in place - yellow urine   Neurological: He is alert and oriented to person, place, and time.   Skin: Skin is warm and dry. He is not diaphoretic. No erythema.         Significant Labs:    BMP:  Recent Labs   Lab 01/02/19  1057 01/03/19  0338    141   K 3.9 3.4*   CL 99 106   CO2 31* 29   BUN 20 16   CREATININE 0.6 0.6   CALCIUM 8.6* 8.0*       CBC:  Recent Labs   Lab 01/02/19  1057 01/03/19  0338 01/03/19  1527   WBC 15.41* 11.50  --    HGB 11.7* 10.0* 8.2*   HCT 35.0* 29.7* 25.6*   * 405*  --        Blood Culture:   Recent Labs   Lab 01/02/19  1230 01/02/19  1245   LABBLOO No Growth to date  No Growth to date No Growth to date  No Growth to date     Urine Culture:   Recent Labs   Lab 01/02/19  1219   LABURIN No growth to date     Urine Studies:   Recent Labs   Lab 01/02/19  1219   COLORU Yesi   APPEARANCEUA Clear   PHUR 5.0   SPECGRAV 1.025   PROTEINUA Negative   GLUCUA Negative   KETONESU 1+*   BILIRUBINUA Negative   OCCULTUA Negative   NITRITE Positive*   UROBILINOGEN 4.0-6.0*   LEUKOCYTESUR Negative   RBCUA 1    WBCUA 1   BACTERIA Moderate*   SQUAMEPITHEL 2       Significant Imaging:  All pertinent imaging results/findings from the past 24 hours have been reviewed.          Assessment and Plan:     Elevated PSA     - Concern for prostate cancer   - May also be related to UTI/catheter   - Consider for prostate biopsy - risks of infection, bleeding, etc discussed with pt's family      BPH with urinary obstruction     - Smith now in place   - Concern for prostate cancer         VTE Risk Mitigation (From admission, onward)        Ordered     enoxaparin injection 40 mg  Daily      01/03/19 1510     Place TANYA hose  Until discontinued      01/03/19 1510     Place sequential compression device  Until discontinued      01/03/19 1510     IP VTE HIGH RISK PATIENT  Once      01/03/19 1510          Thank you for your consult. I will follow-up with patient. Please contact us if you have any additional questions.    Melissa Mccarthy MD  Urology  [facility] [place]

## 2019-01-04 NOTE — SUBJECTIVE & OBJECTIVE
Interval History: No new events.    Review of Systems   Constitutional: Negative.  Negative for fever.   HENT: Negative.    Eyes: Negative.    Respiratory: Negative for cough, chest tightness and shortness of breath.    Cardiovascular: Negative for chest pain.   Gastrointestinal: Negative.  Negative for constipation, diarrhea and nausea.   Genitourinary: Negative for hematuria.   Musculoskeletal: Negative.    Neurological: Negative.    Psychiatric/Behavioral: Negative.      Objective:     Temp:  [97.4 °F (36.3 °C)-99.8 °F (37.7 °C)] 98.8 °F (37.1 °C)  Pulse:  [76-99] 92  Resp:  [11-18] 16  SpO2:  [96 %-100 %] 97 %  BP: (113-129)/(57-89) 122/67     Body mass index is 17.75 kg/m².            Drains     Drain                 Urethral Catheter 01/02/19 0300 2 days                Physical Exam   Nursing note and vitals reviewed.  Constitutional: He is oriented to person, place, and time. He appears well-developed and well-nourished.   HENT:   Head: Normocephalic.   Eyes: Conjunctivae are normal.   Neck: Normal range of motion. Neck supple. No tracheal deviation present. No thyromegaly present.   Cardiovascular: Normal rate and normal heart sounds.    Pulmonary/Chest: Effort normal and breath sounds normal. No respiratory distress. He has no wheezes.   Abdominal: Soft. Bowel sounds are normal. There is no hepatosplenomegaly. There is no tenderness. There is no rebound and no CVA tenderness. No hernia.   Genitourinary:   Genitourinary Comments: Smith in place with yellow urine   Musculoskeletal: Normal range of motion. He exhibits no edema or tenderness.   Lymphadenopathy:     He has no cervical adenopathy.   Neurological: He is alert and oriented to person, place, and time.   Skin: Skin is warm and dry. No rash noted. No erythema.     Psychiatric: He has a normal mood and affect. His behavior is normal. Judgment and thought content normal.       Significant Labs:    BMP:  Recent Labs   Lab 01/02/19  1057 01/03/19  9312  01/04/19  0644    141 138   K 3.9 3.4* 3.7   CL 99 106 105   CO2 31* 29 29   BUN 20 16 8   CREATININE 0.6 0.6 0.5   CALCIUM 8.6* 8.0* 7.6*       CBC:   Recent Labs   Lab 01/02/19  1057 01/03/19  0338 01/03/19  1527   WBC 15.41* 11.50  --    HGB 11.7* 10.0* 8.2*   HCT 35.0* 29.7* 25.6*   * 405*  --        Blood Culture:   Recent Labs   Lab 01/02/19  1230 01/02/19  1245   LABBLOO No Growth to date  No Growth to date No Growth to date  No Growth to date     Urine Culture:   Recent Labs   Lab 01/02/19  1219   LABURIN No growth to date       Significant Imaging:

## 2019-01-04 NOTE — ASSESSMENT & PLAN NOTE
Not sure of acuity as unable to get history.  Ortho consulted.  Patient is very frail and thin. Will leave up to ortho if appropriate for surgery.  I would expect a possible difficult recovery.  At least for now, no cardio/pulmonary active issues. As of now, patient is supposed to have a nailing done but daughter has many questions for ortho.  S/P IM nailing of R femur on 1/3/19  Not sure of dispo- SNF?

## 2019-01-04 NOTE — PLAN OF CARE
01/04/19 1427   Discharge Assessment   Assessment Type Discharge Planning Assessment   Confirmed/corrected address and phone number on facesheet? Yes   Assessment information obtained from? Patient   Prior to hospitilization cognitive status: Unable to Assess   Prior to hospitalization functional status: Independent   Current cognitive status: Unable to Assess   Current Functional Status: Independent   Facility Arrived From: Home    Able to Return to Prior Arrangements yes   Is patient able to care for self after discharge? Unable to determine at this time (comments)   Who are your caregiver(s) and their phone number(s)? Kartik pt, son [phone]   Patient's perception of discharge disposition admitted as an inpatient   Readmission Within the Last 30 Days current reason for admission unrelated to previous admission  (Pt was previously in hospital for UTI per son. )   Patient currently being followed by outpatient case management? No   Patient currently receives any other outside agency services? No   Equipment Currently Used at Home none   Do you have any problems affording any of your prescribed medications? No   Is the patient taking medications as prescribed? yes   Does the patient have transportation home? Yes   Transportation Anticipated family or friend will provide   Dialysis Name and Scheduled days N/A    Does the patient receive services at the [facility]? No   Discharge Plan A Home with family;Home Health   Discharge Plan B Skilled Nursing Facility   Patient/Family in Agreement with Plan no     SW to patient's room to discuss Helping the patient manage care at home. Assessment completed by pt's son. Kartik. According to Kartik, he live with pt, and he helps pt get to medical appointments, and around the home.     TN/SW role explained to pt.  Patient identified using two identifiers:  Name and date of birth.    SW's name and contact info placed on white board.     Person who will help at home  if needed:  Kartik, pt son.     Preferred Appointment time:    Pt disposition is unclear at this time. SW will continue to follow.

## 2019-01-04 NOTE — PROGRESS NOTES
[facility]  Urology  Progress Note    Patient Name: Hugo Lockhart  MRN: [id number]  Admission Date: 1/2/2019  Hospital Length of Stay: 2 days  Code Status: Full Code   Attending Provider: Mj Esteves MD   Primary Care Physician: [facility]    Subjective:     HPI:  72yo M with elevated PSA, lytic bone lesions. Dr Vasques saw pt as inpatient consultation 12/18/18 for similar as well as possible UR. Recommended possible prostate biopsy as outpatient. Pt scheduled to see me (Dr. Mccarthy) later this month. He is now admitted for R femur fracture.     PSA 12/18/18 - 13.4, 1/2/19 - 10.8. Concern for prostatitis in 12/18.      History is otherwise limited 2/2 dementia. History provided by chart and pt's daughter.     Interval History: No new events.    Review of Systems   Constitutional: Negative.  Negative for fever.   HENT: Negative.    Eyes: Negative.    Respiratory: Negative for cough, chest tightness and shortness of breath.    Cardiovascular: Negative for chest pain.   Gastrointestinal: Negative.  Negative for constipation, diarrhea and nausea.   Genitourinary: Negative for hematuria.   Musculoskeletal: Negative.    Neurological: Negative.    Psychiatric/Behavioral: Negative.      Objective:     Temp:  [97.4 °F (36.3 °C)-99.8 °F (37.7 °C)] 98.8 °F (37.1 °C)  Pulse:  [76-99] 92  Resp:  [11-18] 16  SpO2:  [96 %-100 %] 97 %  BP: (113-129)/(57-89) 122/67     Body mass index is 17.75 kg/m².            Drains     Drain                 Urethral Catheter 01/02/19 0300 2 days                Physical Exam   Nursing note and vitals reviewed.  Constitutional: He is oriented to person, place, and time. He appears well-developed and well-nourished.   HENT:   Head: Normocephalic.   Eyes: Conjunctivae are normal.   Neck: Normal range of motion. Neck supple. No tracheal deviation present. No thyromegaly present.   Cardiovascular: Normal rate and normal heart sounds.    Pulmonary/Chest: Effort  normal and breath sounds normal. No respiratory distress. He has no wheezes.   Abdominal: Soft. Bowel sounds are normal. There is no hepatosplenomegaly. There is no tenderness. There is no rebound and no CVA tenderness. No hernia.   Genitourinary:   Genitourinary Comments: Smith in place with yellow urine   Musculoskeletal: Normal range of motion. He exhibits no edema or tenderness.   Lymphadenopathy:     He has no cervical adenopathy.   Neurological: He is alert and oriented to person, place, and time.   Skin: Skin is warm and dry. No rash noted. No erythema.     Psychiatric: He has a normal mood and affect. His behavior is normal. Judgment and thought content normal.       Significant Labs:    BMP:  Recent Labs   Lab 01/02/19  1057 01/03/19  0338 01/04/19  0644    141 138   K 3.9 3.4* 3.7   CL 99 106 105   CO2 31* 29 29   BUN 20 16 8   CREATININE 0.6 0.6 0.5   CALCIUM 8.6* 8.0* 7.6*       CBC:   Recent Labs   Lab 01/02/19  1057 01/03/19  0338 01/03/19  1527   WBC 15.41* 11.50  --    HGB 11.7* 10.0* 8.2*   HCT 35.0* 29.7* 25.6*   * 405*  --        Blood Culture:   Recent Labs   Lab 01/02/19  1230 01/02/19  1245   LABBLOO No Growth to date  No Growth to date No Growth to date  No Growth to date     Urine Culture:   Recent Labs   Lab 01/02/19  1219   LABURIN No growth to date       Significant Imaging:                    Assessment/Plan:     Elevated PSA     - Concern for prostate cancer   - May also be related to UTI/catheter  His PSA is only 10.8 and down from 13.4, these numbers are not consistent with metastatic prostate cancer unless he has an extremely high grade tumor.     - Consider for prostate biopsy - risks of infection, bleeding, etc discussed with pt's family.  Discussed this again on 1/4, they want to proceed with a biopsy despite the risks of infection.  He needs to be on antibiotics prior to a biopsy.           BPH with urinary obstruction     - Smith now in place   - Concern for  prostate cancer         VTE Risk Mitigation (From admission, onward)        Ordered     enoxaparin injection 40 mg  Daily      01/03/19 1510     Place TANYA hose  Until discontinued      01/03/19 1510     Place sequential compression device  Until discontinued      01/03/19 1510     IP VTE HIGH RISK PATIENT  Once      01/03/19 1510          W Gerardo Vasques MD  Urology  [facility]

## 2019-01-05 LAB
ANION GAP SERPL CALC-SCNC: 4 MMOL/L
BASOPHILS # BLD AUTO: 0.01 K/UL
BASOPHILS NFR BLD: 0.1 %
BUN SERPL-MCNC: 6 MG/DL
CALCIUM SERPL-MCNC: 7.6 MG/DL
CHLORIDE SERPL-SCNC: 106 MMOL/L
CO2 SERPL-SCNC: 28 MMOL/L
CREAT SERPL-MCNC: 0.5 MG/DL
DIFFERENTIAL METHOD: ABNORMAL
EOSINOPHIL # BLD AUTO: 0.1 K/UL
EOSINOPHIL NFR BLD: 0.6 %
ERYTHROCYTE [DISTWIDTH] IN BLOOD BY AUTOMATED COUNT: 14 %
EST. GFR  (AFRICAN AMERICAN): >60 ML/MIN/1.73 M^2
EST. GFR  (NON AFRICAN AMERICAN): >60 ML/MIN/1.73 M^2
GLUCOSE SERPL-MCNC: 122 MG/DL
HCT VFR BLD AUTO: 27.5 %
HCT VFR BLD AUTO: 27.5 %
HGB BLD-MCNC: 9 G/DL
HGB BLD-MCNC: 9 G/DL
LYMPHOCYTES # BLD AUTO: 0.9 K/UL
LYMPHOCYTES NFR BLD: 6.1 %
MCH RBC QN AUTO: 29.9 PG
MCHC RBC AUTO-ENTMCNC: 32.7 G/DL
MCV RBC AUTO: 91 FL
MONOCYTES # BLD AUTO: 1.1 K/UL
MONOCYTES NFR BLD: 7.3 %
NEUTROPHILS # BLD AUTO: 12.8 K/UL
NEUTROPHILS NFR BLD: 85.9 %
PLATELET # BLD AUTO: 338 K/UL
PMV BLD AUTO: 8.9 FL
POTASSIUM SERPL-SCNC: 3.4 MMOL/L
RBC # BLD AUTO: 3.01 M/UL
SODIUM SERPL-SCNC: 138 MMOL/L
WBC # BLD AUTO: 14.85 K/UL

## 2019-01-05 PROCEDURE — 99232 PR SUBSEQUENT HOSPITAL CARE,LEVL II: ICD-10-PCS | Mod: ,,, | Performed by: UROLOGY

## 2019-01-05 PROCEDURE — 99232 SBSQ HOSP IP/OBS MODERATE 35: CPT | Mod: ,,, | Performed by: UROLOGY

## 2019-01-05 PROCEDURE — 36415 COLL VENOUS BLD VENIPUNCTURE: CPT

## 2019-01-05 PROCEDURE — 63600175 PHARM REV CODE 636 W HCPCS: Performed by: ORTHOPAEDIC SURGERY

## 2019-01-05 PROCEDURE — G8979 MOBILITY GOAL STATUS: HCPCS | Mod: CI | Performed by: PHYSICAL THERAPIST

## 2019-01-05 PROCEDURE — 97162 PT EVAL MOD COMPLEX 30 MIN: CPT | Performed by: PHYSICAL THERAPIST

## 2019-01-05 PROCEDURE — 11000001 HC ACUTE MED/SURG PRIVATE ROOM

## 2019-01-05 PROCEDURE — 85025 COMPLETE CBC W/AUTO DIFF WBC: CPT

## 2019-01-05 PROCEDURE — 25000003 PHARM REV CODE 250: Performed by: ORTHOPAEDIC SURGERY

## 2019-01-05 PROCEDURE — 63600175 PHARM REV CODE 636 W HCPCS: Performed by: EMERGENCY MEDICINE

## 2019-01-05 PROCEDURE — 80048 BASIC METABOLIC PNL TOTAL CA: CPT

## 2019-01-05 PROCEDURE — 25000003 PHARM REV CODE 250: Performed by: EMERGENCY MEDICINE

## 2019-01-05 PROCEDURE — G8988 SELF CARE GOAL STATUS: HCPCS | Mod: CI

## 2019-01-05 PROCEDURE — G8978 MOBILITY CURRENT STATUS: HCPCS | Mod: CL | Performed by: PHYSICAL THERAPIST

## 2019-01-05 PROCEDURE — G8987 SELF CARE CURRENT STATUS: HCPCS | Mod: CM

## 2019-01-05 PROCEDURE — 97165 OT EVAL LOW COMPLEX 30 MIN: CPT

## 2019-01-05 RX ADMIN — DOCUSATE SODIUM 100 MG: 100 CAPSULE, LIQUID FILLED ORAL at 09:01

## 2019-01-05 RX ADMIN — DEXTROSE AND SODIUM CHLORIDE: 5; .9 INJECTION, SOLUTION INTRAVENOUS at 10:01

## 2019-01-05 RX ADMIN — ATORVASTATIN CALCIUM 40 MG: 40 TABLET, FILM COATED ORAL at 10:01

## 2019-01-05 RX ADMIN — MUPIROCIN 1 G: 20 OINTMENT TOPICAL at 10:01

## 2019-01-05 RX ADMIN — ENOXAPARIN SODIUM 40 MG: 100 INJECTION SUBCUTANEOUS at 10:01

## 2019-01-05 RX ADMIN — DEXTROSE AND SODIUM CHLORIDE: 5; .9 INJECTION, SOLUTION INTRAVENOUS at 06:01

## 2019-01-05 RX ADMIN — MORPHINE SULFATE 2 MG: 10 INJECTION INTRAVENOUS at 05:01

## 2019-01-05 RX ADMIN — DEXTROSE AND SODIUM CHLORIDE: 5; .9 INJECTION, SOLUTION INTRAVENOUS at 01:01

## 2019-01-05 RX ADMIN — DOCUSATE SODIUM 100 MG: 100 CAPSULE, LIQUID FILLED ORAL at 10:01

## 2019-01-05 RX ADMIN — POLYETHYLENE GLYCOL 3350 17 G: 17 POWDER, FOR SOLUTION ORAL at 10:01

## 2019-01-05 RX ADMIN — MUPIROCIN 1 G: 20 OINTMENT TOPICAL at 09:01

## 2019-01-05 RX ADMIN — MORPHINE SULFATE 4 MG: 10 INJECTION INTRAVENOUS at 05:01

## 2019-01-05 NOTE — SUBJECTIVE & OBJECTIVE
Interval History: Smith removed this AM. Family reports he has voided. Unsure PVR.    Review of Systems   Unable to perform ROS: Dementia     Objective:     Temp:  [96.9 °F (36.1 °C)-99.4 °F (37.4 °C)] 96.9 °F (36.1 °C)  Pulse:  [] 100  Resp:  [17-18] 17  SpO2:  [93 %-97 %] 94 %  BP: (106-140)/(54-79) 140/79     Body mass index is 17.44 kg/m².            Drains          None          Physical Exam   Vitals reviewed.  Constitutional: He appears well-developed. No distress.   Thin   HENT:   Head: Normocephalic and atraumatic.   Eyes: Right eye exhibits no discharge. Left eye exhibits no discharge. No scleral icterus.   Neck: Normal range of motion. Neck supple.   Cardiovascular: Normal rate and regular rhythm.    Pulmonary/Chest: Effort normal and breath sounds normal. No respiratory distress.   Abdominal: Bowel sounds are normal. He exhibits no distension. There is no tenderness. There is no rebound and no guarding.   Genitourinary:   Genitourinary Comments: Smith removed   Neurological: He is alert.   Skin: Skin is warm and dry. He is not diaphoretic. No erythema.         Significant Labs:    BMP:  Recent Labs   Lab 01/03/19  0338 01/04/19  0644 01/05/19  0749    138 138   K 3.4* 3.7 3.4*    105 106   CO2 29 29 28   BUN 16 8 6*   CREATININE 0.6 0.5 0.5   CALCIUM 8.0* 7.6* 7.6*       CBC:   Recent Labs   Lab 01/03/19  0338 01/03/19  1527 01/04/19  0936 01/05/19  0749   WBC 11.50  --  14.77* 14.85*   HGB 10.0* 8.2* 9.3*  9.3* 9.0*  9.0*   HCT 29.7* 25.6* 28.3*  28.3* 27.5*  27.5*   *  --  367* 338       Blood Culture:   Recent Labs   Lab 01/02/19  1230 01/02/19  1245   LABBLOO No Growth to date  No Growth to date  No Growth to date No Growth to date  No Growth to date  No Growth to date     Urine Culture:   Recent Labs   Lab 01/02/19  1219   LABURIN No growth     Urine Studies:   Recent Labs   Lab 01/02/19  1219   COLORU Yesi   APPEARANCEUA Clear   PHUR 5.0   SPECGRAV 1.025    PROTEINUA Negative   GLUCUA Negative   KETONESU 1+*   BILIRUBINUA Negative   OCCULTUA Negative   NITRITE Positive*   UROBILINOGEN 4.0-6.0*   LEUKOCYTESUR Negative   RBCUA 1   WBCUA 1   BACTERIA Moderate*   SQUAMEPITHEL 2       Significant Imaging:  All pertinent imaging results/findings from the past 24 hours have been reviewed.    Bone scan reviewed

## 2019-01-05 NOTE — ASSESSMENT & PLAN NOTE
- Concern for prostate cancer   - May also be related to UTI/catheter  His PSA is only 10.8 and down from 13.4, these numbers are not consistent with metastatic prostate cancer unless he has an extremely high grade tumor.     - Consider for prostate biopsy - risks of infection, bleeding, etc discussed with pt's family.  Discussed this again on 1/4, they want to proceed with a biopsy despite the risks of infection.  He needs to be on antibiotics prior to a biopsy.     - Doubt he would tolerate an outpatient/bedside procedure

## 2019-01-05 NOTE — PLAN OF CARE
Problem: Pain (Orthopaedic Fracture)  Goal: Acceptable Pain Control  Outcome: Ongoing (interventions implemented as appropriate)  Intervention: Manage Acute Orthopaedic-Related Pain   01/05/19 0605   Prevent and Minimize Fear   Diversional Activities television   Prevent or Manage Pain   Pain Management Interventions care clustered;diversional activity provided;pain management plan reviewed with patient/caregiver;position adjusted;quiet environment facilitated

## 2019-01-05 NOTE — ASSESSMENT & PLAN NOTE
Recent evaluation 12/18. Sent home on flomax.  Possible prostate CA and the patient was to follow up with urology. Noted lytic bone lesions on x-ray.  Will check a PSA.  Consider further consultation.  PSA 10.8. Consulted oncology. Palliative care also consulted.  Oncology will proceed with bone scan. 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 Possible pathologic R hip fx.  Biopsy of prostate pending.

## 2019-01-05 NOTE — PROGRESS NOTES
POD#2  Pt is feeling a little better today and is more alert. Slow progress with PT. He has not eaten since surgery  VSSAF    Rt femur- inc c/d/i  NVI distally  Hematocrit- 27.5    A/P Rt femur fx IM nail  1. Change dsg  2. Up with PT  3. Encourage eating  4. Will need SNF

## 2019-01-05 NOTE — PROGRESS NOTES
[facility] Hospital Medicine  Progress Note    Patient Name: Hugo Lockhart  MRN: [id number]  Patient Class: IP- Inpatient   Admission Date: 1/2/2019  Length of Stay: 3 days  Attending Physician: Mj Esteves MD  Primary Care Provider: [facility]        Subjective:     Principal Problem:Closed displaced intertrochanteric fracture of right femur    HPI:  Mr. Lockhart is a 74 yo M with a history of stroke and dementia, who presents from home per family with a R hip fracture on 1/2/19. The patient is not able to give any history due to dementia. The history was obtained per my discussion with the ED staff as well as review of the ED record.  This patient was just discharged from this hospital on 12/18/18 for suspected urinary retention and possible prostatitis.  No further history/details available about patient's presenting complaint. There was an obvious deformity of the R hip on presentation to the ED. The patient was also found to have a unstageable sacral decub on admission.      Hospital Course:  Mr. Lockhart is a 74 yo M with a history of stroke and dementia, who presents from home per family with a R hip fracture on 1/2/19.  Ortho was consulted. The patient is from home with family. He was also found to have a unstageable sacral decub. The patient's step daughter requested palliative care consult on 1/3/19.  The patient went for IM nailing of R femur on 1/3/19. Oncology was consulted secondary to elevated PSA and lytic rib lesions found on X-ray. They recommended urology consult.  Urology is considering prostate Bx. Oncology ordered bone scan highly suggestive of metastatic disease to bilateral ribs and could not r/o pathologic R hip fracture.      Interval History: No new issues     Review of Systems   Unable to perform ROS: Dementia     Objective:     Vital Signs (Most Recent):  Temp: 98.1 °F (36.7 °C) (01/05/19 0819)  Pulse: 85 (01/05/19 0819)  Resp: 17 (01/05/19  0819)  BP: 135/71 (01/05/19 0819)  SpO2: (!) 93 % (01/05/19 0819) Vital Signs (24h Range):  Temp:  [97.2 °F (36.2 °C)-99.4 °F (37.4 °C)] 98.1 °F (36.7 °C)  Pulse:  [85-99] 85  Resp:  [17-18] 17  SpO2:  [93 %-97 %] 93 %  BP: (106-135)/(54-75) 135/71     Weight: 49 kg (108 lb 0.4 oz)  Body mass index is 17.44 kg/m².    Intake/Output Summary (Last 24 hours) at 1/5/2019 0922  Last data filed at 1/5/2019 0600  Gross per 24 hour   Intake 900 ml   Output 600 ml   Net 300 ml      Physical Exam   Constitutional: He appears well-developed and well-nourished.   Cardiovascular: Normal rate and regular rhythm.   Pulmonary/Chest: Breath sounds normal.   Neurological: He is alert.   Skin: Skin is warm and dry.   Nursing note and vitals reviewed.      Significant Labs:   CBC:   Recent Labs   Lab 01/03/19  1527 01/04/19  0936 01/05/19  0749   WBC  --  14.77* 14.85*   HGB 8.2* 9.3*  9.3* 9.0*  9.0*   HCT 25.6* 28.3*  28.3* 27.5*  27.5*   PLT  --  367* 338     CMP:   Recent Labs   Lab 01/04/19  0644 01/05/19  0749    138   K 3.7 3.4*    106   CO2 29 28   * 122*   BUN 8 6*   CREATININE 0.5 0.5   CALCIUM 7.6* 7.6*   ANIONGAP 4* 4*   EGFRNONAA >60 >60       Significant Imaging:     Assessment/Plan:      * Closed displaced intertrochanteric fracture of right femur    Not sure of acuity as unable to get history.  Ortho consulted.  Patient is very frail and thin. Will leave up to ortho if appropriate for surgery.  I would expect a possible difficult recovery.  At least for now, no cardio/pulmonary active issues. As of now, patient is supposed to have a nailing done but daughter has many questions for ortho.  S/P IM nailing of R femur on 1/3/19  Not sure of dispo- SNF?        Elevated PSA    As above.        Lytic lesion of bone on x-ray    Bone scan confirms.         Anemia of chronic disease    No acute issues         BPH with urinary obstruction    Recent evaluation 12/18. Sent home on flomax.  Possible prostate CA  and the patient was to follow up with urology. Noted lytic bone lesions on x-ray.  Will check a PSA.  Consider further consultation.  PSA 10.8. Consulted oncology. Palliative care also consulted.  Oncology will proceed with bone scan. 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 Possible pathologic R hip fx.  Biopsy of prostate pending.       History of CVA (cerebrovascular accident)    No acute issues        Dementia with behavioral disturbance    No acute issues.          VTE Risk Mitigation (From admission, onward)        Ordered     enoxaparin injection 40 mg  Daily      01/03/19 1510     Place TANYA hose  Until discontinued      01/03/19 1510     Place sequential compression device  Until discontinued      01/03/19 1510     IP VTE HIGH RISK PATIENT  Once      01/03/19 1510        Poor prognosis.  Palliative care will evaluate patient on Monday       Mj Robin MD  Department of Hospital Medicine   [facility]

## 2019-01-05 NOTE — PLAN OF CARE
Problem: Adult Inpatient Plan of Care  Goal: Plan of Care Review  Outcome: Ongoing (interventions implemented as appropriate)   01/04/19 1909   Plan of Care Review   Plan of Care Reviewed With patient;daughter     Patient alert only to self; arouses to pain and touch. Patient remained free from falls, trauma, and injuries throughout shift. Complaints of nausea and vomiting controlled with prn antiemetic. Indicates pain by facial grimaces and moaning; controlled by prn IV analgesics x2. IV fluids and non oral medications administered. Patient not eating. Bone scan performed today. Turned q2 prevent further breakdown moonboots to protect heels on; dressings to wounds dry and intact. Smith catheter in place; to be d/c'd in am. Dressing to R hip clean, dry, and intact.  Daughter remains at bedside; very supportive in care. Ortho, wound care, oncology, and urology following patient. No acute distress noted.

## 2019-01-05 NOTE — PROGRESS NOTES
[facility]  Urology  Progress Note    Patient Name: Hugo Lockhart  MRN: [id number]  Admission Date: 1/2/2019  Hospital Length of Stay: 3 days  Code Status: Full Code   Attending Provider: Mj Esteves MD   Primary Care Physician: [facility]    Subjective:     HPI:  74yo M with elevated PSA, lytic bone lesions. Dr Vasques saw pt as inpatient consultation 12/18/18 for similar as well as possible UR. Recommended possible prostate biopsy as outpatient. Pt scheduled to see me (Dr. Mccarthy) later this month. He is now admitted for R femur fracture.     PSA 12/18/18 - 13.4, 1/2/19 - 10.8. Concern for prostatitis in 12/18.      History is otherwise limited 2/2 dementia. History provided by chart and pt's daughter.     Interval History: Smith removed this AM. Family reports he has voided. Unsure PVR.    Review of Systems   Unable to perform ROS: Dementia     Objective:     Temp:  [96.9 °F (36.1 °C)-99.4 °F (37.4 °C)] 96.9 °F (36.1 °C)  Pulse:  [] 100  Resp:  [17-18] 17  SpO2:  [93 %-97 %] 94 %  BP: (106-140)/(54-79) 140/79     Body mass index is 17.44 kg/m².            Drains          None          Physical Exam   Vitals reviewed.  Constitutional: He appears well-developed. No distress.   Thin   HENT:   Head: Normocephalic and atraumatic.   Eyes: Right eye exhibits no discharge. Left eye exhibits no discharge. No scleral icterus.   Neck: Normal range of motion. Neck supple.   Cardiovascular: Normal rate and regular rhythm.    Pulmonary/Chest: Effort normal and breath sounds normal. No respiratory distress.   Abdominal: Bowel sounds are normal. He exhibits no distension. There is no tenderness. There is no rebound and no guarding.   Genitourinary:   Genitourinary Comments: Smith removed   Neurological: He is alert.   Skin: Skin is warm and dry. He is not diaphoretic. No erythema.         Significant Labs:    BMP:  Recent Labs   Lab 01/03/19  0338 01/04/19  0644 01/05/19  0749   NA  141 138 138   K 3.4* 3.7 3.4*    105 106   CO2 29 29 28   BUN 16 8 6*   CREATININE 0.6 0.5 0.5   CALCIUM 8.0* 7.6* 7.6*       CBC:   Recent Labs   Lab 01/03/19  0338 01/03/19  1527 01/04/19  0936 01/05/19  0749   WBC 11.50  --  14.77* 14.85*   HGB 10.0* 8.2* 9.3*  9.3* 9.0*  9.0*   HCT 29.7* 25.6* 28.3*  28.3* 27.5*  27.5*   *  --  367* 338       Blood Culture:   Recent Labs   Lab 01/02/19  1230 01/02/19  1245   LABBLOO No Growth to date  No Growth to date  No Growth to date No Growth to date  No Growth to date  No Growth to date     Urine Culture:   Recent Labs   Lab 01/02/19  1219   LABURIN No growth     Urine Studies:   Recent Labs   Lab 01/02/19  1219   COLORU Yesi   APPEARANCEUA Clear   PHUR 5.0   SPECGRAV 1.025   PROTEINUA Negative   GLUCUA Negative   KETONESU 1+*   BILIRUBINUA Negative   OCCULTUA Negative   NITRITE Positive*   UROBILINOGEN 4.0-6.0*   LEUKOCYTESUR Negative   RBCUA 1   WBCUA 1   BACTERIA Moderate*   SQUAMEPITHEL 2       Significant Imaging:  All pertinent imaging results/findings from the past 24 hours have been reviewed.    Bone scan reviewed              Assessment/Plan:     Elevated PSA     - Concern for prostate cancer   - May also be related to UTI/catheter  His PSA is only 10.8 and down from 13.4, these numbers are not consistent with metastatic prostate cancer unless he has an extremely high grade tumor.     - Consider for prostate biopsy - risks of infection, bleeding, etc discussed with pt's family.  Discussed this again on 1/4, they want to proceed with a biopsy despite the risks of infection.  He needs to be on antibiotics prior to a biopsy.     - Doubt he would tolerate an outpatient/bedside procedure         BPH with urinary obstruction     - Smith removed this AM   - Check PVRs to ensure adequate emptying         VTE Risk Mitigation (From admission, onward)        Ordered     enoxaparin injection 40 mg  Daily      01/03/19 1510     Place TANYA hose  Until  discontinued      01/03/19 1510     Place sequential compression device  Until discontinued      01/03/19 1510     IP VTE HIGH RISK PATIENT  Once      01/03/19 1510          Melissa Mccarthy MD  Urology  [facility]

## 2019-01-05 NOTE — PT/OT/SLP EVAL
Physical Therapy Evaluation    Patient Name:  Hugo Lockhart   MRN:  [id number]    Recommendations:     Discharge Recommendations:  nursing facility, skilled   Discharge Equipment Recommendations: none   Barriers to discharge: None    Assessment:     Hugo Lockhart is a 73 y.o. male admitted with a medical diagnosis of Closed displaced intertrochanteric fracture of right femur.  He presents with the following impairments/functional limitations:  weakness, impaired endurance, impaired functional mobilty, gait instability, impaired balance, decreased coordination, decreased safety awareness, decreased ROM, impaired skin, decreased lower extremity function, impaired cognition, pain, impaired coordination, orthopedic precautions, edema, impaired joint extensibility.    Rehab Prognosis: Fair; patient would benefit from acute skilled PT services to address these deficits and reach maximum level of function.    Recent Surgery: Procedure(s) (LRB):  INSERTION, INTRAMEDULLARY ZAKIYA, FEMUR (Right) 2 Days Post-Op    Plan:     During this hospitalization, patient to be seen BID to address the identified rehab impairments via gait training, therapeutic activities, therapeutic exercises and progress toward the following goals:    · Plan of Care Expires:  01/18/19    Subjective     Chief Complaint: None stated  Patient/Family Comments/goals: to return to PLOF  Pain/Comfort:  · Pain Rating 1: other (see comments)(- pt unable to quantify.  )  · Location - Side 1: Right  · Location - Orientation 1: lateral  · Location 1: hip  · Pain Addressed 1: Cessation of Activity, Pre-medicate for activity    Patients cultural, spiritual, Restorationist conflicts given the current situation:      Living Environment:  Pt lives with son in a [facility] with 2 JADA and bilateral hand rails for support.   Prior to admission, patients level of function was independent.  Equipment used at home: none.  DME owned (not currently used): none.  Upon discharge,  patient will have assistance from son and self.    Objective:     Communicated with Nurse Anil prior to session.  Patient found all lines intact, call button in reach and daughter present peripheral IV, telemetry  upon PT entry to room.    General Precautions: Standard, fall   Orthopedic Precautions:RLE weight bearing as tolerated   Braces: N/A     Exams:  · Cognitive Exam:  Patient is oriented to A & O x's 0, but able to follow commands with increase reaction time.  · Gross Motor Coordination:  WFL except for R LE 2* recent R hip sx.  · Postural Exam:  Patient presented with the following abnormalities:    · -       Rounded shoulders  · -       Forward head  · -       Abnormal trunk flexion  · Skin Integrity/Edema:      · -       Skin integrity: R hip incision site covered with surgical bandages.   · -       Edema: Moderate R hip  · RLE ROM: Deficits: DNT 2* recent sx  · RLE Strength: Deficits: DNT 2* recent sx  · LLE ROM: WFL  · LLE Strength: WFL    Functional Mobility:  · Bed Mobility:     · Supine to Sit: moderate assistance  · Sit to Supine: moderate assistance  · Transfers:     · Sit to Stand:  maximal assistance and of 2 persons with rolling walker  · Gait: cross-over gait for 5' with RW and Max A x's 2.  Pt required increase weight bearing support during L LE swing phase.        AM-PAC 6 CLICK MOBILITY  Total Score:13     Patient left supine with all lines intact, call button in reach and daughter present.    GOALS:   Multidisciplinary Problems     Physical Therapy Goals        Problem: Physical Therapy Goal    Goal Priority Disciplines Outcome Goal Variances Interventions   Physical Therapy Goal     PT, PT/OT      Description:  Goals to be met by: 2019     Patient will increase functional independence with mobility by performin. Supine to sit with Modified [place]  2. Sit to supine with Modified [place]  3. Sit to stand transfer with Modified [place]  4. Gait  x 200 feet with  Modified [place] using Rolling Walker.    Recommend: SNF at time of discharge.                          History:     Past Medical History:   Diagnosis Date    Closed displaced intertrochanteric fracture of right femur 01/02/2019    Dementia     Pressure ulcer     01/02/2019, sacral spine with eschar    Requires assistance with all daily activities     Stroke     Unsteady gait        Past Surgical History:   Procedure Laterality Date    INSERTION, INTRAMEDULLARY ZAKIYA, FEMUR Right 1/3/2019    Performed by Tavares Gao MD at [facility] OR       Time Tracking:     PT Received On: 01/05/19  PT Start Time: 0930     PT Stop Time: 1000  PT Total Time (min): 30 min     Billable Minutes: Gait Training  30 min    Juan Manuel Owens, PT  01/05/2019

## 2019-01-05 NOTE — NURSING
Smith discontinued with 75 cc of clear yellow urine. Instructed daughter and pt to call the nurse for first void. Diaper placed. Tolerated well.

## 2019-01-05 NOTE — PLAN OF CARE
Problem: Physical Therapy Goal  Goal: Physical Therapy Goal  Goals to be met by: 2019     Patient will increase functional independence with mobility by performin. Supine to sit with Modified [place]  2. Sit to supine with Modified [place]  3. Sit to stand transfer with Modified [place]  4. Gait  x 200 feet with Modified [place] using Rolling Walker.    Recommend: SNF at time of discharge.        Juan Manuel Owens, PT  2018

## 2019-01-05 NOTE — SUBJECTIVE & OBJECTIVE
Interval History: No new issues     Review of Systems   Unable to perform ROS: Dementia     Objective:     Vital Signs (Most Recent):  Temp: 98.1 °F (36.7 °C) (01/05/19 0819)  Pulse: 85 (01/05/19 0819)  Resp: 17 (01/05/19 0819)  BP: 135/71 (01/05/19 0819)  SpO2: (!) 93 % (01/05/19 0819) Vital Signs (24h Range):  Temp:  [97.2 °F (36.2 °C)-99.4 °F (37.4 °C)] 98.1 °F (36.7 °C)  Pulse:  [85-99] 85  Resp:  [17-18] 17  SpO2:  [93 %-97 %] 93 %  BP: (106-135)/(54-75) 135/71     Weight: 49 kg (108 lb 0.4 oz)  Body mass index is 17.44 kg/m².    Intake/Output Summary (Last 24 hours) at 1/5/2019 0922  Last data filed at 1/5/2019 0600  Gross per 24 hour   Intake 900 ml   Output 600 ml   Net 300 ml      Physical Exam   Constitutional: He appears well-developed and well-nourished.   Cardiovascular: Normal rate and regular rhythm.   Pulmonary/Chest: Breath sounds normal.   Neurological: He is alert.   Skin: Skin is warm and dry.   Nursing note and vitals reviewed.      Significant Labs:   CBC:   Recent Labs   Lab 01/03/19  1527 01/04/19  0936 01/05/19  0749   WBC  --  14.77* 14.85*   HGB 8.2* 9.3*  9.3* 9.0*  9.0*   HCT 25.6* 28.3*  28.3* 27.5*  27.5*   PLT  --  367* 338     CMP:   Recent Labs   Lab 01/04/19  0644 01/05/19  0749    138   K 3.7 3.4*    106   CO2 29 28   * 122*   BUN 8 6*   CREATININE 0.5 0.5   CALCIUM 7.6* 7.6*   ANIONGAP 4* 4*   EGFRNONAA >60 >60       Significant Imaging:

## 2019-01-06 LAB
ANION GAP SERPL CALC-SCNC: 4 MMOL/L
BASOPHILS # BLD AUTO: 0 K/UL
BASOPHILS NFR BLD: 0 %
BUN SERPL-MCNC: 4 MG/DL
CALCIUM SERPL-MCNC: 7.4 MG/DL
CHLORIDE SERPL-SCNC: 105 MMOL/L
CO2 SERPL-SCNC: 29 MMOL/L
CREAT SERPL-MCNC: 0.5 MG/DL
DIFFERENTIAL METHOD: ABNORMAL
EOSINOPHIL # BLD AUTO: 0.1 K/UL
EOSINOPHIL NFR BLD: 0.8 %
ERYTHROCYTE [DISTWIDTH] IN BLOOD BY AUTOMATED COUNT: 13.9 %
EST. GFR  (AFRICAN AMERICAN): >60 ML/MIN/1.73 M^2
EST. GFR  (NON AFRICAN AMERICAN): >60 ML/MIN/1.73 M^2
GLUCOSE SERPL-MCNC: 115 MG/DL
HCT VFR BLD AUTO: 24.9 %
HCT VFR BLD AUTO: 24.9 %
HGB BLD-MCNC: 8.4 G/DL
HGB BLD-MCNC: 8.4 G/DL
LYMPHOCYTES # BLD AUTO: 1.1 K/UL
LYMPHOCYTES NFR BLD: 9.4 %
MAGNESIUM SERPL-MCNC: 1.4 MG/DL
MCH RBC QN AUTO: 30.7 PG
MCHC RBC AUTO-ENTMCNC: 33.7 G/DL
MCV RBC AUTO: 91 FL
MONOCYTES # BLD AUTO: 0.8 K/UL
MONOCYTES NFR BLD: 7.1 %
NEUTROPHILS # BLD AUTO: 9.7 K/UL
NEUTROPHILS NFR BLD: 82.7 %
PLATELET # BLD AUTO: 336 K/UL
PMV BLD AUTO: 9.4 FL
POTASSIUM SERPL-SCNC: 3.1 MMOL/L
RBC # BLD AUTO: 2.74 M/UL
SODIUM SERPL-SCNC: 138 MMOL/L
WBC # BLD AUTO: 11.71 K/UL

## 2019-01-06 PROCEDURE — 99232 SBSQ HOSP IP/OBS MODERATE 35: CPT | Mod: ,,, | Performed by: UROLOGY

## 2019-01-06 PROCEDURE — 97530 THERAPEUTIC ACTIVITIES: CPT

## 2019-01-06 PROCEDURE — 25000003 PHARM REV CODE 250: Performed by: ORTHOPAEDIC SURGERY

## 2019-01-06 PROCEDURE — 25000003 PHARM REV CODE 250: Performed by: INTERNAL MEDICINE

## 2019-01-06 PROCEDURE — 99232 PR SUBSEQUENT HOSPITAL CARE,LEVL II: ICD-10-PCS | Mod: ,,, | Performed by: UROLOGY

## 2019-01-06 PROCEDURE — 63600175 PHARM REV CODE 636 W HCPCS: Performed by: ORTHOPAEDIC SURGERY

## 2019-01-06 PROCEDURE — 36415 COLL VENOUS BLD VENIPUNCTURE: CPT

## 2019-01-06 PROCEDURE — 63600175 PHARM REV CODE 636 W HCPCS: Performed by: INTERNAL MEDICINE

## 2019-01-06 PROCEDURE — 83735 ASSAY OF MAGNESIUM: CPT

## 2019-01-06 PROCEDURE — 25000003 PHARM REV CODE 250: Performed by: UROLOGY

## 2019-01-06 PROCEDURE — 85025 COMPLETE CBC W/AUTO DIFF WBC: CPT

## 2019-01-06 PROCEDURE — 11000001 HC ACUTE MED/SURG PRIVATE ROOM

## 2019-01-06 PROCEDURE — 63600175 PHARM REV CODE 636 W HCPCS: Performed by: EMERGENCY MEDICINE

## 2019-01-06 PROCEDURE — 97110 THERAPEUTIC EXERCISES: CPT

## 2019-01-06 PROCEDURE — 25000003 PHARM REV CODE 250: Performed by: EMERGENCY MEDICINE

## 2019-01-06 PROCEDURE — 80048 BASIC METABOLIC PNL TOTAL CA: CPT

## 2019-01-06 RX ORDER — POTASSIUM CHLORIDE 750 MG/1
50 TABLET, EXTENDED RELEASE ORAL ONCE
Status: COMPLETED | OUTPATIENT
Start: 2019-01-06 | End: 2019-01-06

## 2019-01-06 RX ORDER — PHENAZOPYRIDINE HYDROCHLORIDE 100 MG/1
200 TABLET, FILM COATED ORAL
Status: DISCONTINUED | OUTPATIENT
Start: 2019-01-06 | End: 2019-01-11 | Stop reason: HOSPADM

## 2019-01-06 RX ORDER — MAGNESIUM SULFATE HEPTAHYDRATE 40 MG/ML
2 INJECTION, SOLUTION INTRAVENOUS ONCE
Status: COMPLETED | OUTPATIENT
Start: 2019-01-06 | End: 2019-01-06

## 2019-01-06 RX ADMIN — DOCUSATE SODIUM 100 MG: 100 CAPSULE, LIQUID FILLED ORAL at 09:01

## 2019-01-06 RX ADMIN — MORPHINE SULFATE 4 MG: 10 INJECTION INTRAVENOUS at 01:01

## 2019-01-06 RX ADMIN — MUPIROCIN 1 G: 20 OINTMENT TOPICAL at 09:01

## 2019-01-06 RX ADMIN — POTASSIUM CHLORIDE 50 MEQ: 750 TABLET, EXTENDED RELEASE ORAL at 10:01

## 2019-01-06 RX ADMIN — DEXTROSE AND SODIUM CHLORIDE: 5; .9 INJECTION, SOLUTION INTRAVENOUS at 10:01

## 2019-01-06 RX ADMIN — ENOXAPARIN SODIUM 40 MG: 100 INJECTION SUBCUTANEOUS at 09:01

## 2019-01-06 RX ADMIN — MORPHINE SULFATE 4 MG: 10 INJECTION INTRAVENOUS at 10:01

## 2019-01-06 RX ADMIN — POLYETHYLENE GLYCOL 3350 17 G: 17 POWDER, FOR SOLUTION ORAL at 09:01

## 2019-01-06 RX ADMIN — PHENAZOPYRIDINE HYDROCHLORIDE 200 MG: 100 TABLET ORAL at 11:01

## 2019-01-06 RX ADMIN — MORPHINE SULFATE 4 MG: 10 INJECTION INTRAVENOUS at 05:01

## 2019-01-06 RX ADMIN — MORPHINE SULFATE 4 MG: 10 INJECTION INTRAVENOUS at 02:01

## 2019-01-06 RX ADMIN — MAGNESIUM SULFATE IN WATER 2 G: 40 INJECTION, SOLUTION INTRAVENOUS at 11:01

## 2019-01-06 RX ADMIN — ATORVASTATIN CALCIUM 40 MG: 40 TABLET, FILM COATED ORAL at 09:01

## 2019-01-06 RX ADMIN — DEXTROSE AND SODIUM CHLORIDE: 5; .9 INJECTION, SOLUTION INTRAVENOUS at 01:01

## 2019-01-06 NOTE — SUBJECTIVE & OBJECTIVE
Interval History: No new issues.     Review of Systems   Unable to perform ROS: Dementia     Objective:     Vital Signs (Most Recent):  Temp: 98.7 °F (37.1 °C) (01/06/19 0831)  Pulse: 87 (01/06/19 0831)  Resp: 18 (01/06/19 0831)  BP: 131/64 (01/06/19 0831)  SpO2: 97 % (01/06/19 0831) Vital Signs (24h Range):  Temp:  [96.9 °F (36.1 °C)-99.2 °F (37.3 °C)] 98.7 °F (37.1 °C)  Pulse:  [] 87  Resp:  [17-18] 18  SpO2:  [92 %-97 %] 97 %  BP: (123-140)/(56-79) 131/64     Weight: 49 kg (108 lb 0.4 oz)  Body mass index is 17.44 kg/m².    Intake/Output Summary (Last 24 hours) at 1/6/2019 0932  Last data filed at 1/5/2019 1800  Gross per 24 hour   Intake 0 ml   Output 2 ml   Net -2 ml      Physical Exam   Constitutional: He appears well-developed and well-nourished.   Cardiovascular: Normal rate and regular rhythm.   Pulmonary/Chest: Breath sounds normal.   Neurological: He is alert.   Skin: Skin is warm and dry.   Nursing note and vitals reviewed.      Significant Labs:   BMP:   Recent Labs   Lab 01/06/19  0529   *      K 3.1*      CO2 29   BUN 4*   CREATININE 0.5   CALCIUM 7.4*     CBC:   Recent Labs   Lab 01/04/19  0936 01/05/19  0749 01/06/19  0529   WBC 14.77* 14.85* 11.71   HGB 9.3*  9.3* 9.0*  9.0* 8.4*  8.4*   HCT 28.3*  28.3* 27.5*  27.5* 24.9*  24.9*   * 338 336       Significant Imaging:

## 2019-01-06 NOTE — PLAN OF CARE
Problem: Occupational Therapy Goal  Goal: Occupational Therapy Goal  Goals to be met by: 1/19/19    Patient will increase functional independence with ADLs by performing:    Feeding with [id number] and verbal cues.  UE Dressing with Stand-by Assistance and verbal cues.  Grooming while seated with Stand-by Assistance and verbal cues.  Sitting at edge of bed 15 minutes with Supervision to perfom ADLs with verbal cues.      Outcome: Ongoing (interventions implemented as appropriate)  OT evaluation completed.  All goals are ongoing.

## 2019-01-06 NOTE — PT/OT/SLP EVAL
Occupational Therapy   Evaluation    Name: Hugo Lockhart  MRN: [id number]  Admitting Diagnosis:  Closed displaced intertrochanteric fracture of right femur 2 Days Post-Op    Recommendations:     Discharge Recommendations: nursing facility, skilled  Discharge Equipment Recommendations:  none  Barriers to discharge:  None    History:     Occupational Profile:  Living Environment: The pt lives with his son in a H with 2 steps to enter, and bilateral handrails.  Previous level of function: Mod I with ADLs prior to 12/16/2018 per daughter. Since that day the pt has been nonambulatory, and dependent for ADLs.  Roles and Routines: Retired  Equipment Used at Home:  none  Assistance upon Discharge: The pt will have assistance from family upon discharge.    Past Medical History:   Diagnosis Date    Closed displaced intertrochanteric fracture of right femur 01/02/2019    Dementia     Pressure ulcer     01/02/2019, sacral spine with eschar    Requires assistance with all daily activities     Stroke     Unsteady gait          Past Surgical History:   Procedure Laterality Date    INSERTION, INTRAMEDULLARY ZAKIYA, FEMUR Right 1/3/2019    Performed by T[facility]res Gao [facility] at [facility] OR       Subjective     Chief Complaint: None  Patient/Family Comments/goals: The family would like the pt to be able to ambulate again if possible, and perform simple ADLs.    Pain/Comfort:  Pain Rating 1: 0/10    Patients cultural, spiritual, Rastafari conflicts given the current situation:  None    Objective:     Communicated with: Anil prior to session.  Patient found with: all lines intact, call button in reach and son and daughter present upon OT entry to room.    General Precautions: Standard,   Falls  Orthopedic Precautions:  RLE weightbearing as tolerated.  Braces:   N/A    Occupational Performance:    Bed Mobility:    · Patient completed Rolling/Turning to Left with  moderate assistance  · Patient completed Rolling/Turning to Right with  "moderate assistance  · Patient completed Scooting/Bridging with moderate assistance  · Patient completed Supine to Sit with moderate assistance    Functional Mobility/Transfers:  · Not tested    Activities of Daily Living:  · Feeding:  Total assistance    Cognitive/Visual Perceptual:  Cognitive/Psychosocial Skills:     -       Oriented to: 0.  Pt is confused, and is having trouble following 1 step commands. The daughter states that the pt is in  Late Stage Alzheimer's.     Physical Exam:  Upper Extremity Range of Motion:     -       Right Upper Extremity: WFL  -       Left Upper Extremity: WFL  Upper Extremity Strength:    -       Right Upper Extremity: WFL  -       Left Upper Extremity: WFL    AMPAC 6 Click ADL:  AMPAC Total Score: 7    Treatment & Education:  The pt performed self feeing with Total A, and the patient's family was educated on the role of OT.  Education:    Patient left HOB elevated with all lines intact, call button in reach and son and daughter present    Assessment:     Hugo Lockhart is a 73 y.o. male with a medical diagnosis of Closed displaced intertrochanteric fracture of right femur.  He presents with the following performance deficits affecting function: weakness, impaired endurance, impaired self care skills, impaired functional mobilty, gait instability, impaired balance, decreased coordination, decreased upper extremity function, decreased lower extremity function, decreased safety awareness, decreased ROM, impaired coordination, impaired fine motor, impaired skin, orthopedic precautions, edema.      Rehab Prognosis: Poor; patient would benefit from acute skilled OT services to address these deficits and reach maximum level of function.         Clinical Decision Makin.  OT Low:  "Pt evaluation falls under low complexity for evaluation coding due to performance deficits noted in 1-3 areas as stated above and 0 co-morbities affecting current functional status. Data obtained " "from problem focused assessments. No modifications or assistance was required for completion of evaluation. Only brief occupational profile and history review completed."     Plan:     Patient to be seen 5 x/week to address the above listed problems via self-care/home management, therapeutic activities, therapeutic exercises, neuromuscular re-education  · Plan of Care Expires:    · Plan of Care Reviewed with: patient, daughter, son    This Plan of care has been discussed with the patient who was involved in its development and understands and is in agreement with the identified goals and treatment plan    GOALS:   Multidisciplinary Problems     Occupational Therapy Goals        Problem: Occupational Therapy Goal    Goal Priority Disciplines Outcome Interventions   Occupational Therapy Goal     OT, PT/OT Ongoing (interventions implemented as appropriate)    Description:  Goals to be met by: 1/19/19    Patient will increase functional independence with ADLs by performing:    Feeding with [id number] and verbal cues.  UE Dressing with Stand-by Assistance and verbal cues.  Grooming while seated with Stand-by Assistance and verbal cues.  Sitting at edge of bed 15 minutes with Supervision to perfom ADLs with verbal cues.                        Time Tracking:     OT Date of Treatment: 01/05/19  OT Start Time: 1651  OT Stop Time: 1705  OT Total Time (min): 14 min    Billable Minutes:Evaluation 14 minutes    Terrie Castro OT  1/5/2019    "

## 2019-01-06 NOTE — PLAN OF CARE
Problem: Physical Therapy Goal  Goal: Physical Therapy Goal  Goals to be met by: 2019     Patient will increase functional independence with mobility by performin. Supine to sit with Modified [place]  2. Sit to supine with Modified [place]  3. Sit to stand transfer with Modified [place]  4. Gait  x 200 feet with Modified [place] using Rolling Walker.    Recommend: SNF at time of discharge.         Outcome: Ongoing (interventions implemented as appropriate)  Pt limited with sitting EOB due to agitated and complaints of pain.  Pt resisting sitting EOB and trying to return supine.  PROM performed to RLE in all available planes in supine.

## 2019-01-06 NOTE — PLAN OF CARE
Problem: Adult Inpatient Plan of Care  Goal: Plan of Care Review  Outcome: Ongoing (interventions implemented as appropriate)   01/06/19 0522   Plan of Care Review   Plan of Care Reviewed With patient   Patient alert to self. Remains free from fall or injury during shift. IVF infusing as ordered. All due meds administered as ordered. Turn q2 throughout shift. Dressing to R hip, thigh, leg and sacrum CDI. Daughter at bedside. Call light in reach, bed in low locked position with bed alarm armed and audible.

## 2019-01-06 NOTE — PROGRESS NOTES
Previous entries noted  Dtr at bedside  Pt considered for prostate bx  Bone scan concerning for bony mets  Awaiting placement

## 2019-01-06 NOTE — PT/OT/SLP PROGRESS
"Physical Therapy Treatment    Patient Name:  Hugo Lockhart   MRN:  [id number]    Recommendations:     Discharge Recommendations:  nursing facility, skilled   Discharge Equipment Recommendations: none   Barriers to discharge: decreased mobility and cognition    Assessment:     Hugo Lockhart is a 73 y.o. male admitted with a medical diagnosis of Closed displaced intertrochanteric fracture of right femur.  He presents with the following impairments/functional limitations:  weakness, impaired endurance, impaired self care skills, impaired functional mobilty, decreased safety awareness, impaired cognition, impaired coordination, gait instability, decreased coordination, pain, impaired balance, decreased upper extremity function, decreased lower extremity function, decreased ROM, impaired skin, edema, orthopedic precautions.  Pt limited with sitting EOB due to agitated and complaints of pain.  Pt resisting sitting EOB and trying to return supine.  PROM performed to RLE in all available planes in supine.    Rehab Prognosis: Fair; patient would benefit from acute skilled PT services to address these deficits and reach maximum level of function.    Recent Surgery: Procedure(s) (LRB):  INSERTION, INTRAMEDULLARY ZAKIYA, FEMUR (Right) 3 Days Post-Op    Plan:     During this hospitalization, patient to be seen BID to address the identified rehab impairments via gait training, therapeutic activities, therapeutic exercises and progress toward the following goals:    · Plan of Care Expires:  01/18/19    Subjective     Chief Complaint: abdominal pain  Patient/Family Comments/goals: Pt states " My stomach hurts, please.)  Pain/Comfort:  · Pain Rating 1: (yes unable to rate )      Objective:     Communicated with pt's nurse, Emigdio, prior to session.  Patient found HOB elevated and daughter present telemetry, peripheral IV  upon PT entry to room.     General Precautions: Standard, fall   Orthopedic Precautions:RLE weight bearing " as tolerated   Braces:       Functional Mobility:  · Bed Mobility:Requires extra time to perform due to pt being resistant.  Rolling performed for doffing and donning of brief with orin-care.   · Rolling Left:  minimum assistance  · Rolling Right: minimum assistance  · Scooting: dependence  · Supine to Sit: dependence  · Sit to Supine: dependence  · Balance: poor (Pt resisting to sit EOB due to abdominal pain.)  Pt tolerated sitting EOB ~10 minutes with dep A.)      AM-PAC 6 CLICK MOBILITY  Turning over in bed (including adjusting bedclothes, sheets and blankets)?: 3  Sitting down on and standing up from a chair with arms (e.g., wheelchair, bedside commode, etc.): 2  Moving from lying on back to sitting on the side of the bed?: 2  Moving to and from a bed to a chair (including a wheelchair)?: 2  Need to walk in hospital room?: 2  Climbing 3-5 steps with a railing?: 1  Basic Mobility Total Score: 12       Therapeutic Activities and Exercises:   PROM performed to RLE in all planes in supine position.  Pt unable to perform due to cognition.    Patient left HOB elevated with all lines intact, call button in reach, bed alarm on and pt's nurseBridgette notified..    GOALS:   Multidisciplinary Problems     Physical Therapy Goals        Problem: Physical Therapy Goal    Goal Priority Disciplines Outcome Goal Variances Interventions   Physical Therapy Goal     PT, PT/OT      Description:  Goals to be met by: 2019     Patient will increase functional independence with mobility by performin. Supine to sit with Modified [place]  2. Sit to supine with Modified [place]  3. Sit to stand transfer with Modified [place]  4. Gait  x 200 feet with Modified [place] using Rolling Walker.    Recommend: SNF at time of discharge.                          Time Tracking:     PT Received On: 19  PT Start Time: 1017     PT Stop Time: 1051  PT Total Time (min): 34 min     Billable Minutes: Therapeutic  Activity 17 and Therapeutic Exercise 17    Treatment Type: Treatment  PT/PTA: PTA     PTA Visit Number: 1     Corine Mensah, PTA  01/06/2019

## 2019-01-06 NOTE — SUBJECTIVE & OBJECTIVE
Interval History: Pt's family reports significant dysuria and pain overnight. Unsure PVRs.     Review of Systems   Unable to perform ROS: Dementia     Objective:     Temp:  [96.9 °F (36.1 °C)-99.2 °F (37.3 °C)] 98.7 °F (37.1 °C)  Pulse:  [] 87  Resp:  [17-18] 18  SpO2:  [92 %-97 %] 97 %  BP: (123-140)/(56-79) 131/64     Body mass index is 17.44 kg/m².            Drains          None          Physical Exam   Vitals reviewed.  Constitutional: He appears well-developed. No distress.   Thin   HENT:   Head: Normocephalic and atraumatic.   Eyes: Right eye exhibits no discharge. Left eye exhibits no discharge. No scleral icterus.   Neck: Normal range of motion. Neck supple.   Cardiovascular: Normal rate and regular rhythm.    Pulmonary/Chest: Effort normal and breath sounds normal. No respiratory distress.   Abdominal: Bowel sounds are normal. He exhibits no distension. There is no tenderness. There is no rebound and no guarding.   Genitourinary:   Genitourinary Comments: Smith removed  Bladder nonpalpable   Skin: Skin is warm and dry. He is not diaphoretic. No erythema.         Significant Labs:    BMP:  Recent Labs   Lab 01/04/19  0644 01/05/19  0749 01/06/19  0529    138 138   K 3.7 3.4* 3.1*    106 105   CO2 29 28 29   BUN 8 6* 4*   CREATININE 0.5 0.5 0.5   CALCIUM 7.6* 7.6* 7.4*       CBC:   Recent Labs   Lab 01/04/19  0936 01/05/19  0749 01/06/19  0529   WBC 14.77* 14.85* 11.71   HGB 9.3*  9.3* 9.0*  9.0* 8.4*  8.4*   HCT 28.3*  28.3* 27.5*  27.5* 24.9*  24.9*   * 338 336       Blood Culture:   Recent Labs   Lab 01/02/19  1230 01/02/19  1245   LABBLOO No Growth to date  No Growth to date  No Growth to date  No Growth to date No Growth to date  No Growth to date  No Growth to date  No Growth to date     Urine Culture:   Recent Labs   Lab 01/02/19  1219   LABURIN No growth     Urine Studies:   Recent Labs   Lab 01/02/19  1219   COLORU Yesi   APPEARANCEUA Clear   PHUR 5.0   SPECGRAV  1.025   PROTEINUA Negative   GLUCUA Negative   KETONESU 1+*   BILIRUBINUA Negative   OCCULTUA Negative   NITRITE Positive*   UROBILINOGEN 4.0-6.0*   LEUKOCYTESUR Negative   RBCUA 1   WBCUA 1   BACTERIA Moderate*   SQUAMEPITHEL 2       Significant Imaging:  All pertinent imaging results/findings from the past 24 hours have been reviewed.

## 2019-01-06 NOTE — ASSESSMENT & PLAN NOTE
- Concern for prostate cancer   - May also be related to UTI/catheter   - His PSA is only 10.8 and down from 13.4, these numbers are not consistent with metastatic prostate cancer unless he has an extremely high grade tumor.     - Consider for prostate biopsy - risks of infection, bleeding, etc discussed with pt's family.  Discussed this again on 1/4, they want to proceed with a biopsy despite the risks of infection.   - Doubt he would tolerate an outpatient/bedside procedure   - Would need to stop Lovenox, start antibiotics, and have enema prior to prostate biopsy. Considering for Wednesday procedure.

## 2019-01-06 NOTE — PROGRESS NOTES
POD#3  Pt c/o abdominal and right hip pain through the night. He has had some loose stools.  VSS - confused    Rt hip- good alignment, inc c/d/i   NVI  Hct 24.9    A/P Rt ST/IT femur fx  1. Cont attempts to mobilize- will be limited due to dementia  2. Ok for transfer from Ortho standpoint- awaiting placement

## 2019-01-06 NOTE — PROGRESS NOTES
[facility] Hospital Medicine  Progress Note    Patient Name: Hugo Lockhart  MRN: [id number]  Patient Class: IP- Inpatient   Admission Date: 1/2/2019  Length of Stay: 4 days  Attending Physician: Mj Esteves MD  Primary Care Provider: [facility]        Subjective:     Principal Problem:Closed displaced intertrochanteric fracture of right femur    HPI:  Mr. Lockhart is a 74 yo M with a history of stroke and dementia, who presents from home per family with a R hip fracture on 1/2/19. The patient is not able to give any history due to dementia. The history was obtained per my discussion with the ED staff as well as review of the ED record.  This patient was just discharged from this hospital on 12/18/18 for suspected urinary retention and possible prostatitis.  No further history/details available about patient's presenting complaint. There was an obvious deformity of the R hip on presentation to the ED. The patient was also found to have a unstageable sacral decub on admission.      Hospital Course:  Mr. Lockhart is a 74 yo M with a history of stroke and dementia, who presents from home per family with a R hip fracture on 1/2/19.  Ortho was consulted. The patient is from home with family. He was also found to have a unstageable sacral decub. The patient's step daughter requested palliative care consult on 1/3/19.  The patient went for IM nailing of R femur on 1/3/19. Oncology was consulted secondary to elevated PSA and lytic rib lesions found on X-ray. They recommended urology consult.  Urology is considering prostate Bx. Oncology ordered bone scan highly suggestive of metastatic disease to bilateral ribs and could not r/o pathologic R hip fracture.      Interval History: No new issues.     Review of Systems   Unable to perform ROS: Dementia     Objective:     Vital Signs (Most Recent):  Temp: 98.7 °F (37.1 °C) (01/06/19 0831)  Pulse: 87 (01/06/19 0831)  Resp: 18 (01/06/19  0831)  BP: 131/64 (01/06/19 0831)  SpO2: 97 % (01/06/19 0831) Vital Signs (24h Range):  Temp:  [96.9 °F (36.1 °C)-99.2 °F (37.3 °C)] 98.7 °F (37.1 °C)  Pulse:  [] 87  Resp:  [17-18] 18  SpO2:  [92 %-97 %] 97 %  BP: (123-140)/(56-79) 131/64     Weight: 49 kg (108 lb 0.4 oz)  Body mass index is 17.44 kg/m².    Intake/Output Summary (Last 24 hours) at 1/6/2019 0932  Last data filed at 1/5/2019 1800  Gross per 24 hour   Intake 0 ml   Output 2 ml   Net -2 ml      Physical Exam   Constitutional: He appears well-developed and well-nourished.   Cardiovascular: Normal rate and regular rhythm.   Pulmonary/Chest: Breath sounds normal.   Neurological: He is alert.   Skin: Skin is warm and dry.   Nursing note and vitals reviewed.      Significant Labs:   BMP:   Recent Labs   Lab 01/06/19  0529   *      K 3.1*      CO2 29   BUN 4*   CREATININE 0.5   CALCIUM 7.4*     CBC:   Recent Labs   Lab 01/04/19  0936 01/05/19  0749 01/06/19  0529   WBC 14.77* 14.85* 11.71   HGB 9.3*  9.3* 9.0*  9.0* 8.4*  8.4*   HCT 28.3*  28.3* 27.5*  27.5* 24.9*  24.9*   * 338 336       Significant Imaging:     Assessment/Plan:      * Closed displaced intertrochanteric fracture of right femur    Not sure of acuity as unable to get history.  Ortho consulted.  Patient is very frail and thin. Will leave up to ortho if appropriate for surgery.  I would expect a possible difficult recovery.  At least for now, no cardio/pulmonary active issues. As of now, patient is supposed to have a nailing done but daughter has many questions for ortho.  S/P IM nailing of R femur on 1/3/19  Not sure of dispo- SNF?        Elevated PSA    As above.        Lytic lesion of bone on x-ray    Bone scan confirms.         Anemia of chronic disease    No acute issues         BPH with urinary obstruction    Recent evaluation 12/18. Sent home on flomax.  Possible prostate CA and the patient was to follow up with urology. Noted lytic bone lesions on  x-ray.  Will check a PSA.  Consider further consultation.  PSA 10.8. Consulted oncology. Palliative care also consulted.  Oncology will proceed with bone scan. 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 Possible pathologic R hip fx.  Biopsy of prostate pending.       History of CVA (cerebrovascular accident)    No acute issues        Dementia with behavioral disturbance    No acute issues.          VTE Risk Mitigation (From admission, onward)        Ordered     enoxaparin injection 40 mg  Daily      01/03/19 1510     Place TANYA hose  Until discontinued      01/03/19 1510     Place sequential compression device  Until discontinued      01/03/19 1510     IP VTE HIGH RISK PATIENT  Once      01/03/19 1510        Palliative care eval on Monday. As of now, plan is for SNF. Follow oncology/urology recs.         Mj Robin MD  Department of Hospital Medicine   [facility]

## 2019-01-06 NOTE — PROGRESS NOTES
[facility]  Urology  Progress Note    Patient Name: Hugo Lockhart  MRN: [id number]  Admission Date: 1/2/2019  Hospital Length of Stay: 4 days  Code Status: Full Code   Attending Provider: Mj Esteves MD   Primary Care Physician: [facility]    Subjective:     HPI:  74yo M with elevated PSA, lytic bone lesions. Dr Vasques saw pt as inpatient consultation 12/18/18 for similar as well as possible UR. Recommended possible prostate biopsy as outpatient. Pt scheduled to see me (Dr. Mccarthy) later this month. He is now admitted for R femur fracture.     PSA 12/18/18 - 13.4, 1/2/19 - 10.8. Concern for prostatitis in 12/18.      History is otherwise limited 2/2 dementia. History provided by chart and pt's daughter.     Interval History: Pt's family reports significant dysuria and pain overnight. Unsure PVRs.     Review of Systems   Unable to perform ROS: Dementia     Objective:     Temp:  [96.9 °F (36.1 °C)-99.2 °F (37.3 °C)] 98.7 °F (37.1 °C)  Pulse:  [] 87  Resp:  [17-18] 18  SpO2:  [92 %-97 %] 97 %  BP: (123-140)/(56-79) 131/64     Body mass index is 17.44 kg/m².            Drains          None          Physical Exam   Vitals reviewed.  Constitutional: He appears well-developed. No distress.   Thin   HENT:   Head: Normocephalic and atraumatic.   Eyes: Right eye exhibits no discharge. Left eye exhibits no discharge. No scleral icterus.   Neck: Normal range of motion. Neck supple.   Cardiovascular: Normal rate and regular rhythm.    Pulmonary/Chest: Effort normal and breath sounds normal. No respiratory distress.   Abdominal: Bowel sounds are normal. He exhibits no distension. There is no tenderness. There is no rebound and no guarding.   Genitourinary:   Genitourinary Comments: Smith removed  Bladder nonpalpable   Skin: Skin is warm and dry. He is not diaphoretic. No erythema.         Significant Labs:    BMP:  Recent Labs   Lab 01/04/19  0644 01/05/19  0749 01/06/19  0529   NA  138 138 138   K 3.7 3.4* 3.1*    106 105   CO2 29 28 29   BUN 8 6* 4*   CREATININE 0.5 0.5 0.5   CALCIUM 7.6* 7.6* 7.4*       CBC:   Recent Labs   Lab 01/04/19  0936 01/05/19  0749 01/06/19  0529   WBC 14.77* 14.85* 11.71   HGB 9.3*  9.3* 9.0*  9.0* 8.4*  8.4*   HCT 28.3*  28.3* 27.5*  27.5* 24.9*  24.9*   * 338 336       Blood Culture:   Recent Labs   Lab 01/02/19  1230 01/02/19  1245   LABBLOO No Growth to date  No Growth to date  No Growth to date  No Growth to date No Growth to date  No Growth to date  No Growth to date  No Growth to date     Urine Culture:   Recent Labs   Lab 01/02/19  1219   LABURIN No growth     Urine Studies:   Recent Labs   Lab 01/02/19  1219   COLORU Yesi   APPEARANCEUA Clear   PHUR 5.0   SPECGRAV 1.025   PROTEINUA Negative   GLUCUA Negative   KETONESU 1+*   BILIRUBINUA Negative   OCCULTUA Negative   NITRITE Positive*   UROBILINOGEN 4.0-6.0*   LEUKOCYTESUR Negative   RBCUA 1   WBCUA 1   BACTERIA Moderate*   SQUAMEPITHEL 2       Significant Imaging:  All pertinent imaging results/findings from the past 24 hours have been reviewed.                  Assessment/Plan:     Elevated PSA     - Concern for prostate cancer   - May also be related to UTI/catheter   - His PSA is only 10.8 and down from 13.4, these numbers are not consistent with metastatic prostate cancer unless he has an extremely high grade tumor.     - Consider for prostate biopsy - risks of infection, bleeding, etc discussed with pt's family.  Discussed this again on 1/4, they want to proceed with a biopsy despite the risks of infection.   - Doubt he would tolerate an outpatient/bedside procedure   - Would need to stop Lovenox, start antibiotics, and have enema prior to prostate biopsy. Considering for Wednesday procedure.          BPH with urinary obstruction     - Smith removed 1/5/19   - Check PVRs to ensure adequate emptying       Pyridium added for dysuria.    VTE Risk Mitigation (From admission,  onward)        Ordered     enoxaparin injection 40 mg  Daily      01/03/19 1510     Place TANYA hose  Until discontinued      01/03/19 1510     Place sequential compression device  Until discontinued      01/03/19 1510     IP VTE HIGH RISK PATIENT  Once      01/03/19 1510          Melissa Mccarthy MD  Urology  [facility]

## 2019-01-07 LAB
ANION GAP SERPL CALC-SCNC: 5 MMOL/L
BACTERIA BLD CULT: NORMAL
BACTERIA BLD CULT: NORMAL
BASOPHILS # BLD AUTO: 0.01 K/UL
BASOPHILS NFR BLD: 0.1 %
BUN SERPL-MCNC: 4 MG/DL
CALCIUM SERPL-MCNC: 7.2 MG/DL
CHLORIDE SERPL-SCNC: 110 MMOL/L
CO2 SERPL-SCNC: 25 MMOL/L
CREAT SERPL-MCNC: 0.5 MG/DL
DIFFERENTIAL METHOD: ABNORMAL
EOSINOPHIL # BLD AUTO: 0.1 K/UL
EOSINOPHIL NFR BLD: 0.9 %
ERYTHROCYTE [DISTWIDTH] IN BLOOD BY AUTOMATED COUNT: 13.9 %
EST. GFR  (AFRICAN AMERICAN): >60 ML/MIN/1.73 M^2
EST. GFR  (NON AFRICAN AMERICAN): >60 ML/MIN/1.73 M^2
GLUCOSE SERPL-MCNC: 113 MG/DL
HCT VFR BLD AUTO: 27.8 %
HCT VFR BLD AUTO: 27.8 %
HGB BLD-MCNC: 8.9 G/DL
HGB BLD-MCNC: 8.9 G/DL
LYMPHOCYTES # BLD AUTO: 1.1 K/UL
LYMPHOCYTES NFR BLD: 10.7 %
MAGNESIUM SERPL-MCNC: 1.5 MG/DL
MAGNESIUM SERPL-MCNC: 1.8 MG/DL
MCH RBC QN AUTO: 29.2 PG
MCHC RBC AUTO-ENTMCNC: 32 G/DL
MCV RBC AUTO: 91 FL
MONOCYTES # BLD AUTO: 0.7 K/UL
MONOCYTES NFR BLD: 7.2 %
NEUTROPHILS # BLD AUTO: 8 K/UL
NEUTROPHILS NFR BLD: 80.6 %
PLATELET # BLD AUTO: 412 K/UL
PMV BLD AUTO: 9.3 FL
POTASSIUM SERPL-SCNC: 3.4 MMOL/L
POTASSIUM SERPL-SCNC: 4.2 MMOL/L
RBC # BLD AUTO: 3.05 M/UL
SODIUM SERPL-SCNC: 140 MMOL/L
WBC # BLD AUTO: 9.87 K/UL

## 2019-01-07 PROCEDURE — 97110 THERAPEUTIC EXERCISES: CPT

## 2019-01-07 PROCEDURE — 80048 BASIC METABOLIC PNL TOTAL CA: CPT

## 2019-01-07 PROCEDURE — 83735 ASSAY OF MAGNESIUM: CPT

## 2019-01-07 PROCEDURE — 85025 COMPLETE CBC W/AUTO DIFF WBC: CPT

## 2019-01-07 PROCEDURE — 25000003 PHARM REV CODE 250: Performed by: ORTHOPAEDIC SURGERY

## 2019-01-07 PROCEDURE — 25000003 PHARM REV CODE 250: Performed by: UROLOGY

## 2019-01-07 PROCEDURE — 97535 SELF CARE MNGMENT TRAINING: CPT

## 2019-01-07 PROCEDURE — 99232 SBSQ HOSP IP/OBS MODERATE 35: CPT | Mod: ,,, | Performed by: UROLOGY

## 2019-01-07 PROCEDURE — 36415 COLL VENOUS BLD VENIPUNCTURE: CPT

## 2019-01-07 PROCEDURE — G8989 SELF CARE D/C STATUS: HCPCS | Mod: CN

## 2019-01-07 PROCEDURE — 97530 THERAPEUTIC ACTIVITIES: CPT

## 2019-01-07 PROCEDURE — 84132 ASSAY OF SERUM POTASSIUM: CPT

## 2019-01-07 PROCEDURE — 99232 PR SUBSEQUENT HOSPITAL CARE,LEVL II: ICD-10-PCS | Mod: ,,, | Performed by: UROLOGY

## 2019-01-07 PROCEDURE — 63600175 PHARM REV CODE 636 W HCPCS: Performed by: EMERGENCY MEDICINE

## 2019-01-07 PROCEDURE — G8988 SELF CARE GOAL STATUS: HCPCS | Mod: CI

## 2019-01-07 PROCEDURE — 11000001 HC ACUTE MED/SURG PRIVATE ROOM

## 2019-01-07 PROCEDURE — 25000003 PHARM REV CODE 250: Performed by: EMERGENCY MEDICINE

## 2019-01-07 PROCEDURE — 94761 N-INVAS EAR/PLS OXIMETRY MLT: CPT

## 2019-01-07 PROCEDURE — 63600175 PHARM REV CODE 636 W HCPCS: Performed by: ORTHOPAEDIC SURGERY

## 2019-01-07 PROCEDURE — G8987 SELF CARE CURRENT STATUS: HCPCS | Mod: CM

## 2019-01-07 RX ADMIN — DOCUSATE SODIUM 100 MG: 100 CAPSULE, LIQUID FILLED ORAL at 08:01

## 2019-01-07 RX ADMIN — PHENAZOPYRIDINE HYDROCHLORIDE 200 MG: 100 TABLET ORAL at 01:01

## 2019-01-07 RX ADMIN — PHENAZOPYRIDINE HYDROCHLORIDE 200 MG: 100 TABLET ORAL at 05:01

## 2019-01-07 RX ADMIN — ENOXAPARIN SODIUM 40 MG: 100 INJECTION SUBCUTANEOUS at 08:01

## 2019-01-07 RX ADMIN — MORPHINE SULFATE 2 MG: 10 INJECTION INTRAVENOUS at 09:01

## 2019-01-07 RX ADMIN — MORPHINE SULFATE 2 MG: 10 INJECTION INTRAVENOUS at 01:01

## 2019-01-07 RX ADMIN — MUPIROCIN 1 G: 20 OINTMENT TOPICAL at 09:01

## 2019-01-07 RX ADMIN — MORPHINE SULFATE 4 MG: 10 INJECTION INTRAVENOUS at 03:01

## 2019-01-07 RX ADMIN — MUPIROCIN 1 G: 20 OINTMENT TOPICAL at 08:01

## 2019-01-07 RX ADMIN — MORPHINE SULFATE 2 MG: 10 INJECTION INTRAVENOUS at 05:01

## 2019-01-07 RX ADMIN — PHENAZOPYRIDINE HYDROCHLORIDE 200 MG: 100 TABLET ORAL at 08:01

## 2019-01-07 RX ADMIN — POLYETHYLENE GLYCOL 3350 17 G: 17 POWDER, FOR SOLUTION ORAL at 08:01

## 2019-01-07 RX ADMIN — ATORVASTATIN CALCIUM 40 MG: 40 TABLET, FILM COATED ORAL at 08:01

## 2019-01-07 NOTE — PLAN OF CARE
Problem: Adult Inpatient Plan of Care  Goal: Plan of Care Review  Outcome: Ongoing (interventions implemented as appropriate)   01/07/19 1793   Plan of Care Review   Plan of Care Reviewed With patient   Patient remains free from fall or injury. IVF continued as ordered. [place]  boots in place to elevate heels. Daughter remains at bedside assisting with care. Bed in low locked position, bed alarm armed and audible with call bell in reach. Will continue to monitor.

## 2019-01-07 NOTE — PROGRESS NOTES
[facility] Hospital Medicine  Progress Note    Patient Name: Hugo Lockhart  MRN: [id number]  Patient Class: IP- Inpatient   Admission Date: 1/2/2019  Length of Stay: 5 days  Attending Physician: Mj Esteves MD  Primary Care Provider: [facility]        Subjective:     Principal Problem:Closed displaced intertrochanteric fracture of right femur    HPI:  Mr. Lockhart is a 74 yo M with a history of stroke and dementia, who presents from home per family with a R hip fracture on 1/2/19. The patient is not able to give any history due to dementia. The history was obtained per my discussion with the ED staff as well as review of the ED record.  This patient was just discharged from this hospital on 12/18/18 for suspected urinary retention and possible prostatitis.  No further history/details available about patient's presenting complaint. There was an obvious deformity of the R hip on presentation to the ED. The patient was also found to have a unstageable sacral decub on admission.      Hospital Course:  Mr. Lockhart is a 74 yo M with a history of stroke and dementia, who presents from home per family with a R hip fracture on 1/2/19.  Ortho was consulted. The patient is from home with family. He was also found to have a unstageable sacral decub. The patient's step daughter requested palliative care consult on 1/3/19.  The patient went for IM nailing of R femur on 1/3/19. Oncology was consulted secondary to elevated PSA and lytic rib lesions found on X-ray. They recommended urology consult.  Urology is considering prostate Bx. Oncology ordered bone scan highly suggestive of metastatic disease to bilateral ribs and could not r/o pathologic R hip fracture.      Interval History: No new issues     Review of Systems   Unable to perform ROS: Dementia     Objective:     Vital Signs (Most Recent):  Temp: 97.6 °F (36.4 °C) (01/07/19 0729)  Pulse: 80 (01/07/19 0729)  Resp: 18 (01/07/19  0729)  BP: 133/77 (01/07/19 0729)  SpO2: 98 % (01/07/19 0729) Vital Signs (24h Range):  Temp:  [97.1 °F (36.2 °C)-99 °F (37.2 °C)] 97.6 °F (36.4 °C)  Pulse:  [] 80  Resp:  [18] 18  SpO2:  [95 %-100 %] 98 %  BP: (107-133)/(58-90) 133/77     Weight: 49 kg (108 lb 0.4 oz)  Body mass index is 17.44 kg/m².    Intake/Output Summary (Last 24 hours) at 1/7/2019 0852  Last data filed at 1/6/2019 1905  Gross per 24 hour   Intake --   Output 37 ml   Net -37 ml      Physical Exam   Constitutional: He appears well-developed and well-nourished.   Cardiovascular: Normal rate and regular rhythm.   Pulmonary/Chest: Breath sounds normal.   Neurological: He is alert.   Skin: Skin is warm and dry.   Nursing note and vitals reviewed.      Significant Labs:   CBC:   Recent Labs   Lab 01/06/19  0529   WBC 11.71   HGB 8.4*  8.4*   HCT 24.9*  24.9*        CMP:   Recent Labs   Lab 01/06/19  0529      K 3.1*      CO2 29   *   BUN 4*   CREATININE 0.5   CALCIUM 7.4*   ANIONGAP 4*   EGFRNONAA >60       Significant Imaging:     Assessment/Plan:      * Closed displaced intertrochanteric fracture of right femur    Not sure of acuity as unable to get history.  Ortho consulted.  Patient is very frail and thin. Will leave up to ortho if appropriate for surgery.  I would expect a possible difficult recovery.  At least for now, no cardio/pulmonary active issues. As of now, patient is supposed to have a nailing done but daughter has many questions for ortho.  S/P IM nailing of R femur on 1/3/19  Not sure of dispo- SNF?        Elevated PSA    As above.        Lytic lesion of bone on x-ray    Bone scan confirms.         Anemia of chronic disease    No acute issues         BPH with urinary obstruction    Recent evaluation 12/18. Sent home on flomax.  Possible prostate CA and the patient was to follow up with urology. Noted lytic bone lesions on x-ray.  Will check a PSA.  Consider further consultation.  PSA 10.8. Consulted  oncology. Palliative care also consulted.  Oncology will proceed with bone scan. 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 Possible pathologic R hip fx.  Biopsy of prostate pending.       History of CVA (cerebrovascular accident)    No acute issues        Dementia with behavioral disturbance    No acute issues.          VTE Risk Mitigation (From admission, onward)        Ordered     enoxaparin injection 40 mg  Daily      01/03/19 1510     Place TANYA hose  Until discontinued      01/03/19 1510     Place sequential compression device  Until discontinued      01/03/19 1510     IP VTE HIGH RISK PATIENT  Once      01/03/19 1510        Dispo not clear.  PT/OT rec: SNF- not sure if he would be candidate. Palliative care will evaluate today.       Mj Robin MD  Department of Hospital Medicine   [facility]

## 2019-01-07 NOTE — SUBJECTIVE & OBJECTIVE
Interval History: No new issues     Review of Systems   Unable to perform ROS: Dementia     Objective:     Vital Signs (Most Recent):  Temp: 97.6 °F (36.4 °C) (01/07/19 0729)  Pulse: 80 (01/07/19 0729)  Resp: 18 (01/07/19 0729)  BP: 133/77 (01/07/19 0729)  SpO2: 98 % (01/07/19 0729) Vital Signs (24h Range):  Temp:  [97.1 °F (36.2 °C)-99 °F (37.2 °C)] 97.6 °F (36.4 °C)  Pulse:  [] 80  Resp:  [18] 18  SpO2:  [95 %-100 %] 98 %  BP: (107-133)/(58-90) 133/77     Weight: 49 kg (108 lb 0.4 oz)  Body mass index is 17.44 kg/m².    Intake/Output Summary (Last 24 hours) at 1/7/2019 0852  Last data filed at 1/6/2019 1905  Gross per 24 hour   Intake --   Output 37 ml   Net -37 ml      Physical Exam   Constitutional: He appears well-developed and well-nourished.   Cardiovascular: Normal rate and regular rhythm.   Pulmonary/Chest: Breath sounds normal.   Neurological: He is alert.   Skin: Skin is warm and dry.   Nursing note and vitals reviewed.      Significant Labs:   CBC:   Recent Labs   Lab 01/06/19 0529   WBC 11.71   HGB 8.4*  8.4*   HCT 24.9*  24.9*        CMP:   Recent Labs   Lab 01/06/19 0529      K 3.1*      CO2 29   *   BUN 4*   CREATININE 0.5   CALCIUM 7.4*   ANIONGAP 4*   EGFRNONAA >60       Significant Imaging:

## 2019-01-07 NOTE — PLAN OF CARE
Problem: Physical Therapy Goal  Goal: Physical Therapy Goal  Goals to be met by: 2019     Patient will increase functional independence with mobility by performin. Supine to sit with Modified [place]  2. Sit to supine with Modified [place]  3. Sit to stand transfer with Modified [place]  4. Gait  x 200 feet with Modified [place] using Rolling Walker.    Recommend: SNF at time of discharge.         Outcome: Ongoing (interventions implemented as appropriate)   Pt with increased complaints of pain, however, tolerated sitting EOB ~20 minutes with Min A.  Pt unable to perform standing and tolerated supine PROM exercises. Pt unable to follow commands due to decreased cognition.

## 2019-01-07 NOTE — PLAN OF CARE
Problem: Occupational Therapy Goal  Goal: Occupational Therapy Goal  Goals to be met by: 1/19/19    Patient will increase functional independence with ADLs by performing:    Feeding with Yrhbl-[id number]-Caaipswqtd and verbal cues.  UE Dressing with Stand-by Assistance and verbal cues.  Grooming while seated with Stand-by Assistance and verbal cues.  Sitting at edge of bed 15 minutes with Supervision to perfom ADLs with verbal cues.  Rolling right and left with moderate assist  Pt will participate in AAROM exercises with moderate assist 3 set of 7 to increase strength for functional mobility       Outcome: Ongoing (interventions implemented as appropriate)  Pt will participate with therapy with follow up services at next level of care.

## 2019-01-07 NOTE — SUBJECTIVE & OBJECTIVE
Interval History: PVRs low. Daughter reports more comfortable overnight. She reports epigastric fullness.     Review of Systems   Unable to perform ROS: Dementia     Objective:     Temp:  [97.1 °F (36.2 °C)-99 °F (37.2 °C)] 97.6 °F (36.4 °C)  Pulse:  [] 80  Resp:  [18] 18  SpO2:  [95 %-100 %] 97 %  BP: (107-133)/(58-90) 133/77     Body mass index is 17.44 kg/m².    Date 01/07/19 0700 - 01/08/19 0659   Shift 6630-5466 9721-6329 4604-5691 24 Hour Total   INTAKE   P.O. 120   120   Shift Total(mL/kg) 120(2.4)   120(2.4)   OUTPUT   Shift Total(mL/kg)       Weight (kg) 49 49 49 49     Post Void Cath Residual (mL): 36 mL (01/06/19 1905)    Drains          None          Physical Exam   Vitals reviewed.  Constitutional: He appears well-developed. No distress.   Thin   HENT:   Head: Normocephalic and atraumatic.   Eyes: Right eye exhibits no discharge. Left eye exhibits no discharge. No scleral icterus.   Neck: Normal range of motion. Neck supple.   Cardiovascular: Normal rate and regular rhythm.    Pulmonary/Chest: Effort normal and breath sounds normal. No respiratory distress.   Abdominal: Bowel sounds are normal. He exhibits no distension. There is no tenderness. There is no rebound and no guarding.   Genitourinary:   Genitourinary Comments: Smith removed  Bladder nonpalpable   Skin: Skin is warm and dry. He is not diaphoretic. No erythema.         Significant Labs:    BMP:  Recent Labs   Lab 01/05/19  0749 01/06/19  0529 01/07/19  0748    138 140   K 3.4* 3.1* 4.2    105 110   CO2 28 29 25   BUN 6* 4* 4*   CREATININE 0.5 0.5 0.5   CALCIUM 7.6* 7.4* 7.2*       CBC:   Recent Labs   Lab 01/04/19  0936 01/05/19  0749 01/06/19  0529   WBC 14.77* 14.85* 11.71   HGB 9.3*  9.3* 9.0*  9.0* 8.4*  8.4*   HCT 28.3*  28.3* 27.5*  27.5* 24.9*  24.9*   * 338 336       Blood Culture:   Recent Labs   Lab 01/02/19  1230 01/02/19  1245   LABBLOO No Growth to date  No Growth to date  No Growth to date  No  Growth to date  No Growth to date No Growth to date  No Growth to date  No Growth to date  No Growth to date  No Growth to date     Urine Culture:   Recent Labs   Lab 01/02/19  1219   LABURIN No growth     Urine Studies:   Recent Labs   Lab 01/02/19  1219   COLORU Yesi   APPEARANCEUA Clear   PHUR 5.0   SPECGRAV 1.025   PROTEINUA Negative   GLUCUA Negative   KETONESU 1+*   BILIRUBINUA Negative   OCCULTUA Negative   NITRITE Positive*   UROBILINOGEN 4.0-6.0*   LEUKOCYTESUR Negative   RBCUA 1   WBCUA 1   BACTERIA Moderate*   SQUAMEPITHEL 2       Significant Imaging:  All pertinent imaging results/findings from the past 24 hours have been reviewed.

## 2019-01-07 NOTE — PT/OT/SLP PROGRESS
Occupational Therapy   Treatment    Name: Hugo Lockhart  MRN: [id number]  Admitting Diagnosis:  Closed displaced intertrochanteric fracture of right femur  4 Days Post-Op    Recommendations:     Discharge Recommendations: nursing facility, skilled  Discharge Equipment Recommendations:  none  Barriers to discharge:       Subjective     Pain/Comfort:  · Pain Rating 1: (yes, unable to rate)  · Location - Side 1: Right  · Location 1: hip  · Pain Addressed 1: Cessation of Activity, Pre-medicate for activity    Objective:     Communicated with: Nurse Beal prior to session.  Patient found with all lines intact and peripheral IV, pressure relief boots, telemetry upon OT entry to room.    General Precautions: Standard, fall   Orthopedic Precautions:RLE weight bearing as tolerated   Braces: N/A     Occupational Performance:    Bed Mobility:    · Patient completed Rolling/Turning to Left with  dependent  · Patient completed Rolling/Turning to Right with dependent  · Patient completed Scooting/Bridging with dependent  · Patient completed Supine to Sit with dependent  · Patient completed Sit to Supine with dependent     Activities of Daily Living:  · Feeding:  maximal assistance and total assistance to drink from cup  · Upper Body Dressing: dependence    · Lower Body Dressing: dependence    · Toileting: dependence for pericare and to don/doff brief    [facility] 6 Click ADL: 7    Treatment & Education:  Bed mobility and ADL's as above. Patient requires increased time to perform all functional mobility.  Patient tolerated sitting EOB ~20 mins with Min A for balance.  Patient's educated on BUE ROM therex and provided with handout. Patient unable to perform at this time due to agitation and increased pain.    Patient left HOB elevated, pressure relief boots placed, with all lines intact, call button in reach, bed alarm on, nurse notified and daughter present  Education:    Assessment:     Hugo Lockhart is a 73 y.o. male with  a medical diagnosis of Closed displaced intertrochanteric fracture of right femur.  He presents with the following performance deficits affecting function are weakness, impaired endurance, impaired self care skills, impaired functional mobilty, impaired balance, decreased coordination, decreased upper extremity function, decreased lower extremity function, impaired cognition, decreased safety awareness, pain, decreased ROM, impaired coordination, impaired fine motor, edema, orthopedic precautions, impaired skin. Patient able to tolerate sitting EOB today with Min A to maintain balance. Patient with increased pain to RLE and becomes anxious and fearful. Patient requires constant reassurance and encouragement for comfort. Patient unable to follow commands due to cognitive deficits, daughter at bedside and very supportive in patient care.     Rehab Prognosis:  Poor; patient would benefit from acute skilled OT services to address these deficits and reach maximum level of function.       Plan:     Patient to be seen 5 x/week to address the above listed problems via self-care/home management, therapeutic activities, therapeutic exercises, neuromuscular re-education  · Plan of Care Expires:    · Plan of Care Reviewed with: patient, daughter, son    This Plan of care has been discussed with the patient who was involved in its development and understands and is in agreement with the identified goals and treatment plan    GOALS:   Multidisciplinary Problems     Occupational Therapy Goals        Problem: Occupational Therapy Goal    Goal Priority Disciplines Outcome Interventions   Occupational Therapy Goal     OT, PT/OT Ongoing (interventions implemented as appropriate)    Description:  Goals to be met by: 1/19/19    Patient will increase functional independence with ADLs by performing:    Feeding with Ijhks-mo-Jfryhowxzt and verbal cues.  UE Dressing with Stand-by Assistance and verbal cues.  Grooming while seated with  Stand-by Assistance and verbal cues.  Sitting at edge of bed 15 minutes with Supervision to perfom ADLs with verbal cues.  Rolling right and left with moderate assist  Pt will participate in AAROM exercises with moderate assist 3 set of 7 to increase strength for functional mobility                         Time Tracking:     OT Date of Treatment: 01/07/19  OT Start Time: 1428  OT Stop Time: 1506  OT Total Time (min): 38 min    Billable Minutes:Self Care/Home Management 12  Therapeutic Activity 26    TRICE Larsen  1/7/2019

## 2019-01-07 NOTE — PLAN OF CARE
Problem: Adult Inpatient Plan of Care  Goal: Plan of Care Review  Outcome: Ongoing (interventions implemented as appropriate)  Pt turn Q2, IVF cont and IV abx per order, bilat sundance boots to elevated heels, Ortho and Hem/Onc following, dtr at bedside assisting w/care, safety maintained, bed low locked and in position, will cont plan of care

## 2019-01-07 NOTE — PROGRESS NOTES
[facility]  Urology  Progress Note    Patient Name: Hugo Lockhart  MRN: [id number]  Admission Date: 1/2/2019  Hospital Length of Stay: 5 days  Code Status: Full Code   Attending Provider: Mj Esteves MD   Primary Care Physician: [facility]    Subjective:     HPI:  74yo M with elevated PSA, lytic bone lesions. Dr Vasques saw pt as inpatient consultation 12/18/18 for similar as well as possible UR. Recommended possible prostate biopsy as outpatient. Pt scheduled to see me (Dr. Mccarthy) later this month. He is now admitted for R femur fracture.     PSA 12/18/18 - 13.4, 1/2/19 - 10.8. Concern for prostatitis in 12/18.      History is otherwise limited 2/2 dementia. History provided by chart and pt's daughter.     Interval History: PVRs low. Daughter reports more comfortable overnight. She reports epigastric fullness.     Review of Systems   Unable to perform ROS: Dementia     Objective:     Temp:  [97.1 °F (36.2 °C)-99 °F (37.2 °C)] 97.6 °F (36.4 °C)  Pulse:  [] 80  Resp:  [18] 18  SpO2:  [95 %-100 %] 97 %  BP: (107-133)/(58-90) 133/77     Body mass index is 17.44 kg/m².    Date 01/07/19 0700 - 01/08/19 0659   Shift 8015-5409 8967-7243 3383-8795 24 Hour Total   INTAKE   P.O. 120   120   Shift Total(mL/kg) 120(2.4)   120(2.4)   OUTPUT   Shift Total(mL/kg)       Weight (kg) 49 49 49 49     Post Void Cath Residual (mL): 36 mL (01/06/19 1905)    Drains          None          Physical Exam   Vitals reviewed.  Constitutional: He appears well-developed. No distress.   Thin   HENT:   Head: Normocephalic and atraumatic.   Eyes: Right eye exhibits no discharge. Left eye exhibits no discharge. No scleral icterus.   Neck: Normal range of motion. Neck supple.   Cardiovascular: Normal rate and regular rhythm.    Pulmonary/Chest: Effort normal and breath sounds normal. No respiratory distress.   Abdominal: Bowel sounds are normal. He exhibits no distension. There is no tenderness. There is  no rebound and no guarding.   Genitourinary:   Genitourinary Comments: Smith removed  Bladder nonpalpable   Skin: Skin is warm and dry. He is not diaphoretic. No erythema.         Significant Labs:    BMP:  Recent Labs   Lab 01/05/19  0749 01/06/19  0529 01/07/19  0748    138 140   K 3.4* 3.1* 4.2    105 110   CO2 28 29 25   BUN 6* 4* 4*   CREATININE 0.5 0.5 0.5   CALCIUM 7.6* 7.4* 7.2*       CBC:   Recent Labs   Lab 01/04/19  0936 01/05/19  0749 01/06/19  0529   WBC 14.77* 14.85* 11.71   HGB 9.3*  9.3* 9.0*  9.0* 8.4*  8.4*   HCT 28.3*  28.3* 27.5*  27.5* 24.9*  24.9*   * 338 336       Blood Culture:   Recent Labs   Lab 01/02/19  1230 01/02/19  1245   LABBLOO No Growth to date  No Growth to date  No Growth to date  No Growth to date  No Growth to date No Growth to date  No Growth to date  No Growth to date  No Growth to date  No Growth to date     Urine Culture:   Recent Labs   Lab 01/02/19  1219   LABURIN No growth     Urine Studies:   Recent Labs   Lab 01/02/19  1219   COLORU Yesi   APPEARANCEUA Clear   PHUR 5.0   SPECGRAV 1.025   PROTEINUA Negative   GLUCUA Negative   KETONESU 1+*   BILIRUBINUA Negative   OCCULTUA Negative   NITRITE Positive*   UROBILINOGEN 4.0-6.0*   LEUKOCYTESUR Negative   RBCUA 1   WBCUA 1   BACTERIA Moderate*   SQUAMEPITHEL 2       Significant Imaging:  All pertinent imaging results/findings from the past 24 hours have been reviewed.                  Assessment/Plan:     Elevated PSA     - Concern for prostate cancer   - May also be related to UTI/catheter   - His PSA is only 10.8 and down from 13.4, these numbers are not consistent with metastatic prostate cancer unless he has an extremely high grade tumor.     - Consider for prostate biopsy - risks of infection, bleeding, etc discussed with pt's family.  Discussed this again on 1/4, they want to proceed with a biopsy despite the risks of infection.   - Doubt he would tolerate an outpatient/bedside  procedure   - Would need to stop Lovenox, start antibiotics, and have enema prior to prostate biopsy. Considering for Wednesday procedure.          BPH with urinary obstruction     - Smith removed 1/5/19   - PVR low         VTE Risk Mitigation (From admission, onward)        Ordered     enoxaparin injection 40 mg  Daily      01/03/19 1510     Place TANYA hose  Until discontinued      01/03/19 1510     Place sequential compression device  Until discontinued      01/03/19 1510     IP VTE HIGH RISK PATIENT  Once      01/03/19 1510          Melissa Mccarthy MD  Urology  [facility] [place]

## 2019-01-07 NOTE — ANESTHESIA POSTPROCEDURE EVALUATION
"Anesthesia Post Evaluation    Patient: Hugo Lockhart    Procedure(s) Performed: Procedure(s) (LRB):  INSERTION, INTRAMEDULLARY ZAKIYA, FEMUR (Right)    Final Anesthesia Type: general  Patient location during evaluation: PACU  Patient participation: Yes- Able to Participate  Level of consciousness: awake and alert, oriented and awake  Post-procedure vital signs: reviewed and stable  Airway patency: patent  PONV status at discharge: No PONV  Anesthetic complications: no      Cardiovascular status: blood pressure returned to baseline  Respiratory status: unassisted, spontaneous ventilation and room air  Hydration status: euvolemic  Follow-up not needed.        Visit Vitals  /77 (BP Location: Right arm, Patient Position: Lying)   Pulse 80   Temp 36.4 °C (97.6 °F) (Axillary)   Resp 18   Ht 5' 6" (1.676 m)   Wt 49 kg (108 lb 0.4 oz)   SpO2 98%   BMI 17.44 kg/m²       Pain/Iggy Score: Pain Rating Prior to Med Admin: 6 (1/7/2019  3:09 AM)  Pain Rating Post Med Admin: 0 (1/7/2019  3:39 AM)        " Delivery Discharge Summary  Obstetrics      Primary OB Clinician: Kristen Neumann MD      Admission date: 10/29/2018  Discharge date: 2018    Disposition: To home, self care    Discharge Diagnosis List:      Patient Active Problem List   Diagnosis    History of myomectomy    S/P     Leakage of amniotic fluid    Comfort measures only status       Procedure: [facility]    Hospital Course:  Maria Eugenia Briscoe is a 37 y.o. now , POD #4 who was admitted on 10/29/2018 at 36w2d for PPROM. Patient was subsequently admitted to labor and delivery unit with signed consents.     Due to history of  section x1, decision was made to proceed with delivery via  which was performed without complications.    Please see delivery note for further details. Her postpartum course was uncomplicated. On discharge day, patient's pain is controlled with oral pain medications. Pt is tolerating ambulation without SOB or CP, and regular diet without N/V. Reports lochia is mild. Denies any HA, vision changes, F/C, LE swelling. Denies any breast pain/soreness.    Pt in stable condition and ready for discharge. She has been instructed to start and/or continue medications and follow up with her obstetrics provider as listed below.    Pertinent studies:  Postpartum CBC  Lab Results   Component Value Date    WBC 11.65 10/30/2018    HGB 10.4 (L) 10/30/2018    HCT 30.8 (L) 10/30/2018    MCV 97 10/30/2018     10/30/2018       Immunization History   Administered Date(s) Administered    Influenza - Quadrivalent - PF 10/18/2018    Tdap 2015, 2018        Delivery:    Episiotomy:     Lacerations:     Repair suture:     Repair # of packets:     Blood loss (ml): 0     Birth information:  YOB: 2018   Time of birth: 11:44 AM   Sex: male   Delivery type: , Low Transverse   Gestational Age: 36w2d    Delivery Clinician:      Other providers:       Additional   information:  Forceps:    Vacuum:    Breech:    Observed anomalies      Living?:           APGARS  One minute Five minutes Ten minutes   Skin color:         Heart rate:         Grimace:         Muscle tone:         Breathing:         Totals: 9  9        Placenta: Delivered:       appearance      Patient Instructions:   Current Discharge Medication List      START taking these medications    Details   ibuprofen (ADVIL,MOTRIN) 600 MG tablet Take 1 tablet (600 mg total) by mouth every 6 (six) hours.  Qty: 30 tablet, Refills: 1      oxyCODONE-acetaminophen (PERCOCET) 5-325 mg per tablet Take 1 tablet by mouth every 4 (four) hours as needed.  Qty: 15 tablet, Refills: 0         CONTINUE these medications which have NOT CHANGED    Details   PNV72-iron carb,glu-FA-dss-dha (CITRANATAL 90 DHA, ALGAL OIL,) 90 mg iron-1 mg -50 mg-300 mg Cmpk Take 1 tablet by mouth once daily.  Qty: 30 each, Refills: 11    Associated Diagnoses: Amenorrhea; Positive pregnancy test             Discharge Procedure Orders   Diet Adult Regular     Notify your health care provider if you experience any of the following:   Order Comments: Heavy vaginal bleeding saturating more than 1 pad per hr for at least consecutive 2 hrs.     Notify your health care provider if you experience any of the following:  increased confusion or weakness     Notify your health care provider if you experience any of the following:  persistent dizziness, light-headedness, or visual disturbances     Notify your health care provider if you experience any of the following:  severe persistent headache     Notify your health care provider if you experience any of the following:  difficulty breathing or increased cough     Notify your health care provider if you experience any of the following:  redness, tenderness, or signs of infection (pain, swelling, redness, odor or green/yellow discharge around incision site)     Notify your health care provider if you experience any of the  following:  severe uncontrolled pain     Notify your health care provider if you experience any of the following:  persistent nausea and vomiting or diarrhea     Notify your health care provider if you experience any of the following:  temperature >100.4     Activity as tolerated   Order Comments: Pelvic rest until follow up visit. Nothing in vagina -no sex, tampons, douching, etc.       Follow-up Information     Kristen Neumann MD. Schedule an appointment as soon as possible for a visit in 6 weeks.    Specialties:  Obstetrics, Obstetrics and Gynecology  Why:  Postpartum appointment  Contact information:  [address] [address]  [phone]                  Catrina Hudson MD  OBGYN, PGY-1

## 2019-01-07 NOTE — PT/OT/SLP PROGRESS
Physical Therapy Treatment    Patient Name:  Hugo Lockhart   MRN:  [id number]    Recommendations:     Discharge Recommendations:  nursing facility, skilled   Discharge Equipment Recommendations: none   Barriers to discharge: decreased mobility and cognition    Assessment:     Hugo Lockhart is a 73 y.o. male admitted with a medical diagnosis of Closed displaced intertrochanteric fracture of right femur.  He presents with the following impairments/functional limitations:  weakness, impaired endurance, gait instability, impaired functional mobilty, decreased safety awareness, impaired cognition, impaired coordination, decreased coordination, pain, impaired balance, impaired self care skills, decreased lower extremity function, decreased ROM, edema, orthopedic precautions, impaired skin, decreased upper extremity function.  Pt with increased complaints of pain, however, tolerated sitting EOB ~20 minutes with Min A.  Pt unable to perform standing and tolerated supine PROM exercises. Pt unable to follow commands due to decreased cognition.      Rehab Prognosis: Fair; patient would benefit from acute skilled PT services to address these deficits and reach maximum level of function.    Recent Surgery: Procedure(s) (LRB):  INSERTION, INTRAMEDULLARY ZAKIYA, FEMUR (Right) 4 Days Post-Op    Plan:     During this hospitalization, patient to be seen daily(per Ashley, PT) to address the identified rehab impairments via gait training, therapeutic activities, therapeutic exercises and progress toward the following goals:    · Plan of Care Expires:  01/18/19    Subjective     Chief Complaint: in pain  Patient/Family Comments/goals: Pt moaning with complaints of pain.  Pain/Comfort:  · Pain Rating 1: (yes, unable to rate)      Objective:     Communicated with pt's nurse, Vanessa, prior to session.  Patient found HOB elevated peripheral IV, pressure relief boots, telemetry  upon PT entry to room.     General Precautions: Standard,  fall   Orthopedic Precautions:RLE weight bearing as tolerated   Braces:       Functional Mobility:pt requires extra time to perform due to pt resisting to sit up.  · Bed Mobility:     · Rolling Left:  dependence  · Rolling Right: dependence  · Scooting: dependence  · Supine to Sit: dependence  · Sit to Supine: dependence  · Balance: fair- requiring Min A for support.      AM-PAC 6 CLICK MOBILITY  Turning over in bed (including adjusting bedclothes, sheets and blankets)?: 2  Sitting down on and standing up from a chair with arms (e.g., wheelchair, bedside commode, etc.): 1  Moving from lying on back to sitting on the side of the bed?: 2  Moving to and from a bed to a chair (including a wheelchair)?: 1  Need to walk in hospital room?: 1  Climbing 3-5 steps with a railing?: 1  Basic Mobility Total Score: 8       Therapeutic Activities and Exercises:   PROM performed to RLE in all available planes.  Rolling to R and L with dependent assist for pericare and doffing/donning of brief.    Patient left HOB elevated and B pressure boots with all lines intact, call button in reach, bed alarm on, pt's nurse, Vanessa, notified and daughter present..    GOALS:   Multidisciplinary Problems     Physical Therapy Goals        Problem: Physical Therapy Goal    Goal Priority Disciplines Outcome Goal Variances Interventions   Physical Therapy Goal     PT, PT/OT Ongoing (interventions implemented as appropriate)     Description:  Goals to be met by: 2019     Patient will increase functional independence with mobility by performin. Supine to sit with Modified Bath  2. Sit to supine with Modified Bath  3. Sit to stand transfer with Modified Bath  4. Gait  x 200 feet with Modified Bath using Rolling Walker.    Recommend: SNF at time of discharge.                          Time Tracking:     PT Received On: 19  PT Start Time: 1052     PT Stop Time: 1130  PT Total Time (min): 38 min      Billable Minutes: Therapeutic Activity 28 and Therapeutic Exercise 10    Treatment Type: Treatment  PT/PTA: PTA     PTA Visit Number: 2     Corine Mensah, PTA  01/07/2019

## 2019-01-08 ENCOUNTER — ANESTHESIA EVENT (OUTPATIENT)
Dept: SURGERY | Facility: HOSPITAL | Age: 74
DRG: 480 | End: 2019-01-08
Payer: MEDICARE

## 2019-01-08 LAB
ANION GAP SERPL CALC-SCNC: 7 MMOL/L
BASOPHILS # BLD AUTO: 0.01 K/UL
BASOPHILS NFR BLD: 0.1 %
BUN SERPL-MCNC: 3 MG/DL
CALCIUM SERPL-MCNC: 7.3 MG/DL
CHLORIDE SERPL-SCNC: 104 MMOL/L
CO2 SERPL-SCNC: 27 MMOL/L
CREAT SERPL-MCNC: 0.5 MG/DL
DIFFERENTIAL METHOD: ABNORMAL
EOSINOPHIL # BLD AUTO: 0.1 K/UL
EOSINOPHIL NFR BLD: 1.1 %
ERYTHROCYTE [DISTWIDTH] IN BLOOD BY AUTOMATED COUNT: 14.1 %
EST. GFR  (AFRICAN AMERICAN): >60 ML/MIN/1.73 M^2
EST. GFR  (NON AFRICAN AMERICAN): >60 ML/MIN/1.73 M^2
GLUCOSE SERPL-MCNC: 88 MG/DL
HCT VFR BLD AUTO: 24.6 %
HGB BLD-MCNC: 8.3 G/DL
LYMPHOCYTES # BLD AUTO: 1.4 K/UL
LYMPHOCYTES NFR BLD: 14.1 %
MAGNESIUM SERPL-MCNC: 1.5 MG/DL
MAGNESIUM SERPL-MCNC: 1.5 MG/DL
MCH RBC QN AUTO: 30.5 PG
MCHC RBC AUTO-ENTMCNC: 33.7 G/DL
MCV RBC AUTO: 90 FL
MONOCYTES # BLD AUTO: 0.9 K/UL
MONOCYTES NFR BLD: 9.5 %
NEUTROPHILS # BLD AUTO: 7.3 K/UL
NEUTROPHILS NFR BLD: 75.2 %
PLATELET # BLD AUTO: 373 K/UL
PMV BLD AUTO: 9.3 FL
POTASSIUM SERPL-SCNC: 3.7 MMOL/L
RBC # BLD AUTO: 2.72 M/UL
SODIUM SERPL-SCNC: 138 MMOL/L
WBC # BLD AUTO: 9.73 K/UL

## 2019-01-08 PROCEDURE — 11000001 HC ACUTE MED/SURG PRIVATE ROOM

## 2019-01-08 PROCEDURE — 25000003 PHARM REV CODE 250: Performed by: UROLOGY

## 2019-01-08 PROCEDURE — 99232 PR SUBSEQUENT HOSPITAL CARE,LEVL II: ICD-10-PCS | Mod: ,,, | Performed by: UROLOGY

## 2019-01-08 PROCEDURE — 25000003 PHARM REV CODE 250: Performed by: ORTHOPAEDIC SURGERY

## 2019-01-08 PROCEDURE — 36415 COLL VENOUS BLD VENIPUNCTURE: CPT

## 2019-01-08 PROCEDURE — 83735 ASSAY OF MAGNESIUM: CPT

## 2019-01-08 PROCEDURE — 63600175 PHARM REV CODE 636 W HCPCS: Performed by: EMERGENCY MEDICINE

## 2019-01-08 PROCEDURE — 63600175 PHARM REV CODE 636 W HCPCS: Performed by: ORTHOPAEDIC SURGERY

## 2019-01-08 PROCEDURE — 97110 THERAPEUTIC EXERCISES: CPT

## 2019-01-08 PROCEDURE — 99232 SBSQ HOSP IP/OBS MODERATE 35: CPT | Mod: ,,, | Performed by: UROLOGY

## 2019-01-08 PROCEDURE — 63600175 PHARM REV CODE 636 W HCPCS: Performed by: INTERNAL MEDICINE

## 2019-01-08 PROCEDURE — 85025 COMPLETE CBC W/AUTO DIFF WBC: CPT

## 2019-01-08 PROCEDURE — 25000003 PHARM REV CODE 250: Performed by: EMERGENCY MEDICINE

## 2019-01-08 PROCEDURE — 80048 BASIC METABOLIC PNL TOTAL CA: CPT

## 2019-01-08 RX ORDER — CIPROFLOXACIN 2 MG/ML
400 INJECTION, SOLUTION INTRAVENOUS
Status: DISCONTINUED | OUTPATIENT
Start: 2019-01-08 | End: 2019-01-11 | Stop reason: HOSPADM

## 2019-01-08 RX ORDER — MAGNESIUM SULFATE HEPTAHYDRATE 40 MG/ML
2 INJECTION, SOLUTION INTRAVENOUS ONCE
Status: COMPLETED | OUTPATIENT
Start: 2019-01-08 | End: 2019-01-08

## 2019-01-08 RX ADMIN — ATORVASTATIN CALCIUM 40 MG: 40 TABLET, FILM COATED ORAL at 11:01

## 2019-01-08 RX ADMIN — MORPHINE SULFATE 2 MG: 10 INJECTION INTRAVENOUS at 01:01

## 2019-01-08 RX ADMIN — DOCUSATE SODIUM 100 MG: 100 CAPSULE, LIQUID FILLED ORAL at 11:01

## 2019-01-08 RX ADMIN — MAGNESIUM SULFATE HEPTAHYDRATE 2 G: 40 INJECTION, SOLUTION INTRAVENOUS at 11:01

## 2019-01-08 RX ADMIN — PHENAZOPYRIDINE HYDROCHLORIDE 200 MG: 100 TABLET ORAL at 11:01

## 2019-01-08 RX ADMIN — PHENAZOPYRIDINE HYDROCHLORIDE 200 MG: 100 TABLET ORAL at 01:01

## 2019-01-08 RX ADMIN — DOCUSATE SODIUM 100 MG: 100 CAPSULE, LIQUID FILLED ORAL at 09:01

## 2019-01-08 RX ADMIN — MORPHINE SULFATE 4 MG: 10 INJECTION INTRAVENOUS at 07:01

## 2019-01-08 RX ADMIN — MUPIROCIN 1 G: 20 OINTMENT TOPICAL at 11:01

## 2019-01-08 RX ADMIN — ENOXAPARIN SODIUM 40 MG: 100 INJECTION SUBCUTANEOUS at 11:01

## 2019-01-08 RX ADMIN — DEXTROSE AND SODIUM CHLORIDE: 5; .9 INJECTION, SOLUTION INTRAVENOUS at 08:01

## 2019-01-08 RX ADMIN — PHENAZOPYRIDINE HYDROCHLORIDE 200 MG: 100 TABLET ORAL at 08:01

## 2019-01-08 RX ADMIN — POLYETHYLENE GLYCOL 3350 17 G: 17 POWDER, FOR SOLUTION ORAL at 11:01

## 2019-01-08 NOTE — PROGRESS NOTES
[facility]  Urology  Progress Note    Patient Name: Hugo Lockhart  MRN: [id number]  Admission Date: 1/2/2019  Hospital Length of Stay: 6 days  Code Status: Full Code   Attending Provider: Mj Esteves MD   Primary Care Physician: [facility]    Subjective:     HPI:  72yo M with elevated PSA, lytic bone lesions. Dr Vasques saw pt as inpatient consultation 12/18/18 for similar as well as possible UR. Recommended possible prostate biopsy as outpatient. Pt scheduled to see me (Dr. Mccarthy) later this month. He is now admitted for R femur fracture.     PSA 12/18/18 - 13.4, 1/2/19 - 10.8. Concern for prostatitis in 12/18.      History is otherwise limited 2/2 dementia. History provided by chart and pt's daughter.     Interval History: Voiding per chart review.  No family at bedside to discuss biopsy.    Review of Systems   Unable to perform ROS: Dementia     Objective:     Temp:  [97.7 °F (36.5 °C)-98.4 °F (36.9 °C)] 97.8 °F (36.6 °C)  Pulse:  [76-93] 78  Resp:  [18] 18  SpO2:  [95 %-97 %] 96 %  BP: (111-139)/(56-74) 111/74     Body mass index is 17.44 kg/m².    Date 01/08/19 0700 - 01/09/19 0659   Shift 2999-3609 6076-0919 8288-5401 24 Hour Total   INTAKE   I.V.(mL/kg) 1490(30.4)   1490(30.4)   Shift Total(mL/kg) 1490(30.4)   1490(30.4)   OUTPUT   Shift Total(mL/kg)       Weight (kg) 49 49 49 49     Post Void Cath Residual (mL): 36 mL (01/06/19 1905)    Drains          None          Physical Exam   Nursing note and vitals reviewed.  Constitutional: He is oriented to person, place, and time. He appears well-developed and well-nourished.   HENT:   Head: Normocephalic.   Eyes: Conjunctivae are normal.   Neck: Normal range of motion. Neck supple. No tracheal deviation present. No thyromegaly present.   Cardiovascular: Normal rate and normal heart sounds.    Pulmonary/Chest: Effort normal and breath sounds normal. No respiratory distress. He has no wheezes.   Abdominal: Soft. Bowel sounds  are normal. There is no hepatosplenomegaly. There is no tenderness. There is no rebound and no CVA tenderness. No hernia.   Musculoskeletal: Normal range of motion. He exhibits no edema or tenderness.   Lymphadenopathy:     He has no cervical adenopathy.   Neurological: He is alert and oriented to person, place, and time.   Skin: Skin is warm and dry. No rash noted. No erythema.     Psychiatric: He has a normal mood and affect. His behavior is normal. Judgment and thought content normal.       Significant Labs:    BMP:  Recent Labs   Lab 01/06/19  0529 01/07/19  0748 01/07/19  0930 01/08/19  0549    140  --  138   K 3.1* 4.2 3.4* 3.7    110  --  104   CO2 29 25  --  27   BUN 4* 4*  --  3*   CREATININE 0.5 0.5  --  0.5   CALCIUM 7.4* 7.2*  --  7.3*       CBC:   Recent Labs   Lab 01/06/19  0529 01/07/19  0930 01/08/19  0549   WBC 11.71 9.87 9.73   HGB 8.4*  8.4* 8.9*  8.9* 8.3*   HCT 24.9*  24.9* 27.8*  27.8* 24.6*    412* 373*       Blood Culture:   Recent Labs   Lab 01/02/19  1230 01/02/19  1245   LABBLOO No growth after 5 days. No growth after 5 days.     Urine Culture:   Recent Labs   Lab 01/02/19  1219   LABURIN No growth       Significant Imaging:                    Assessment/Plan:     Elevated PSA     - Concern for prostate cancer   - May also be related to UTI/catheter   - His PSA is only 10.8 and down from 13.4, these numbers are not consistent with metastatic prostate cancer unless he has an extremely high grade tumor.     - Consider for prostate biopsy - risks of infection, bleeding, etc discussed with pt's family.  Discussed this again on 1/4, they want to proceed with a biopsy despite the risks of infection.    Will start Cipro today for biopsy Wednesday  Need to hold Lovenox on Wednesday  Will return later today to see if family can consent    Enema in AM  NPO after midnight         BPH with urinary obstruction     - Smith removed 1/5/19   - PVR low         VTE Risk Mitigation  (From admission, onward)        Ordered     enoxaparin injection 40 mg  Daily      01/03/19 1510     Place TANYA hose  Until discontinued      01/03/19 1510     Place sequential compression device  Until discontinued      01/03/19 1510     IP VTE HIGH RISK PATIENT  Once      01/03/19 1510          W Gerardo Vasques MD  Urology  [facility]

## 2019-01-08 NOTE — PLAN OF CARE
Problem: Adult Inpatient Plan of Care  Goal: Plan of Care Review  Outcome: Ongoing (interventions implemented as appropriate)   01/08/19 9189   Plan of Care Review   Plan of Care Reviewed With patient   Pt remains free of falls and injuries. S/p IM nailing on 1/3. Dressing to hip CDI. Disoriented x3. Daughter states pt is much more alert and active tonight. Adequate u/o, incontinence care provided as needed. Turned frequently to prevent further skin breakdown. Dressing to sacrum changed this AM; other dressings remain intact. IVFs cont as scheduled. VSSAF. TEDs to BLE with sundance boots on. Pain medicine given Q4H for comfort.

## 2019-01-08 NOTE — CONSULTS
Consult Note  Palliative Care      Consult Requested By: Mj Esteves MD  Reason for Consult:      Goals of care    SUBJECTIVE:     History of Present Illness:      Past Medical History:   Diagnosis Date    Closed displaced intertrochanteric fracture of right femur 01/02/2019    Dementia     Pressure ulcer     01/02/2019, sacral spine with eschar    Requires assistance with all daily activities     Stroke     Unsteady gait      Past Surgical History:   Procedure Laterality Date    INSERTION, INTRAMEDULLARY ZAKIYA, FEMUR Right 1/3/2019    Performed by Tavares Gao MD at [facility] OR     History reviewed. No pertinent family history.  Social History     Tobacco Use    Smoking status: Never Smoker    Smokeless tobacco: Never Used   Substance Use Topics    Alcohol use: No     Frequency: Never    Drug use: No       Mental Status:        OBJECTIVE:     Pain Assessment:     Decision-Making Capacity:  Advanced Directives:  Living Arrangements:   Psychosocial, Spiritual, Cultural:  Patient's most important priorities:  Patient's biggest concerns/fears:  Previous death/end of life care history:  Patient's goals/hopes:      ASSESSMENT/PLAN:       Recommendations:     Duplicate order - see consult noted dated 1/7/19    CHANELL LaboyN, RN, CCRN, CHPN   Palliative Care Nurse Coordinator   [facility]  [phone]

## 2019-01-08 NOTE — SUBJECTIVE & OBJECTIVE
Interval History: no changes    Review of Systems   Unable to perform ROS: Dementia     Objective:     Temp:  [97.7 °F (36.5 °C)-98.4 °F (36.9 °C)] 97.8 °F (36.6 °C)  Pulse:  [76-93] 78  Resp:  [18] 18  SpO2:  [95 %-96 %] 96 %  BP: (111-139)/(56-74) 111/74     Body mass index is 17.44 kg/m².    Date 01/08/19 0700 - 01/09/19 0659   Shift 1245-6312 9139-0999 3438-9038 24 Hour Total   INTAKE   P.O. 120   120   I.V.(mL/kg) 1490(30.4)   1490(30.4)   Shift Total(mL/kg) 1610(32.9)   1610(32.9)   OUTPUT   Shift Total(mL/kg)       Weight (kg) 49 49 49 49     Post Void Cath Residual (mL): 36 mL (01/06/19 1905)    Drains          None          Physical Exam   Nursing note and vitals reviewed.  Constitutional: He is oriented to person, place, and time. He appears well-developed and well-nourished.   HENT:   Head: Normocephalic.   Eyes: Conjunctivae are normal.   Neck: Normal range of motion. Neck supple. No tracheal deviation present. No thyromegaly present.   Cardiovascular: Normal rate and normal heart sounds.    Pulmonary/Chest: Effort normal and breath sounds normal. No respiratory distress. He has no wheezes.   Abdominal: Soft. Bowel sounds are normal. There is no hepatosplenomegaly. There is no tenderness. There is no rebound and no CVA tenderness. No hernia.   Musculoskeletal: Normal range of motion. He exhibits no edema or tenderness.   Lymphadenopathy:     He has no cervical adenopathy.   Neurological: He is alert and oriented to person, place, and time.   Skin: Skin is warm and dry. No rash noted. No erythema.     Psychiatric: He has a normal mood and affect. His behavior is normal. Judgment and thought content normal.       Significant Labs:    BMP:  Recent Labs   Lab 01/06/19  0529 01/07/19  0748 01/07/19  0930 01/08/19  0549    140  --  138   K 3.1* 4.2 3.4* 3.7    110  --  104   CO2 29 25  --  27   BUN 4* 4*  --  3*   CREATININE 0.5 0.5  --  0.5   CALCIUM 7.4* 7.2*  --  7.3*       CBC:   Recent Labs    Lab 01/06/19  0529 01/07/19  0930 01/08/19  0549   WBC 11.71 9.87 9.73   HGB 8.4*  8.4* 8.9*  8.9* 8.3*   HCT 24.9*  24.9* 27.8*  27.8* 24.6*    412* 373*           Significant Imaging:

## 2019-01-08 NOTE — ASSESSMENT & PLAN NOTE
- Concern for prostate cancer   - May also be related to UTI/catheter   - His PSA is only 10.8 and down from 13.4, these numbers are not consistent with metastatic prostate cancer unless he has an extremely high grade tumor.     - Consider for prostate biopsy - risks of infection, bleeding, etc discussed with pt's family.  Discussed this again on 1/4, they want to proceed with a biopsy despite the risks of infection.    Will start Cipro today for biopsy Wednesday  Need to hold Lovenox on Wednesday  I spoke with his daughter on the phone.  She wants to proceed.  She wants to do an in person consent in the morning    Enema in AM  NPO after midnight

## 2019-01-08 NOTE — PLAN OF CARE
Problem: Adult Inpatient Plan of Care  Goal: Plan of Care Review  Outcome: Ongoing (interventions implemented as appropriate)  Pt is disoriented to time, place, and situation, #22g L FA infusing D5NS @125, pt tolerating abx well, NADN or s/s of pain, pt is verbal but does not make sense, very confused and hard to understand. Needs continuous reinforcement of POC, also reviewed with son at BS, not able to make needs known, safety maintained, will cont to monitor.

## 2019-01-08 NOTE — PLAN OF CARE
Problem: Adult Inpatient Plan of Care  Goal: Plan of Care Review  Outcome: Ongoing (interventions implemented as appropriate)  Pt turn Q2, IVF cont and IV abx per order, bilat sundance boots to elevated heels, Ortho and Hem/Onc/Pallative Care/Urology following, dtr at bedside assisting w/care, safety maintained, bed low locked and in position, will cont plan of care

## 2019-01-08 NOTE — PROGRESS NOTES
SW attempted discharge plans with pt and family. Pt family not at bedside, however, according to notes, palliltive care nurse, Bushra, has a meeting arranged with son to discuss next level of care. SW will continue to follow.

## 2019-01-08 NOTE — ASSESSMENT & PLAN NOTE
Not sure of acuity as unable to get history.  Ortho consulted.  Patient is very frail and thin. Will leave up to ortho if appropriate for surgery.  I would expect a possible difficult recovery.  At least for now, no cardio/pulmonary active issues. As of now, patient is supposed to have a nailing done but daughter has many questions for ortho.  S/P IM nailing of R femur on 1/3/19  Not sure of dispo- SNF?  But PT/OT state little progress as sessions limited by pain and dementia.  May end up being hospice in the end. Family meeting with palliative care Wed.

## 2019-01-08 NOTE — PROGRESS NOTES
[facility]  Urology  Progress Note    Patient Name: Hugo Lockhart  MRN: [id number]  Admission Date: 1/2/2019  Hospital Length of Stay: 6 days  Code Status: Full Code   Attending Provider: Mj Esteves MD   Primary Care Physician: [facility]    Subjective:     HPI:  72yo M with elevated PSA, lytic bone lesions. Dr Vasques saw pt as inpatient consultation 12/18/18 for similar as well as possible UR. Recommended possible prostate biopsy as outpatient. Pt scheduled to see me (Dr. Mccarthy) later this month. He is now admitted for R femur fracture.     PSA 12/18/18 - 13.4, 1/2/19 - 10.8. Concern for prostatitis in 12/18.      History is otherwise limited 2/2 dementia. History provided by chart and pt's daughter.     Interval History: no changes    Review of Systems   Unable to perform ROS: Dementia     Objective:     Temp:  [97.7 °F (36.5 °C)-98.4 °F (36.9 °C)] 97.8 °F (36.6 °C)  Pulse:  [76-93] 78  Resp:  [18] 18  SpO2:  [95 %-96 %] 96 %  BP: (111-139)/(56-74) 111/74     Body mass index is 17.44 kg/m².    Date 01/08/19 0700 - 01/09/19 0659   Shift 2691-0127 1202-3858 1367-3320 24 Hour Total   INTAKE   P.O. 120   120   I.V.(mL/kg) 1490(30.4)   1490(30.4)   Shift Total(mL/kg) 1610(32.9)   1610(32.9)   OUTPUT   Shift Total(mL/kg)       Weight (kg) 49 49 49 49     Post Void Cath Residual (mL): 36 mL (01/06/19 1905)    Drains          None          Physical Exam   Nursing note and vitals reviewed.  Constitutional: He is oriented to person, place, and time. He appears well-developed and well-nourished.   HENT:   Head: Normocephalic.   Eyes: Conjunctivae are normal.   Neck: Normal range of motion. Neck supple. No tracheal deviation present. No thyromegaly present.   Cardiovascular: Normal rate and normal heart sounds.    Pulmonary/Chest: Effort normal and breath sounds normal. No respiratory distress. He has no wheezes.   Abdominal: Soft. Bowel sounds are normal. There is no  hepatosplenomegaly. There is no tenderness. There is no rebound and no CVA tenderness. No hernia.   Musculoskeletal: Normal range of motion. He exhibits no edema or tenderness.   Lymphadenopathy:     He has no cervical adenopathy.   Neurological: He is alert and oriented to person, place, and time.   Skin: Skin is warm and dry. No rash noted. No erythema.     Psychiatric: He has a normal mood and affect. His behavior is normal. Judgment and thought content normal.       Significant Labs:    BMP:  Recent Labs   Lab 01/06/19  0529 01/07/19  0748 01/07/19  0930 01/08/19  0549    140  --  138   K 3.1* 4.2 3.4* 3.7    110  --  104   CO2 29 25  --  27   BUN 4* 4*  --  3*   CREATININE 0.5 0.5  --  0.5   CALCIUM 7.4* 7.2*  --  7.3*       CBC:   Recent Labs   Lab 01/06/19  0529 01/07/19  0930 01/08/19  0549   WBC 11.71 9.87 9.73   HGB 8.4*  8.4* 8.9*  8.9* 8.3*   HCT 24.9*  24.9* 27.8*  27.8* 24.6*    412* 373*           Significant Imaging:                    Assessment/Plan:     Elevated PSA     - Concern for prostate cancer   - May also be related to UTI/catheter   - His PSA is only 10.8 and down from 13.4, these numbers are not consistent with metastatic prostate cancer unless he has an extremely high grade tumor.     - Consider for prostate biopsy - risks of infection, bleeding, etc discussed with pt's family.  Discussed this again on 1/4, they want to proceed with a biopsy despite the risks of infection.    Will start Cipro today for biopsy Wednesday  Need to hold Lovenox on Wednesday  I spoke with his daughter on the phone.  She wants to proceed.  She wants to do an in person consent in the morning    Enema in AM  NPO after midnight         BPH with urinary obstruction     - Smith removed 1/5/19   - PVR low         VTE Risk Mitigation (From admission, onward)        Ordered     enoxaparin injection 40 mg  Daily      01/03/19 1510     Place TANYA hose  Until discontinued      01/03/19 1510      Place sequential compression device  Until discontinued      01/03/19 1510     IP VTE HIGH RISK PATIENT  Once      01/03/19 1510          W Gerardo Vasques MD  Urology  [facility]

## 2019-01-08 NOTE — ASSESSMENT & PLAN NOTE
- Concern for prostate cancer   - May also be related to UTI/catheter   - His PSA is only 10.8 and down from 13.4, these numbers are not consistent with metastatic prostate cancer unless he has an extremely high grade tumor.     - Consider for prostate biopsy - risks of infection, bleeding, etc discussed with pt's family.  Discussed this again on 1/4, they want to proceed with a biopsy despite the risks of infection.    Will start Cipro today for biopsy Wednesday  Need to hold Lovenox on Wednesday  Will return later today to see if family can consent    Enema in AM  NPO after midnight

## 2019-01-08 NOTE — PROGRESS NOTES
PALLIATIVE CARE PROGRESS  NOTE:    Spoke to patient's son, and set family meeting for tomorrow Wednesday 1/9/19 at 1:30 p.m. To discuss goals of care.    CHANELL LaboyN, RN, CCRN, CHPN   Palliative Care Nurse Coordinator   [facility]  [phone]

## 2019-01-08 NOTE — PT/OT/SLP PROGRESS
Occupational Therapy      Patient Name:  Hugo Lockhart   MRN:  [id number]    Patient not seen today secondary to Patient unwilling to participate. Patient verbalized refusal x2. Will follow-up as able.    CECI Preston, MS  1/8/2019

## 2019-01-08 NOTE — PT/OT/SLP PROGRESS
Physical Therapy Treatment    Patient Name:  Hugo Lockhart   MRN:  [id number]    Recommendations:     Discharge Recommendations:  nursing facility, skilled   Discharge Equipment Recommendations: none   Barriers to discharge: decreased mobility and cognition    Assessment:     Hugo Lockhart is a 73 y.o. male admitted with a medical diagnosis of Closed displaced intertrochanteric fracture of right femur.  He presents with the following impairments/functional limitations:  weakness, impaired endurance, impaired self care skills, gait instability, impaired functional mobilty, impaired balance, decreased upper extremity function, decreased lower extremity function, decreased coordination, impaired cognition, decreased safety awareness, pain, decreased ROM, edema, orthopedic precautions, visual deficits, impaired muscle length, impaired joint extensibility . Limited with OOB mobility 2* pt unable to follow command and unable to redirect pt for safety. Able to perform PROM BLE in supine in all available planes only. Pt with increased agitation at the end of treatment . Nurse notified.      Rehab Prognosis: Fair; patient would benefit from acute skilled PT services to address these deficits and reach maximum level of function.    Recent Surgery: Procedure(s) (LRB):  INSERTION, INTRAMEDULLARY ZAKIYA, FEMUR (Right) 5 Days Post-Op    Plan:     During this hospitalization, patient to be seen daily(per Ashley, PT) to address the identified rehab impairments via gait training, therapeutic activities, therapeutic exercises and progress toward the following goals:    · Plan of Care Expires:  01/18/19    Subjective     Chief Complaint: pain to touch   Patient/Family Comments/goals: pt mumbling , unable to understand.   Pain/Comfort:  · Location - Side 1: Right  · Location 1: leg  · Pain Addressed 1: Pre-medicate for activity, Nurse notified      Objective:     Communicated with nurse George prior to session.  Patient found all  lines intact, call button in reach and bed alarm on bed alarm, pressure relief boots, telemetry, peripheral IV  upon PT entry to room.     General Precautions: Standard, fall   Orthopedic Precautions:RLE weight bearing as tolerated   Braces: N/A       AM-PAC 6 CLICK MOBILITY  Turning over in bed (including adjusting bedclothes, sheets and blankets)?: 2  Sitting down on and standing up from a chair with arms (e.g., wheelchair, bedside commode, etc.): 2  Moving from lying on back to sitting on the side of the bed?: 2  Moving to and from a bed to a chair (including a wheelchair)?: 1  Need to walk in hospital room?: 1  Climbing 3-5 steps with a railing?: 1  Basic Mobility Total Score: 9       Therapeutic Activities and Exercises:  Performed PROM BLE in supine in all available planes x 10 reps .(  Pt tolerated fair )    Patient left HOB elevated pressure relief boots placed BLE with all lines intact, call button in reach, bed alarm on, nurse notified and brother present..    GOALS:   Multidisciplinary Problems     Physical Therapy Goals        Problem: Physical Therapy Goal    Goal Priority Disciplines Outcome Goal Variances Interventions   Physical Therapy Goal     PT, PT/OT Ongoing (interventions implemented as appropriate)     Description:  Goals to be met by: 2019     Patient will increase functional independence with mobility by performin. Supine to sit with Modified [place]  2. Sit to supine with Modified [place]  3. Sit to stand transfer with Modified [place]  4. Gait  x 200 feet with Modified [place] using Rolling Walker.    Recommend: SNF at time of discharge.                          Time Tracking:     PT Received On: 19  PT Start Time: 1200     PT Stop Time: 1214  PT Total Time (min): 14 min     Billable Minutes: Therapeutic Exercise 14    Treatment Type: Treatment  PT/PTA: PTA     PTA Visit Number: 2     Lelia Sanchez PTA  2019

## 2019-01-08 NOTE — PROGRESS NOTES
PALLIATIVE CARE PROGRESS NOTE:    Left voice message for patient's son Regino Lockhart ([phone]).  Will set up family meeting tomorrow.    Awaiting call back.    CHANELL LaboyN, RN, CCRN, CHPN   Palliative Care Nurse Coordinator   [facility]  [phone]

## 2019-01-08 NOTE — SUBJECTIVE & OBJECTIVE
Interval History: No new issues     Review of Systems   Unable to perform ROS: Dementia     Objective:     Vital Signs (Most Recent):  Temp: 97.8 °F (36.6 °C) (01/08/19 0754)  Pulse: 78 (01/08/19 0754)  Resp: 18 (01/08/19 0754)  BP: 111/74 (01/08/19 0754)  SpO2: 96 % (01/08/19 0754) Vital Signs (24h Range):  Temp:  [97.7 °F (36.5 °C)-98.4 °F (36.9 °C)] 97.8 °F (36.6 °C)  Pulse:  [76-93] 78  Resp:  [18] 18  SpO2:  [95 %-97 %] 96 %  BP: (111-139)/(56-74) 111/74     Weight: 49 kg (108 lb 0.4 oz)  Body mass index is 17.44 kg/m².    Intake/Output Summary (Last 24 hours) at 1/8/2019 0900  Last data filed at 1/8/2019 0700  Gross per 24 hour   Intake 2918 ml   Output --   Net 2918 ml      Physical Exam   Constitutional: He appears well-developed and well-nourished.   Cardiovascular: Normal rate and regular rhythm.   Pulmonary/Chest: Breath sounds normal.   Neurological: He is alert.   Skin: Skin is warm and dry.   Nursing note and vitals reviewed.      Significant Labs:   BMP:   Recent Labs   Lab 01/08/19  0549   GLU 88      K 3.7      CO2 27   BUN 3*   CREATININE 0.5   CALCIUM 7.3*   MG 1.5*  1.5*     CBC:   Recent Labs   Lab 01/07/19  0930 01/08/19  0549   WBC 9.87 9.73   HGB 8.9*  8.9* 8.3*   HCT 27.8*  27.8* 24.6*   * 373*       Significant Imaging:

## 2019-01-08 NOTE — SUBJECTIVE & OBJECTIVE
Interval History: Voiding per chart review.  No family at bedside to discuss biopsy.    Review of Systems   Unable to perform ROS: Dementia     Objective:     Temp:  [97.7 °F (36.5 °C)-98.4 °F (36.9 °C)] 97.8 °F (36.6 °C)  Pulse:  [76-93] 78  Resp:  [18] 18  SpO2:  [95 %-97 %] 96 %  BP: (111-139)/(56-74) 111/74     Body mass index is 17.44 kg/m².    Date 01/08/19 0700 - 01/09/19 0659   Shift 7725-5552 9545-2648 3996-7372 24 Hour Total   INTAKE   I.V.(mL/kg) 1490(30.4)   1490(30.4)   Shift Total(mL/kg) 1490(30.4)   1490(30.4)   OUTPUT   Shift Total(mL/kg)       Weight (kg) 49 49 49 49     Post Void Cath Residual (mL): 36 mL (01/06/19 1905)    Drains          None          Physical Exam   Nursing note and vitals reviewed.  Constitutional: He is oriented to person, place, and time. He appears well-developed and well-nourished.   HENT:   Head: Normocephalic.   Eyes: Conjunctivae are normal.   Neck: Normal range of motion. Neck supple. No tracheal deviation present. No thyromegaly present.   Cardiovascular: Normal rate and normal heart sounds.    Pulmonary/Chest: Effort normal and breath sounds normal. No respiratory distress. He has no wheezes.   Abdominal: Soft. Bowel sounds are normal. There is no hepatosplenomegaly. There is no tenderness. There is no rebound and no CVA tenderness. No hernia.   Musculoskeletal: Normal range of motion. He exhibits no edema or tenderness.   Lymphadenopathy:     He has no cervical adenopathy.   Neurological: He is alert and oriented to person, place, and time.   Skin: Skin is warm and dry. No rash noted. No erythema.     Psychiatric: He has a normal mood and affect. His behavior is normal. Judgment and thought content normal.       Significant Labs:    BMP:  Recent Labs   Lab 01/06/19  0529 01/07/19  0748 01/07/19  0930 01/08/19  0549    140  --  138   K 3.1* 4.2 3.4* 3.7    110  --  104   CO2 29 25  --  27   BUN 4* 4*  --  3*   CREATININE 0.5 0.5  --  0.5   CALCIUM 7.4*  7.2*  --  7.3*       CBC:   Recent Labs   Lab 01/06/19  0529 01/07/19  0930 01/08/19  0549   WBC 11.71 9.87 9.73   HGB 8.4*  8.4* 8.9*  8.9* 8.3*   HCT 24.9*  24.9* 27.8*  27.8* 24.6*    412* 373*       Blood Culture:   Recent Labs   Lab 01/02/19  1230 01/02/19  1245   LABBLOO No growth after 5 days. No growth after 5 days.     Urine Culture:   Recent Labs   Lab 01/02/19  1219   LABURIN No growth       Significant Imaging:

## 2019-01-08 NOTE — PROGRESS NOTES
"[facility]  Adult Nutrition  Progress Note    SUMMARY       Recommendations    1. Encourage adequate intake of meals & oral nutr supplement    2. Consider appetite stimulant if medically appropriate/consistent w/ goals of care  Goals: Initiate nutrition within 72 hrs  Nutrition Goal Status: goal met  Communication of RD Recs: (plan of care review)    Reason for Assessment    Reason For Assessment: RD follow-up  Diagnosis: (Femur fx)  Relevant Medical History: Stroke; s/p mayra placement of femur fx; Dementia  Interdisciplinary Rounds: did not attend  General Information Comments: Pt seen this AM. Appeared pleasantly confused & was unable to answer all of my questions appropriately. RN documentation shows pt consuming ~25% of meals. Pt was able to say he'd like to receive Vanilla Boost w/ meals. Palliative care notes reviewed; family meeting set up for tmrw. Pt w/ lytic rib lesions w/ concern for metastatic disease. Urology notes reviewed re: possible prostate bx. NFPE performed 1/4; see malnutrition section of note for details.   Nutrition Discharge Planning: Too soon to determine    Nutrition Risk Screen    Nutrition Risk Screen: large or nonhealing wound, burn or pressure injury    Nutrition/Diet History    Patient Reported Diet/Restrictions/Preferences: general  Food Preferences: recently started on ensure  Spiritual, Cultural Beliefs, Voodoo Practices, Values that Affect Care: no  Food Allergies: NKFA  Factors Affecting Nutritional Intake: impaired cognitive status/motor control    Anthropometrics    Temp: 97.8 °F (36.6 °C)  Height: 5' 6" (167.6 cm)(prev adm records)  Height (inches): 66 in  Weight Method: Estimated  Weight: 49 kg (108 lb 0.4 oz)  Weight (lb): 108.03 lb  Ideal Body Weight (IBW), Male: 142 lb  % Ideal Body Weight, Male (lb): 76.08 lb  % Ideal Body Weight Malnutrition: 70-79%: moderate deficit  BMI (Calculated): 17.5  BMI Grade: 17 - 18.4 protein-energy malnutrition grade " I  Weight Loss: unintentional  Usual Body Weight (UBW), kg: (daughter unsure, but does not think current weight is accurate ( believes weight has been ~ 100#). )       Lab/Procedures/Meds    Pertinent Labs Reviewed: reviewed  Pertinent Labs Comments: Mg 1.5  Pertinent Medications Reviewed: reviewed  Pertinent Medications Comments: docusate, Mg, IVF    Physical Findings/Assessment  Skin: multiple PU's present        Estimated/Assessed Needs    Weight Used For Calorie Calculations: 49 kg (108 lb 0.4 oz)  Energy Calorie Requirements (kcal):  (1450 kcal for baseline) 5422-2985 kcal for repletion  Energy Need Method: Kcal/kg(35-40)     Protein Requirements: 59-74g (1.2-1.5g/kg)  Weight Used For Protein Calculations: 49 kg (108 lb 0.4 oz)     Fluid Requirements (mL): 1ml/kcal or per MD  Estimated Fluid Requirement Method: RDA Method    Nutrition Prescription Ordered    Current Diet Order: Reg    Evaluation of Received Nutrient/Fluid Intake    Other Calories (kcal): 600(via D5)  IV Fluid (mL): (D5 NS @ 125 mL/hr)  I/O: reviewed  Energy Calories Required: not meeting needs  Protein Required: not meeting needs  Fluid Required: (per MD)  Comments: LBM 1/1  Tolerance: tolerating  % Intake of Estimated Energy Needs: 25 - 50 %  % Meal Intake: 25 %    Nutrition Risk    Level of Risk/Frequency of Follow-up: (2 x week)     Assessment and Plan    Malnutrition in the context of Chronic Illness/Injury     Related to (etiology):  Dementia     Signs and Symptoms (as evidenced by):     Body Fat Depletion: moderate and severe depletion of orbitals, triceps and thoracic and lumbar region   Muscle Mass Depletion: severe depletion of temples, clavicle region, scapular region, interosseous muscle and lower extremities      Interventions:  Collaboration with providers     Nutrition Diagnosis Status:  Continues     Monitor and Evaluation    Food and Nutrient Intake: energy intake, food and beverage intake  Food and Nutrient Adminstration: diet  order  Physical Activity and Function: nutrition-related ADLs and IADLs  Anthropometric Measurements: weight, weight change  Biochemical Data, Medical Tests and Procedures: electrolyte and renal panel  Nutrition-Focused Physical Findings: overall appearance     Malnutrition Assessment  Malnutrition Type: chronic illness  Skin (Micronutrient): wounds unhealed       Subcutaneous Fat (Malnutrition): severe depletion  Muscle Mass (Malnutrition): severe depletion   Orbital Region (Subcutaneous Fat Loss): moderate depletion  Upper Arm Region (Subcutaneous Fat Loss): severe depletion  Thoracic and Lumbar Region: severe depletion   [place] (Muscle Loss): moderate depletion  Clavicle Bone Region (Muscle Loss): severe depletion  Clavicle and Acromion Bone Region (Muscle Loss): severe depletion  Scapular Bone Region (Muscle Loss): severe depletion  Dorsal Hand (Muscle Loss): severe depletion  Patellar Region (Muscle Loss): severe depletion  Anterior Thigh Region (Muscle Loss): severe depletion  Posterior Calf Region (Muscle Loss): severe depletion       Subcutaneous Fat Loss (Final Summary): severe protein-calorie malnutrition  Muscle Loss Evaluation (Final Summary): severe protein-calorie malnutrition         Nutrition Follow-Up    RD Follow-up?: Yes

## 2019-01-09 ENCOUNTER — ANESTHESIA (OUTPATIENT)
Dept: SURGERY | Facility: HOSPITAL | Age: 74
DRG: 480 | End: 2019-01-09
Payer: MEDICARE

## 2019-01-09 LAB
MAGNESIUM SERPL-MCNC: 1.8 MG/DL
MAGNESIUM SERPL-MCNC: 1.8 MG/DL

## 2019-01-09 PROCEDURE — 88305 TISSUE EXAM BY PATHOLOGIST: CPT | Mod: 26,,, | Performed by: PATHOLOGY

## 2019-01-09 PROCEDURE — 25000003 PHARM REV CODE 250: Performed by: UROLOGY

## 2019-01-09 PROCEDURE — D9220A PRA ANESTHESIA: ICD-10-PCS | Mod: ANES,,, | Performed by: ANESTHESIOLOGY

## 2019-01-09 PROCEDURE — 71000033 HC RECOVERY, INTIAL HOUR: Performed by: UROLOGY

## 2019-01-09 PROCEDURE — 25000003 PHARM REV CODE 250: Performed by: EMERGENCY MEDICINE

## 2019-01-09 PROCEDURE — 25000003 PHARM REV CODE 250: Performed by: ORTHOPAEDIC SURGERY

## 2019-01-09 PROCEDURE — 63600175 PHARM REV CODE 636 W HCPCS: Performed by: NURSE ANESTHETIST, CERTIFIED REGISTERED

## 2019-01-09 PROCEDURE — D9220A PRA ANESTHESIA: Mod: ANES,,, | Performed by: ANESTHESIOLOGY

## 2019-01-09 PROCEDURE — 36000705 HC OR TIME LEV I EA ADD 15 MIN: Performed by: UROLOGY

## 2019-01-09 PROCEDURE — 88341 IMHCHEM/IMCYTCHM EA ADD ANTB: CPT | Performed by: PATHOLOGY

## 2019-01-09 PROCEDURE — 63600175 PHARM REV CODE 636 W HCPCS: Performed by: EMERGENCY MEDICINE

## 2019-01-09 PROCEDURE — 88305 TISSUE SPECIMEN TO PATHOLOGY - SURGERY: ICD-10-PCS | Mod: 26,,, | Performed by: PATHOLOGY

## 2019-01-09 PROCEDURE — D9220A PRA ANESTHESIA: ICD-10-PCS | Mod: CRNA,,, | Performed by: NURSE ANESTHETIST, CERTIFIED REGISTERED

## 2019-01-09 PROCEDURE — 88341 TISSUE SPECIMEN TO PATHOLOGY - SURGERY: ICD-10-PCS | Mod: 26,,, | Performed by: PATHOLOGY

## 2019-01-09 PROCEDURE — D9220A PRA ANESTHESIA: Mod: CRNA,,, | Performed by: NURSE ANESTHETIST, CERTIFIED REGISTERED

## 2019-01-09 PROCEDURE — 76942 PR U/S GUIDANCE FOR NEEDLE GUIDANCE: ICD-10-PCS | Mod: 26,59,, | Performed by: UROLOGY

## 2019-01-09 PROCEDURE — 76872 US TRANSRECTAL: CPT | Mod: 26,,, | Performed by: UROLOGY

## 2019-01-09 PROCEDURE — 76872 PR US TRANSRECTAL: ICD-10-PCS | Mod: 26,,, | Performed by: UROLOGY

## 2019-01-09 PROCEDURE — 88342 TISSUE SPECIMEN TO PATHOLOGY - SURGERY: ICD-10-PCS | Mod: 26,,, | Performed by: PATHOLOGY

## 2019-01-09 PROCEDURE — 11000001 HC ACUTE MED/SURG PRIVATE ROOM

## 2019-01-09 PROCEDURE — 55700 PR BIOPSY OF PROSTATE,NEEDLE/PUNCH: CPT | Mod: ,,, | Performed by: UROLOGY

## 2019-01-09 PROCEDURE — 97530 THERAPEUTIC ACTIVITIES: CPT

## 2019-01-09 PROCEDURE — 25000003 PHARM REV CODE 250: Performed by: NURSE ANESTHETIST, CERTIFIED REGISTERED

## 2019-01-09 PROCEDURE — 99232 SBSQ HOSP IP/OBS MODERATE 35: CPT | Mod: 25,,, | Performed by: UROLOGY

## 2019-01-09 PROCEDURE — 36415 COLL VENOUS BLD VENIPUNCTURE: CPT

## 2019-01-09 PROCEDURE — 63600175 PHARM REV CODE 636 W HCPCS: Performed by: HOSPITALIST

## 2019-01-09 PROCEDURE — 71000039 HC RECOVERY, EACH ADD'L HOUR: Performed by: UROLOGY

## 2019-01-09 PROCEDURE — 37000008 HC ANESTHESIA 1ST 15 MINUTES: Performed by: UROLOGY

## 2019-01-09 PROCEDURE — 63600175 PHARM REV CODE 636 W HCPCS: Performed by: ANESTHESIOLOGY

## 2019-01-09 PROCEDURE — 37000009 HC ANESTHESIA EA ADD 15 MINS: Performed by: UROLOGY

## 2019-01-09 PROCEDURE — 36000704 HC OR TIME LEV I 1ST 15 MIN: Performed by: UROLOGY

## 2019-01-09 PROCEDURE — 88305 TISSUE EXAM BY PATHOLOGIST: CPT | Performed by: PATHOLOGY

## 2019-01-09 PROCEDURE — 88342 IMHCHEM/IMCYTCHM 1ST ANTB: CPT | Mod: 26,,, | Performed by: PATHOLOGY

## 2019-01-09 PROCEDURE — 88341 IMHCHEM/IMCYTCHM EA ADD ANTB: CPT | Mod: 26,,, | Performed by: PATHOLOGY

## 2019-01-09 PROCEDURE — 99232 PR SUBSEQUENT HOSPITAL CARE,LEVL II: ICD-10-PCS | Mod: 25,,, | Performed by: UROLOGY

## 2019-01-09 PROCEDURE — 76942 ECHO GUIDE FOR BIOPSY: CPT | Mod: 26,59,, | Performed by: UROLOGY

## 2019-01-09 PROCEDURE — 63600175 PHARM REV CODE 636 W HCPCS

## 2019-01-09 PROCEDURE — 55700 PR BIOPSY OF PROSTATE,NEEDLE/PUNCH: ICD-10-PCS | Mod: ,,, | Performed by: UROLOGY

## 2019-01-09 PROCEDURE — 83735 ASSAY OF MAGNESIUM: CPT

## 2019-01-09 RX ORDER — SODIUM CHLORIDE 0.9 % (FLUSH) 0.9 %
3 SYRINGE (ML) INJECTION
Status: DISCONTINUED | OUTPATIENT
Start: 2019-01-09 | End: 2019-01-09 | Stop reason: HOSPADM

## 2019-01-09 RX ORDER — LIDOCAINE HYDROCHLORIDE 20 MG/ML
JELLY TOPICAL
Status: DISCONTINUED | OUTPATIENT
Start: 2019-01-09 | End: 2019-01-09 | Stop reason: HOSPADM

## 2019-01-09 RX ORDER — HYDROMORPHONE HYDROCHLORIDE 2 MG/ML
0.2 INJECTION, SOLUTION INTRAMUSCULAR; INTRAVENOUS; SUBCUTANEOUS EVERY 5 MIN PRN
Status: DISCONTINUED | OUTPATIENT
Start: 2019-01-09 | End: 2019-01-09 | Stop reason: HOSPADM

## 2019-01-09 RX ORDER — LIDOCAINE HCL/PF 100 MG/5ML
SYRINGE (ML) INTRAVENOUS
Status: DISCONTINUED | OUTPATIENT
Start: 2019-01-09 | End: 2019-01-09

## 2019-01-09 RX ORDER — HYDROCODONE BITARTRATE AND ACETAMINOPHEN 5; 325 MG/1; MG/1
1 TABLET ORAL EVERY 6 HOURS PRN
Status: DISCONTINUED | OUTPATIENT
Start: 2019-01-09 | End: 2019-01-09

## 2019-01-09 RX ORDER — SODIUM CHLORIDE, SODIUM LACTATE, POTASSIUM CHLORIDE, CALCIUM CHLORIDE 600; 310; 30; 20 MG/100ML; MG/100ML; MG/100ML; MG/100ML
INJECTION, SOLUTION INTRAVENOUS CONTINUOUS PRN
Status: DISCONTINUED | OUTPATIENT
Start: 2019-01-09 | End: 2019-01-09

## 2019-01-09 RX ORDER — PROPOFOL 10 MG/ML
VIAL (ML) INTRAVENOUS
Status: DISCONTINUED | OUTPATIENT
Start: 2019-01-09 | End: 2019-01-09

## 2019-01-09 RX ORDER — FENTANYL CITRATE 50 UG/ML
INJECTION, SOLUTION INTRAMUSCULAR; INTRAVENOUS
Status: DISCONTINUED | OUTPATIENT
Start: 2019-01-09 | End: 2019-01-09

## 2019-01-09 RX ORDER — PHENYLEPHRINE HYDROCHLORIDE 10 MG/ML
INJECTION INTRAVENOUS
Status: DISCONTINUED | OUTPATIENT
Start: 2019-01-09 | End: 2019-01-09

## 2019-01-09 RX ORDER — SODIUM CHLORIDE 9 MG/ML
INJECTION, SOLUTION INTRAVENOUS CONTINUOUS
Status: DISCONTINUED | OUTPATIENT
Start: 2019-01-09 | End: 2019-01-11 | Stop reason: HOSPADM

## 2019-01-09 RX ORDER — LIDOCAINE HYDROCHLORIDE 10 MG/ML
INJECTION, SOLUTION EPIDURAL; INFILTRATION; INTRACAUDAL; PERINEURAL
Status: DISCONTINUED | OUTPATIENT
Start: 2019-01-09 | End: 2019-01-09 | Stop reason: HOSPADM

## 2019-01-09 RX ORDER — MORPHINE SULFATE 10 MG/ML
4 INJECTION INTRAMUSCULAR; INTRAVENOUS; SUBCUTANEOUS EVERY 4 HOURS PRN
Status: DISCONTINUED | OUTPATIENT
Start: 2019-01-09 | End: 2019-01-10

## 2019-01-09 RX ORDER — LORAZEPAM 2 MG/ML
0.25 INJECTION INTRAMUSCULAR ONCE AS NEEDED
Status: DISCONTINUED | OUTPATIENT
Start: 2019-01-09 | End: 2019-01-09 | Stop reason: HOSPADM

## 2019-01-09 RX ORDER — GLYCOPYRROLATE 0.2 MG/ML
INJECTION INTRAMUSCULAR; INTRAVENOUS
Status: DISCONTINUED | OUTPATIENT
Start: 2019-01-09 | End: 2019-01-09

## 2019-01-09 RX ORDER — METOCLOPRAMIDE HYDROCHLORIDE 5 MG/ML
10 INJECTION INTRAMUSCULAR; INTRAVENOUS EVERY 10 MIN PRN
Status: DISCONTINUED | OUTPATIENT
Start: 2019-01-09 | End: 2019-01-09 | Stop reason: HOSPADM

## 2019-01-09 RX ORDER — HYDROMORPHONE HYDROCHLORIDE 2 MG/ML
INJECTION, SOLUTION INTRAMUSCULAR; INTRAVENOUS; SUBCUTANEOUS
Status: COMPLETED
Start: 2019-01-09 | End: 2019-01-09

## 2019-01-09 RX ADMIN — PROPOFOL 10 MG: 10 INJECTION, EMULSION INTRAVENOUS at 01:01

## 2019-01-09 RX ADMIN — SODIUM CHLORIDE, SODIUM LACTATE, POTASSIUM CHLORIDE, AND CALCIUM CHLORIDE: .6; .31; .03; .02 INJECTION, SOLUTION INTRAVENOUS at 01:01

## 2019-01-09 RX ADMIN — PHENYLEPHRINE HYDROCHLORIDE 100 MCG: 10 INJECTION INTRAVENOUS at 01:01

## 2019-01-09 RX ADMIN — HYDROMORPHONE HYDROCHLORIDE 0.2 MG: 2 INJECTION, SOLUTION INTRAMUSCULAR; INTRAVENOUS; SUBCUTANEOUS at 02:01

## 2019-01-09 RX ADMIN — ATORVASTATIN CALCIUM 40 MG: 40 TABLET, FILM COATED ORAL at 09:01

## 2019-01-09 RX ADMIN — SODIUM PHOSPHATE, DIBASIC AND SODIUM PHOSPHATE, MONOBASIC 1 ENEMA: 7; 19 ENEMA RECTAL at 08:01

## 2019-01-09 RX ADMIN — DOCUSATE SODIUM 100 MG: 100 CAPSULE, LIQUID FILLED ORAL at 09:01

## 2019-01-09 RX ADMIN — MORPHINE SULFATE 4 MG: 10 INJECTION INTRAVENOUS at 01:01

## 2019-01-09 RX ADMIN — PROPOFOL 30 MG: 10 INJECTION, EMULSION INTRAVENOUS at 01:01

## 2019-01-09 RX ADMIN — HYDROCODONE BITARTRATE AND ACETAMINOPHEN 1 TABLET: 5; 325 TABLET ORAL at 08:01

## 2019-01-09 RX ADMIN — LIDOCAINE HYDROCHLORIDE 100 MG: 20 INJECTION, SOLUTION INTRAVENOUS at 01:01

## 2019-01-09 RX ADMIN — PHENAZOPYRIDINE HYDROCHLORIDE 200 MG: 100 TABLET ORAL at 05:01

## 2019-01-09 RX ADMIN — HYDROCODONE BITARTRATE AND ACETAMINOPHEN 1 TABLET: 5; 325 TABLET ORAL at 09:01

## 2019-01-09 RX ADMIN — FENTANYL CITRATE 50 MCG: 50 INJECTION INTRAMUSCULAR; INTRAVENOUS at 01:01

## 2019-01-09 RX ADMIN — PHENAZOPYRIDINE HYDROCHLORIDE 200 MG: 100 TABLET ORAL at 09:01

## 2019-01-09 RX ADMIN — GLYCOPYRROLATE 0.1 MG: 0.2 INJECTION, SOLUTION INTRAMUSCULAR; INTRAVENOUS at 01:01

## 2019-01-09 RX ADMIN — MORPHINE SULFATE 4 MG: 10 INJECTION INTRAVENOUS at 09:01

## 2019-01-09 NOTE — TRANSFER OF CARE
"Anesthesia Transfer of Care Note    Patient: Hugo Lockhart    Procedure(s) Performed: Procedure(s) (LRB):  BIOPSY, PROSTATE, RECTAL APPROACH, WITH US GUIDANCE (N/A)    Patient location: PACU    Anesthesia Type: MAC    Transport from OR: Transported from OR on room air with adequate spontaneous ventilation    Post pain: adequate analgesia    Post assessment: no apparent anesthetic complications    Post vital signs: stable    Level of consciousness: awake    Nausea/Vomiting: no nausea/vomiting    Complications: none    Transfer of care protocol was followed      Last vitals:   Visit Vitals  /75 (BP Location: Right arm, Patient Position: Lying)   Pulse 85   Temp 36.2 °C (97.1 °F) (Oral)   Resp 14   Ht 5' 6" (1.676 m)   Wt 50.8 kg (111 lb 15.9 oz)   SpO2 98%   BMI 18.08 kg/m²     "

## 2019-01-09 NOTE — SUBJECTIVE & OBJECTIVE
Interval History: No new issues     Review of Systems   Unable to perform ROS: Dementia     Objective:     Vital Signs (Most Recent):  Temp: 97.8 °F (36.6 °C) (01/09/19 0736)  Pulse: 82 (01/09/19 0736)  Resp: 18 (01/09/19 0736)  BP: (!) 145/75 (01/09/19 0736)  SpO2: 96 % (01/09/19 0736) Vital Signs (24h Range):  Temp:  [97.8 °F (36.6 °C)-99 °F (37.2 °C)] 97.8 °F (36.6 °C)  Pulse:  [75-93] 82  Resp:  [18] 18  SpO2:  [95 %-97 %] 96 %  BP: (105-145)/(57-78) 145/75     Weight: 50.8 kg (111 lb 15.9 oz)  Body mass index is 18.08 kg/m².    Intake/Output Summary (Last 24 hours) at 1/9/2019 1000  Last data filed at 1/9/2019 0819  Gross per 24 hour   Intake 1815 ml   Output 200 ml   Net 1615 ml      Physical Exam   Constitutional: He appears well-developed and well-nourished.   Cardiovascular: Normal rate and regular rhythm.   Pulmonary/Chest: Breath sounds normal.   Neurological: He is alert.   Skin: Skin is warm and dry.   Nursing note and vitals reviewed.      Significant Labs:   BMP:   Recent Labs   Lab 01/08/19 0549 01/09/19  0539   GLU 88  --      --    K 3.7  --      --    CO2 27  --    BUN 3*  --    CREATININE 0.5  --    CALCIUM 7.3*  --    MG 1.5*  1.5* 1.8  1.8     CBC:   Recent Labs   Lab 01/08/19  0549   WBC 9.73   HGB 8.3*   HCT 24.6*   *       Significant Imaging:

## 2019-01-09 NOTE — CARE UPDATE
Patient with probable prostate CA.  Nurse Padmini with Palliative Care met with family and family agrees on DNR.

## 2019-01-09 NOTE — SUBJECTIVE & OBJECTIVE
Interval History: No changes.  Started Cipro yesterday for biopsy today.  Lovenox held for today.    Review of Systems   Unable to perform ROS: Dementia     Objective:     Temp:  [97.8 °F (36.6 °C)-99 °F (37.2 °C)] 98.2 °F (36.8 °C)  Pulse:  [75-93] 75  Resp:  [18] 18  SpO2:  [95 %-97 %] 97 %  BP: (105-134)/(57-78) 105/60     Body mass index is 18.08 kg/m².       Post Void Cath Residual (mL): 36 mL (01/06/19 1905)    Drains          None          Physical Exam   Nursing note and vitals reviewed.  Constitutional: He is oriented to person, place, and time. He appears well-developed and well-nourished.   HENT:   Head: Normocephalic.   Eyes: Conjunctivae are normal.   Neck: Normal range of motion. Neck supple. No tracheal deviation present. No thyromegaly present.   Cardiovascular: Normal rate and normal heart sounds.    Pulmonary/Chest: Effort normal and breath sounds normal. No respiratory distress. He has no wheezes.   Abdominal: Soft. Bowel sounds are normal. There is no hepatosplenomegaly. There is no tenderness. There is no rebound and no CVA tenderness. No hernia.   Musculoskeletal: Normal range of motion. He exhibits no edema or tenderness.   Lymphadenopathy:     He has no cervical adenopathy.   Neurological: He is alert and oriented to person, place, and time.   Skin: Skin is warm and dry. No rash noted. No erythema.     Psychiatric: He has a normal mood and affect. His behavior is normal. Judgment and thought content normal.       Significant Labs:    BMP:  Recent Labs   Lab 01/06/19  0529 01/07/19  0748 01/07/19  0930 01/08/19  0549    140  --  138   K 3.1* 4.2 3.4* 3.7    110  --  104   CO2 29 25  --  27   BUN 4* 4*  --  3*   CREATININE 0.5 0.5  --  0.5   CALCIUM 7.4* 7.2*  --  7.3*       CBC:   Recent Labs   Lab 01/06/19  0529 01/07/19  0930 01/08/19  0549   WBC 11.71 9.87 9.73   HGB 8.4*  8.4* 8.9*  8.9* 8.3*   HCT 24.9*  24.9* 27.8*  27.8* 24.6*    412* 373*       Blood Culture:    Recent Labs   Lab 01/02/19  1230 01/02/19  1245   LABBLOO No growth after 5 days. No growth after 5 days.     Urine Culture:   Recent Labs   Lab 01/02/19  1219   LABURIN No growth       Significant Imaging:

## 2019-01-09 NOTE — PLAN OF CARE
Problem: Physical Therapy Goal  Goal: Physical Therapy Goal  Goals to be met by: 2019     Patient will increase functional independence with mobility by performin. Supine to sit with Modified [place]  2. Sit to supine with Modified [place]  3. Sit to stand transfer with Modified [place]  4. Gait  x 200 feet with Modified [place] using Rolling Walker.    Recommend: SNF at time of discharge.         Outcome: Ongoing (interventions implemented as appropriate)  Pt with limited ability to follow commands to participate in therapy, unable to redirect pt for safety. Performed bed mobility with dependent A. Pt able to tolerated sitting balance EOB x 5 min, dependent A ( pt with  posterior leaning).  Pt progressing slowly toward treatment goals.

## 2019-01-09 NOTE — PROGRESS NOTES
No new Ortho issues. Prostate biopsy planned for today  VSS    Rt IT fx  Cont current mgmt  Stable from Ortho standpoint  Awaiting placement

## 2019-01-09 NOTE — PT/OT/SLP PROGRESS
Physical Therapy Treatment    Patient Name:  Hugo Lockhart   MRN:  [id number]    Recommendations:     Discharge Recommendations:  nursing facility, skilled   Discharge Equipment Recommendations: none   Barriers to discharge: pt with decreased mobility and cognition     Assessment:     Hugo Lockhart is a 73 y.o. male admitted with a medical diagnosis of Closed displaced intertrochanteric fracture of right femur.  He presents with the following impairments/functional limitations:  weakness, impaired endurance, impaired self care skills, impaired functional mobilty, gait instability, impaired balance, impaired cognition, decreased coordination, decreased upper extremity function, decreased lower extremity function, decreased safety awareness, pain, decreased ROM, impaired skin, impaired joint extensibility, orthopedic precautions, edema, impaired muscle length .Pt with limited ability to follow commands to participate in therapy, unable to redirect pt for safety. Performed bed mobility with dependent A. Pt able to tolerated sitting balance EOB x 5 min, dependent A ( pt with  posterior leaning).  Pt progressing slowly toward treatment goals.     Rehab Prognosis: Poor; patient would benefit from acute skilled PT services to address these deficits and reach maximum level of function.    Recent Surgery: Procedure(s) (LRB):  BIOPSY, PROSTATE, RECTAL APPROACH, WITH US GUIDANCE (N/A) Day of Surgery    Plan:     During this hospitalization, patient to be seen daily(per Ashley, PT) to address the identified rehab impairments via gait training, therapeutic activities, therapeutic exercises and progress toward the following goals:    · Plan of Care Expires:  01/18/19    Subjective     Chief Complaint: pain to touch  Patient/Family Comments/goals: pt mumbling, unable to understand.   Pain/Comfort:  · Location - Side 1: Right  · Location 1: hip  · Pain Addressed 1: Nurse notified      Objective:     Communicated with  nurse Nevarez prior to session.  Patient found all lines intact, call button in reach and bed alarm on bed alarm, pressure relief boots, telemetry, peripheral IV  upon PT entry to room.     General Precautions: Standard, fall   Orthopedic Precautions:RLE weight bearing as tolerated   Braces:  n/a     Functional Mobility: performed turning/rolling in bed x 2 trials to don/doff diaper and bed sheet changed. Pt resisted with all movements.   · Bed Mobility:     · Rolling Left:  dependence  · Rolling Right: dependence  · Scooting: dependence x 2   · Supine to Sit: dependence and of 2 persons, HOB elevated   · Sit to Supine: dependence and of 2 persons      AM-PAC 6 CLICK MOBILITY  Turning over in bed (including adjusting bedclothes, sheets and blankets)?: 2  Sitting down on and standing up from a chair with arms (e.g., wheelchair, bedside commode, etc.): 1  Moving from lying on back to sitting on the side of the bed?: 1  Moving to and from a bed to a chair (including a wheelchair)?: 1  Need to walk in hospital room?: 1  Climbing 3-5 steps with a railing?: 1  Basic Mobility Total Score: 7       Therapeutic Activities and Exercises:   performed bed mobility as above.   Pt tolerated sitting balance EOB x 5 min , dependent A for balance. Pt with  posterior lean , required max V/T cues for upright posture.   Performed PROM in supine BLE x 10 reps : ankle DF/PF, HS.     Patient left left sidelying , pillow between knees , pressure relief boots placed BLE with all lines intact, call button in reach, bed alarm on and nurse notified..    GOALS:   Multidisciplinary Problems     Physical Therapy Goals        Problem: Physical Therapy Goal    Goal Priority Disciplines Outcome Goal Variances Interventions   Physical Therapy Goal     PT, PT/OT Ongoing (interventions implemented as appropriate)     Description:  Goals to be met by: 2019     Patient will increase functional independence with mobility by performin.  Supine to sit with Modified [place]  2. Sit to supine with Modified [place]  3. Sit to stand transfer with Modified [place]  4. Gait  x 200 feet with Modified [place] using Rolling Walker.    Recommend: SNF at time of discharge.                          Time Tracking:     PT Received On: 01/09/19  PT Start Time: 1103     PT Stop Time: 1126  PT Total Time (min): 23 min     Billable Minutes: Therapeutic Activity 23    Treatment Type: Treatment  PT/PTA: PTA     PTA Visit Number: 4     Lelia Sanchez PTA  01/09/2019

## 2019-01-09 NOTE — PROGRESS NOTES
PALLIATIVE CARE PROGRESS NOTE:    Met with patient's son Kartik (only biological child)  and step-daughter Levi along with Dr. Vasques just after patient's prostate biopsy.  He explained procedure and that results will take several days to return.  He discussed patient could possibly have aggressive type of prostate cancer with low PSA reading.  Depending on path results (and if it's positive for prostate ca.) he may benefit from hormone treatment/injections.  They verbalized understanding.      Subsequent to above, I had long discussion with family.  They feel quality of life is very important and state the main goal of care is comfort. We discussed the option of hospice care vs nursing home vs home vs inpatient.  At this point they are interested in a hospice evaluation for inpatient care.  Daughter is familiar with location of [place].  Representative Alyssa was in the hospital and met with son to discuss options  - they will meet again tomorrow to review paper work and make final decisions when the daughter can also be persent.     We also discussed advance directives and code status.  Son and step-daughter both in agreement for DNR status.  They would NOT WANT CPR/RESUSCITATION.  We reviewed the [place] document in detail and completed it, son signed.  It will be placed in the blue folder after Dr. Esteves has signed it.    Step-daughter voiced concerns about pain medication coverage and is worried he is hurting when she is not present.  Reassurance offered to her that he does/will have pain needs addressed.      All questions were asked and answered to their satisfaction.  Emotional support provided.      Plan/recommendations:   Patient is hospice appropriate, and family in process of having hospice evaluation - no final decision yet.   CHANGE CODE STATUS TO DNR.   [place].   Palliative Care will continue to follow.    Discussed above with Dr. Esteves.      Luzmaria Brewer, BSN, RN, CCRN, CHPN    Palliative Care Nurse Coordinator   [facility]  [phone]

## 2019-01-09 NOTE — PROGRESS NOTES
[facility]  Urology  Progress Note    Patient Name: Hugo Lockhart  MRN: [id number]  Admission Date: 1/2/2019  Hospital Length of Stay: 7 days  Code Status: Full Code   Attending Provider: Mj Esteves MD   Primary Care Physician: [facility]    Subjective:     HPI:  74yo M with elevated PSA, lytic bone lesions. Dr Vasques saw pt as inpatient consultation 12/18/18 for similar as well as possible UR. Recommended possible prostate biopsy as outpatient. Pt scheduled to see me (Dr. Mccarthy) later this month. He is now admitted for R femur fracture.     PSA 12/18/18 - 13.4, 1/2/19 - 10.8. Concern for prostatitis in 12/18.      History is otherwise limited 2/2 dementia. History provided by chart and pt's daughter.     Interval History: No changes.  Started Cipro yesterday for biopsy today.  Lovenox held for today.    Review of Systems   Unable to perform ROS: Dementia     Objective:     Temp:  [97.8 °F (36.6 °C)-99 °F (37.2 °C)] 98.2 °F (36.8 °C)  Pulse:  [75-93] 75  Resp:  [18] 18  SpO2:  [95 %-97 %] 97 %  BP: (105-134)/(57-78) 105/60     Body mass index is 18.08 kg/m².       Post Void Cath Residual (mL): 36 mL (01/06/19 1905)    Drains          None          Physical Exam   Nursing note and vitals reviewed.  Constitutional: He is oriented to person, place, and time. He appears well-developed and well-nourished.   HENT:   Head: Normocephalic.   Eyes: Conjunctivae are normal.   Neck: Normal range of motion. Neck supple. No tracheal deviation present. No thyromegaly present.   Cardiovascular: Normal rate and normal heart sounds.    Pulmonary/Chest: Effort normal and breath sounds normal. No respiratory distress. He has no wheezes.   Abdominal: Soft. Bowel sounds are normal. There is no hepatosplenomegaly. There is no tenderness. There is no rebound and no CVA tenderness. No hernia.   Musculoskeletal: Normal range of motion. He exhibits no edema or tenderness.   Lymphadenopathy:     He has  no cervical adenopathy.   Neurological: He is alert and oriented to person, place, and time.   Skin: Skin is warm and dry. No rash noted. No erythema.     Psychiatric: He has a normal mood and affect. His behavior is normal. Judgment and thought content normal.       Significant Labs:    BMP:  Recent Labs   Lab 01/06/19  0529 01/07/19  0748 01/07/19  0930 01/08/19  0549    140  --  138   K 3.1* 4.2 3.4* 3.7    110  --  104   CO2 29 25  --  27   BUN 4* 4*  --  3*   CREATININE 0.5 0.5  --  0.5   CALCIUM 7.4* 7.2*  --  7.3*       CBC:   Recent Labs   Lab 01/06/19  0529 01/07/19  0930 01/08/19  0549   WBC 11.71 9.87 9.73   HGB 8.4*  8.4* 8.9*  8.9* 8.3*   HCT 24.9*  24.9* 27.8*  27.8* 24.6*    412* 373*       Blood Culture:   Recent Labs   Lab 01/02/19  1230 01/02/19  1245   LABBLOO No growth after 5 days. No growth after 5 days.     Urine Culture:   Recent Labs   Lab 01/02/19  1219   LABURIN No growth       Significant Imaging:                    Assessment/Plan:     Elevated PSA     - Concern for prostate cancer   - May also be related to UTI/catheter   - His PSA is only 10.8 and down from 13.4, these numbers are not consistent with metastatic prostate cancer unless he has an extremely high grade tumor.     - Consider for prostate biopsy - risks of infection, bleeding, etc discussed with pt's family.  Discussed this again on 1/4, they want to proceed with a biopsy despite the risks of infection.    Will start Cipro today for biopsy today  Need to hold Lovenox on today  Consent obtained with his daughter.  She understands the risks of bleeding and infection.    Enema before procedure  NPO until after procedure         BPH with urinary obstruction     - Smith removed 1/5/19   - PVR low         VTE Risk Mitigation (From admission, onward)        Ordered     Place TANYA hose  Until discontinued      01/03/19 1510     Place sequential compression device  Until discontinued      01/03/19 1510     IP VTE  HIGH RISK PATIENT  Once      01/03/19 1510          W Gerardo Vasques MD  Urology  [facility]

## 2019-01-09 NOTE — OP NOTE
DATE OF PROCEDURE:  01/09/2019.    PREOPERATIVE DIAGNOSIS:  Elevated PSA.    POSTOPERATIVE DIAGNOSIS:  Elevated PSA.    PROCEDURES PERFORMED:  Transrectal ultrasound, prostate volume study, prostate   needle biopsy.    PRIMARY SURGEON:  Brennan Vasques M.D.    ANESTHESIA:  General.    ESTIMATED BLOOD LOSS:  Minimal.    DRAINS:  None.    COMPLICATIONS:  None.    SPECIMENS REMOVED:  Prostate needle biopsies.    INDICATIONS FOR PROCEDURE:  Hugo Lockhart is a 73-year-old male with history   of elevated PSA, it was recommended that he undergo prostate needle biopsy.    PROCEDURE IN DETAIL:  Hugo Lockhart was taken to the Operating Room where he   was positively identified by armband.  He was placed supine on the operating   room table.  He was then placed in the left lateral decubitus position and a   preoperative timeout was performed as well as confirmation of preoperative   antibiotics.    Uro-Jet was used to anesthetize the rectum and a 12.4 MHz ultrasound probe was   then passed per anus into the rectum.  Prostate was visualized in the sagittal   and transverse planes.  The prostate volume was calculated to be 56 cubic cm.    The left and right neurovascular bundles were then anesthetized with a total of   10 mL of 1% lidocaine plain.    A total of 12 cores were taken from the prostate, 6 from the left and 6 from the   right, base, mid and apex laterally and medially.    The probe was then withdrawn.  Hemostasis was deemed adequate.    He was then transferred to the supine position.  He was then transferred to the   Recovery Room in stable condition.      KEARA/IN  dd: 01/09/2019 13:31:30 (CST)  td: 01/09/2019 13:47:08 (CST)  Doc ID   #[id number]  Job ID #[id number]    CC:

## 2019-01-09 NOTE — PROGRESS NOTES
POD#5  No new issues from Ortho standpoint. Minimal gains with PT  VSS    Rt leg- no d/c through the dsg. Unable/unwilling to comply with exam  Hct 24.6    A/P Rt femur fx IM nail  Check CBC in am. May need PRBCs if Hct drops further  Mobilize as dixie  Pt will need NH vs hospice due to underlying diagnoses,mental status and low capacity for recovery/inability to participate in rehab

## 2019-01-09 NOTE — PT/OT/SLP PROGRESS
Occupational Therapy      Patient Name:  Hugo Lockhart   MRN:  [id number]    Patient not seen today secondary to Unavailable (Comment)(patient to go for prostate biopsy). Will follow-up as able.    CECI Preston, MS  1/9/2019

## 2019-01-09 NOTE — BRIEF OP NOTE
[facility]  Brief Operative Note    SUMMARY     Surgery Date: 1/9/2019     Surgeon(s) and Role:     * AMELIA Vasques MD - Primary    Assisting Surgeon: None    Pre-op Diagnosis:  Acute cystitis without hematuria [N30.00]    Post-op Diagnosis:  Post-Op Diagnosis Codes:     * Acute cystitis without hematuria [N30.00]    Procedure(s) (LRB):  BIOPSY, PROSTATE, RECTAL APPROACH, WITH US GUIDANCE (N/A)    Anesthesia: General    Description of Procedure: 56 gm prostate, 12 cores taken    Description of the findings of the procedure: as above    Estimated Blood Loss: * No values recorded between 1/9/2019  1:18 PM and 1/9/2019  1:32 PM *         Specimens:   Specimen (12h ago, onward)    Start     Ordered    01/09/19 1232  Specimen to Pathology - Surgery  Once     Comments:  Left prostate bx base lateralLeft prostate bx base medialLeft prostate bx mid lateral Left prostate bx mid medial Left prostate bx apex lateralLeft prostate bx apex medialRight prostate bx base lateral Right prostate bx base medialRight prostate bx mid lateral Right prostate bx mid medialRight prostate bx apex lateralRight prostate bx apex medial     Start Status   01/09/19 1232 Collected (01/09/19 1328)       01/09/19 1328

## 2019-01-09 NOTE — PROGRESS NOTES
[facility] Hospital Medicine  Progress Note    Patient Name: Hugo Lockhart  MRN: [id number]  Patient Class: IP- Inpatient   Admission Date: 1/2/2019  Length of Stay: 7 days  Attending Physician: Mj Esteves MD  Primary Care Provider: [facility]        Subjective:     Principal Problem:Closed displaced intertrochanteric fracture of right femur    HPI:  Mr. Lockhart is a 72 yo M with a history of stroke and dementia, who presents from home per family with a R hip fracture on 1/2/19. The patient is not able to give any history due to dementia. The history was obtained per my discussion with the ED staff as well as review of the ED record.  This patient was just discharged from this hospital on 12/18/18 for suspected urinary retention and possible prostatitis.  No further history/details available about patient's presenting complaint. There was an obvious deformity of the R hip on presentation to the ED. The patient was also found to have a unstageable sacral decub on admission.      Hospital Course:  Mr. Lockhart is a 72 yo M with a history of stroke and dementia, who presents from home per family with a R hip fracture on 1/2/19.  Ortho was consulted. The patient is from home with family. He was also found to have a unstageable sacral decub. The patient's step daughter requested palliative care consult on 1/3/19.  The patient went for IM nailing of R femur on 1/3/19. Oncology was consulted secondary to elevated PSA and lytic rib lesions found on X-ray. They recommended urology consult.  Urology is considering prostate Bx. Oncology ordered bone scan highly suggestive of metastatic disease to bilateral ribs and could not r/o pathologic R hip fracture.  PT/OT noted little progress as sessions limited by pain and dementia.  Palliative care met with the family on 1/7/19.     Interval History: No new issues     Review of Systems   Unable to perform ROS: Dementia     Objective:     Vital  Signs (Most Recent):  Temp: 97.8 °F (36.6 °C) (01/09/19 0736)  Pulse: 82 (01/09/19 0736)  Resp: 18 (01/09/19 0736)  BP: (!) 145/75 (01/09/19 0736)  SpO2: 96 % (01/09/19 0736) Vital Signs (24h Range):  Temp:  [97.8 °F (36.6 °C)-99 °F (37.2 °C)] 97.8 °F (36.6 °C)  Pulse:  [75-93] 82  Resp:  [18] 18  SpO2:  [95 %-97 %] 96 %  BP: (105-145)/(57-78) 145/75     Weight: 50.8 kg (111 lb 15.9 oz)  Body mass index is 18.08 kg/m².    Intake/Output Summary (Last 24 hours) at 1/9/2019 1000  Last data filed at 1/9/2019 0819  Gross per 24 hour   Intake 1815 ml   Output 200 ml   Net 1615 ml      Physical Exam   Constitutional: He appears well-developed and well-nourished.   Cardiovascular: Normal rate and regular rhythm.   Pulmonary/Chest: Breath sounds normal.   Neurological: He is alert.   Skin: Skin is warm and dry.   Nursing note and vitals reviewed.      Significant Labs:   BMP:   Recent Labs   Lab 01/08/19  0549 01/09/19  0539   GLU 88  --      --    K 3.7  --      --    CO2 27  --    BUN 3*  --    CREATININE 0.5  --    CALCIUM 7.3*  --    MG 1.5*  1.5* 1.8  1.8     CBC:   Recent Labs   Lab 01/08/19  0549   WBC 9.73   HGB 8.3*   HCT 24.6*   *       Significant Imaging:     Assessment/Plan:      * Closed displaced intertrochanteric fracture of right femur    Not sure of acuity as unable to get history.  Ortho consulted.  Patient is very frail and thin. Will leave up to ortho if appropriate for surgery.  I would expect a possible difficult recovery.  At least for now, no cardio/pulmonary active issues. As of now, patient is supposed to have a nailing done but daughter has many questions for ortho.  S/P IM nailing of R femur on 1/3/19  Not sure of dispo- SNF?  But PT/OT state little progress as sessions limited by pain and dementia.  May end up being hospice in the end. Family meeting with palliative care Wed.        Elevated PSA    As above.        Lytic lesion of bone on x-ray    Bone scan confirms.          Anemia of chronic disease    No acute issues         BPH with urinary obstruction    Recent evaluation 12/18. Sent home on flomax.  Possible prostate CA and the patient was to follow up with urology. Noted lytic bone lesions on x-ray.  Will check a PSA.  Consider further consultation.  PSA 10.8. Consulted oncology. Palliative care also consulted.  Oncology will proceed with bone scan. 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 Possible pathologic R hip fx.  Biopsy of prostate pending.       History of CVA (cerebrovascular accident)    No acute issues        Dementia with behavioral disturbance    No acute issues.          VTE Risk Mitigation (From admission, onward)        Ordered     Place TANYA hose  Until discontinued      01/03/19 1510     Place sequential compression device  Until discontinued      01/03/19 1510     IP VTE HIGH RISK PATIENT  Once      01/03/19 1510        Family meeting today with palliative care. Patient very appropriate for home hospice.         Mj Robin MD  Department of Hospital Medicine   [facility]

## 2019-01-09 NOTE — ASSESSMENT & PLAN NOTE
- Concern for prostate cancer   - May also be related to UTI/catheter   - His PSA is only 10.8 and down from 13.4, these numbers are not consistent with metastatic prostate cancer unless he has an extremely high grade tumor.     - Consider for prostate biopsy - risks of infection, bleeding, etc discussed with pt's family.  Discussed this again on 1/4, they want to proceed with a biopsy despite the risks of infection.    Will start Cipro today for biopsy today  Need to hold Lovenox on today  Consent obtained with his daughter.  She understands the risks of bleeding and infection.    Enema before procedure  NPO until after procedure

## 2019-01-09 NOTE — ANESTHESIA PREPROCEDURE EVALUATION
01/09/2019  Hugo Lockhart is a 73 y.o., male.    Anesthesia Evaluation    I have reviewed the Patient Summary Reports.    I have reviewed the Nursing Notes.   I have reviewed the Medications.     Review of Systems  Anesthesia Hx:  No problems with previous Anesthesia  History of prior surgery of interest to airway management or planning: Previous anesthesia: General  Denies Personal Hx of Anesthesia complications.   Hematology/Oncology:         -- Anemia:   Cardiovascular:  Cardiovascular Normal     Pulmonary:  Pulmonary Normal    Renal/:  Renal/ Normal  Urinary frequency   Hepatic/GI:  Hepatic/GI Normal    Neurological:   CVA dementia   Endocrine:  Endocrine Normal    Psych:   Psychiatric History        Patient Active Problem List   Diagnosis    Acute encephalopathy    Dementia with behavioral disturbance    History of CVA (cerebrovascular accident)    BPH with urinary obstruction    Urinary frequency    Closed displaced intertrochanteric fracture of right femur    Anemia of chronic disease    Unstageable pressure ulcer of sacral region    Lytic lesion of bone on x-ray    Elevated PSA    Severe malnutrition     Past Medical History:   Diagnosis Date    Closed displaced intertrochanteric fracture of right femur 01/02/2019    Dementia     Pressure ulcer     01/02/2019, sacral spine with eschar    Requires assistance with all daily activities     Stroke     Unsteady gait      Past Surgical History:   Procedure Laterality Date    INSERTION, INTRAMEDULLARY ZAKIYA, FEMUR Right 1/3/2019    Performed by Tavares Gao MD at [facility] OR         Physical Exam  General:  Well nourished    Airway/Jaw/Neck:  Airway Findings: Mouth Opening: Normal Tongue: Normal  General Airway Assessment: Adult  Mallampati: II  TM Distance: Normal, at least 6 cm  Jaw/Neck Findings:  Neck ROM: Normal ROM       Dental:  Dental Findings: In tact   Chest/Lungs:  Chest/Lungs Findings: Clear to auscultation     Heart/Vascular:  Heart Findings: Rate: Normal  Rhythm: Regular Rhythm  Sounds: Normal        Mental Status:  Mental Status Findings:  Cooperative, Alert and Oriented         Anesthesia Plan  Type of Anesthesia, risks & benefits discussed:  Anesthesia Type:  general  Patient's Preference:   Intra-op Monitoring Plan: standard ASA monitors  Intra-op Monitoring Plan Comments:   Post Op Pain Control Plan: per primary service following discharge from PACU  Post Op Pain Control Plan Comments:   Induction:   IV  Beta Blocker:  Patient is not currently on a Beta-Blocker (No further documentation required).       Informed Consent: Patient understands risks and agrees with Anesthesia plan.  Questions answered. Anesthesia consent signed with patient.  ASA Score: 3     Day of Surgery Review of History & Physical:    H&P update referred to the surgeon.         Ready For Surgery From Anesthesia Perspective.

## 2019-01-09 NOTE — PLAN OF CARE
Problem: Adult Inpatient Plan of Care  Goal: Plan of Care Review  Outcome: Ongoing (interventions implemented as appropriate)   01/09/19 3665   Plan of Care Review   Plan of Care Reviewed With patient;daughter   No falls, trauma or injury this shift. Bedrest maintained. Patient turned every 2 hours. Pain managed with IV Morphine every 4 hours as needed. Wound care as per orders. Continue with care plan and continue to monitor patient.

## 2019-01-09 NOTE — NURSING
"Informed Dr. Esteves I gave PT his AM meds even tho he was NPO.  I informed him I did not give the poyethylene glycol packet.  He stated, "that's O.K.".  And hung up the phone.  "

## 2019-01-10 LAB
MAGNESIUM SERPL-MCNC: 1.6 MG/DL
MAGNESIUM SERPL-MCNC: 1.6 MG/DL

## 2019-01-10 PROCEDURE — 63600175 PHARM REV CODE 636 W HCPCS: Performed by: HOSPITALIST

## 2019-01-10 PROCEDURE — 25000003 PHARM REV CODE 250: Performed by: EMERGENCY MEDICINE

## 2019-01-10 PROCEDURE — 11000001 HC ACUTE MED/SURG PRIVATE ROOM

## 2019-01-10 PROCEDURE — 83735 ASSAY OF MAGNESIUM: CPT

## 2019-01-10 PROCEDURE — 36415 COLL VENOUS BLD VENIPUNCTURE: CPT

## 2019-01-10 PROCEDURE — 99232 SBSQ HOSP IP/OBS MODERATE 35: CPT | Mod: ,,, | Performed by: UROLOGY

## 2019-01-10 PROCEDURE — 25000003 PHARM REV CODE 250: Performed by: UROLOGY

## 2019-01-10 PROCEDURE — 99232 PR SUBSEQUENT HOSPITAL CARE,LEVL II: ICD-10-PCS | Mod: ,,, | Performed by: UROLOGY

## 2019-01-10 PROCEDURE — 25000003 PHARM REV CODE 250: Performed by: ORTHOPAEDIC SURGERY

## 2019-01-10 RX ORDER — MORPHINE SULFATE 10 MG/ML
4 INJECTION INTRAMUSCULAR; INTRAVENOUS; SUBCUTANEOUS
Status: DISCONTINUED | OUTPATIENT
Start: 2019-01-10 | End: 2019-01-11 | Stop reason: HOSPADM

## 2019-01-10 RX ADMIN — ATORVASTATIN CALCIUM 40 MG: 40 TABLET, FILM COATED ORAL at 08:01

## 2019-01-10 RX ADMIN — MORPHINE SULFATE 4 MG: 10 INJECTION INTRAVENOUS at 08:01

## 2019-01-10 RX ADMIN — MORPHINE SULFATE 4 MG: 10 INJECTION INTRAVENOUS at 09:01

## 2019-01-10 RX ADMIN — MORPHINE SULFATE 4 MG: 10 INJECTION INTRAVENOUS at 05:01

## 2019-01-10 RX ADMIN — MORPHINE SULFATE 4 MG: 10 INJECTION INTRAVENOUS at 01:01

## 2019-01-10 RX ADMIN — PHENAZOPYRIDINE HYDROCHLORIDE 200 MG: 100 TABLET ORAL at 05:01

## 2019-01-10 RX ADMIN — PHENAZOPYRIDINE HYDROCHLORIDE 200 MG: 100 TABLET ORAL at 08:01

## 2019-01-10 RX ADMIN — DOCUSATE SODIUM 100 MG: 100 CAPSULE, LIQUID FILLED ORAL at 08:01

## 2019-01-10 RX ADMIN — POLYETHYLENE GLYCOL 3350 17 G: 17 POWDER, FOR SOLUTION ORAL at 08:01

## 2019-01-10 RX ADMIN — PHENAZOPYRIDINE HYDROCHLORIDE 200 MG: 100 TABLET ORAL at 11:01

## 2019-01-10 NOTE — PROGRESS NOTES
[facility]  Urology  Progress Note    Patient Name: Hugo Lockhart  MRN: [id number]  Admission Date: 1/2/2019  Hospital Length of Stay: 8 days  Code Status: DNR   Attending Provider: Mj Esteves MD   Primary Care Physician: [facility]    Subjective:     HPI:  72yo M with elevated PSA, lytic bone lesions. Dr Vasques saw pt as inpatient consultation 12/18/18 for similar as well as possible UR. Recommended possible prostate biopsy as outpatient. Pt scheduled to see me (Dr. Mccarthy) later this month. He is now admitted for R femur fracture.     PSA 12/18/18 - 13.4, 1/2/19 - 10.8. Concern for prostatitis in 12/18.      History is otherwise limited 2/2 dementia. History provided by chart and pt's daughter.     Interval History: Prostate biopsy performed yesterday. Family not at bedside.     Review of Systems   Unable to perform ROS: Dementia     Objective:     Temp:  [97.1 °F (36.2 °C)-98.2 °F (36.8 °C)] 97.7 °F (36.5 °C)  Pulse:  [75-91] 82  Resp:  [14-22] 18  SpO2:  [93 %-98 %] 94 %  BP: (129-169)/(66-89) 169/83     Body mass index is 18.08 kg/m².       Post Void Cath Residual (mL): 36 mL (01/06/19 1905)    Drains          None          Physical Exam   Vitals reviewed.  Constitutional: He appears well-developed. No distress.   Thin   HENT:   Head: Normocephalic and atraumatic.   Eyes: Right eye exhibits no discharge. Left eye exhibits no discharge. No scleral icterus.   Neck: Normal range of motion. Neck supple.   Cardiovascular: Normal rate and regular rhythm.    Pulmonary/Chest: Effort normal and breath sounds normal. No respiratory distress.   Abdominal: Bowel sounds are normal. He exhibits no distension. There is no tenderness. There is no rebound and no guarding.   Genitourinary:   Genitourinary Comments: Smith removed  Bladder nonpalpable   Skin: Skin is warm and dry. He is not diaphoretic. No erythema.         Significant Labs:    BMP:  Recent Labs   Lab 01/06/19  0511  01/07/19  0748 01/07/19  0930 01/08/19  0549    140  --  138   K 3.1* 4.2 3.4* 3.7    110  --  104   CO2 29 25  --  27   BUN 4* 4*  --  3*   CREATININE 0.5 0.5  --  0.5   CALCIUM 7.4* 7.2*  --  7.3*       CBC:   Recent Labs   Lab 01/06/19  0529 01/07/19  0930 01/08/19  0549   WBC 11.71 9.87 9.73   HGB 8.4*  8.4* 8.9*  8.9* 8.3*   HCT 24.9*  24.9* 27.8*  27.8* 24.6*    412* 373*       Blood Culture: No results for input(s): LABBLOO in the last 168 hours.  Urine Culture: No results for input(s): LABURIN in the last 168 hours.  Urine Studies: No results for input(s): COLORU, APPEARANCEUA, PHUR, SPECGRAV, PROTEINUA, GLUCUA, KETONESU, BILIRUBINUA, OCCULTUA, NITRITE, UROBILINOGEN, LEUKOCYTESUR, RBCUA, WBCUA, BACTERIA, SQUAMEPITHEL, HYALINECASTS in the last 168 hours.    Invalid input(s): WRIGHTSUR    Significant Imaging:  All pertinent imaging results/findings from the past 24 hours have been reviewed.          Assessment/Plan:     Elevated PSA     - Concern for prostate cancer   - May also be related to UTI/catheter   - His PSA is only 10.8 and down from 13.4, these numbers are not consistent with metastatic prostate cancer unless he has an extremely high grade tumor.     - Consider for prostate biopsy - risks of infection, bleeding, etc discussed with pt's family.  Discussed this again on 1/4, they want to proceed with a biopsy despite the risks of infection.     - Cipro to continue for today, okay to stop tomorrow   - Expect blood in urine/stool     BPH with urinary obstruction     - Smith removed 1/5/19   - PVR low       Will sign off for now. Urology will follow up with pt and family when biopsy results returned (usually about one week).     VTE Risk Mitigation (From admission, onward)        Ordered     IP VTE HIGH RISK PATIENT  Once      01/09/19 1546     Place sequential compression device  Until discontinued      01/09/19 1546     Place TANYA hose  Until discontinued      01/03/19 151      Place sequential compression device  Until discontinued      01/03/19 2751          Melissa Mccarthy MD  Urology  [facility]

## 2019-01-10 NOTE — SUBJECTIVE & OBJECTIVE
Interval History: Prostate biopsy performed yesterday. Family not at bedside.     Review of Systems   Unable to perform ROS: Dementia     Objective:     Temp:  [97.1 °F (36.2 °C)-98.2 °F (36.8 °C)] 97.7 °F (36.5 °C)  Pulse:  [75-91] 82  Resp:  [14-22] 18  SpO2:  [93 %-98 %] 94 %  BP: (129-169)/(66-89) 169/83     Body mass index is 18.08 kg/m².       Post Void Cath Residual (mL): 36 mL (01/06/19 1905)    Drains          None          Physical Exam   Vitals reviewed.  Constitutional: He appears well-developed. No distress.   Thin   HENT:   Head: Normocephalic and atraumatic.   Eyes: Right eye exhibits no discharge. Left eye exhibits no discharge. No scleral icterus.   Neck: Normal range of motion. Neck supple.   Cardiovascular: Normal rate and regular rhythm.    Pulmonary/Chest: Effort normal and breath sounds normal. No respiratory distress.   Abdominal: Bowel sounds are normal. He exhibits no distension. There is no tenderness. There is no rebound and no guarding.   Genitourinary:   Genitourinary Comments: Smith removed  Bladder nonpalpable   Skin: Skin is warm and dry. He is not diaphoretic. No erythema.         Significant Labs:    BMP:  Recent Labs   Lab 01/06/19  0529 01/07/19  0748 01/07/19  0930 01/08/19  0549    140  --  138   K 3.1* 4.2 3.4* 3.7    110  --  104   CO2 29 25  --  27   BUN 4* 4*  --  3*   CREATININE 0.5 0.5  --  0.5   CALCIUM 7.4* 7.2*  --  7.3*       CBC:   Recent Labs   Lab 01/06/19  0529 01/07/19  0930 01/08/19  0549   WBC 11.71 9.87 9.73   HGB 8.4*  8.4* 8.9*  8.9* 8.3*   HCT 24.9*  24.9* 27.8*  27.8* 24.6*    412* 373*       Blood Culture: No results for input(s): LABBLOO in the last 168 hours.  Urine Culture: No results for input(s): LABURIN in the last 168 hours.  Urine Studies: No results for input(s): COLORU, APPEARANCEUA, PHUR, SPECGRAV, PROTEINUA, GLUCUA, KETONESU, BILIRUBINUA, OCCULTUA, NITRITE, UROBILINOGEN, LEUKOCYTESUR, RBCUA, WBCUA, BACTERIA, SQUAMEPITHEL,  HYALINECASTS in the last 168 hours.    Invalid input(s): BUBBA    Significant Imaging:  All pertinent imaging results/findings from the past 24 hours have been reviewed.

## 2019-01-10 NOTE — PROGRESS NOTES
[facility] Hospital Medicine  Progress Note    Patient Name: Hugo Lockhart  MRN: [id number]  Patient Class: IP- Inpatient   Admission Date: 1/2/2019  Length of Stay: 8 days  Attending Physician: jM Esteves MD  Primary Care Provider: [facility]        Subjective:     Principal Problem:Closed displaced intertrochanteric fracture of right femur    HPI:  Mr. Lockhart is a 72 yo M with a history of stroke and dementia, who presents from home per family with a R hip fracture on 1/2/19. The patient is not able to give any history due to dementia. The history was obtained per my discussion with the ED staff as well as review of the ED record.  This patient was just discharged from this hospital on 12/18/18 for suspected urinary retention and possible prostatitis.  No further history/details available about patient's presenting complaint. There was an obvious deformity of the R hip on presentation to the ED. The patient was also found to have a unstageable sacral decub on admission.      Hospital Course:  Mr. Lockhart is a 72 yo M with a history of stroke and dementia, who presents from home per family with a R hip fracture on 1/2/19.  Ortho was consulted. The patient is from home with family. He was also found to have a unstageable sacral decub. The patient's step daughter requested palliative care consult on 1/3/19.  The patient went for IM nailing of R femur on 1/3/19. Oncology was consulted secondary to elevated PSA and lytic rib lesions found on X-ray. They recommended urology consult.  Urology is considering prostate Bx. Oncology ordered bone scan highly suggestive of metastatic disease to bilateral ribs and could not r/o pathologic R hip fracture.  PT/OT noted little progress as sessions limited by pain and dementia.  Palliative care met with the family on 1/7/19 and the patient is now DNR. Further, [facility] met with the family on 1/10. Patient had a prostate Bx by  urology on 1/9/19.     Interval History: No new issues     Review of Systems   Unable to perform ROS: Dementia     Objective:     Vital Signs (Most Recent):  Temp: 98 °F (36.7 °C) (01/10/19 0755)  Pulse: 82 (01/10/19 0755)  Resp: 18 (01/10/19 0755)  BP: 138/80 (01/10/19 0755)  SpO2: 95 % (01/10/19 0755) Vital Signs (24h Range):  Temp:  [97.1 °F (36.2 °C)-98.2 °F (36.8 °C)] 98 °F (36.7 °C)  Pulse:  [75-91] 82  Resp:  [14-22] 18  SpO2:  [93 %-98 %] 95 %  BP: (129-169)/(66-89) 138/80     Weight: 50.8 kg (111 lb 15.9 oz)  Body mass index is 18.08 kg/m².    Intake/Output Summary (Last 24 hours) at 1/10/2019 0916  Last data filed at 1/10/2019 0625  Gross per 24 hour   Intake 1841 ml   Output --   Net 1841 ml      Physical Exam   Constitutional: He appears well-developed and well-nourished.   Cardiovascular: Normal rate and regular rhythm.   Pulmonary/Chest: Breath sounds normal.   Neurological: He is alert.   Skin: Skin is warm and dry.   Nursing note and vitals reviewed.       Significant Labs:   BMP:   Recent Labs   Lab 01/10/19  0526   MG 1.6  1.6     CBC: No results for input(s): WBC, HGB, HCT, PLT in the last 48 hours.    Significant Imaging:    Assessment/Plan:      * Closed displaced intertrochanteric fracture of right femur    Not sure of acuity as unable to get history.  Ortho consulted.  Patient is very frail and thin. Will leave up to ortho if appropriate for surgery.  I would expect a possible difficult recovery.  At least for now, no cardio/pulmonary active issues. As of now, patient is supposed to have a nailing done but daughter has many questions for ortho.  S/P IM nailing of R femur on 1/3/19  Not sure of dispo- SNF?  But PT/OT state little progress as sessions limited by pain and dementia.  May end up being hospice in the end. Family meeting with palliative care Wed.     Not making progress due to pain and dementia issues. Likely to hospice 1/10 or 1/11.          Elevated PSA    As above.        Lytic  lesion of bone on x-ray    Bone scan confirms.         Anemia of chronic disease    No acute issues         BPH with urinary obstruction    Recent evaluation 12/18. Sent home on flomax.  Possible prostate CA and the patient was to follow up with urology. Noted lytic bone lesions on x-ray.  Will check a PSA.  Consider further consultation.  PSA 10.8. Consulted oncology. Palliative care also consulted.  Oncology will proceed with bone scan. 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 Possible pathologic R hip fx.  Biopsy of prostate pending.    S/p biopsy on 1/9.     History of CVA (cerebrovascular accident)    No acute issues        Dementia with behavioral disturbance    No acute issues.          VTE Risk Mitigation (From admission, onward)        Ordered     IP VTE HIGH RISK PATIENT  Once      01/09/19 1546     Place sequential compression device  Until discontinued      01/09/19 1546     Place TANYA hose  Until discontinued      01/03/19 1510     Place sequential compression device  Until discontinued      01/03/19 1510        Likely will be discharged to hospice today or by tomorrow.  Not sure if inpatient or home. [facility] meeting with family at this time. DNR.         Mj Robin MD  Department of Hospital Medicine   [facility]

## 2019-01-10 NOTE — ASSESSMENT & PLAN NOTE
- Concern for prostate cancer   - May also be related to UTI/catheter   - His PSA is only 10.8 and down from 13.4, these numbers are not consistent with metastatic prostate cancer unless he has an extremely high grade tumor.     - Consider for prostate biopsy - risks of infection, bleeding, etc discussed with pt's family.  Discussed this again on 1/4, they want to proceed with a biopsy despite the risks of infection.     - Cipro to continue for today, okay to stop tomorrow   - Expect blood in urine/stool

## 2019-01-10 NOTE — PLAN OF CARE
Problem: Adult Inpatient Plan of Care  Goal: Plan of Care Review  Pt pt disoriented, calls out in pain, afraid of repositioning. Sx dressing intact with no drainage noted. Pt fever free. Void bright orange urine dur to meds, continues to have loose brown stool.

## 2019-01-10 NOTE — ASSESSMENT & PLAN NOTE
Recent evaluation 12/18. Sent home on flomax.  Possible prostate CA and the patient was to follow up with urology. Noted lytic bone lesions on x-ray.  Will check a PSA.  Consider further consultation.  PSA 10.8. Consulted oncology. Palliative care also consulted.  Oncology will proceed with bone scan. 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 Possible pathologic R hip fx.  Biopsy of prostate pending.    S/p biopsy on 1/9.

## 2019-01-10 NOTE — ASSESSMENT & PLAN NOTE
Not sure of acuity as unable to get history.  Ortho consulted.  Patient is very frail and thin. Will leave up to ortho if appropriate for surgery.  I would expect a possible difficult recovery.  At least for now, no cardio/pulmonary active issues. As of now, patient is supposed to have a nailing done but daughter has many questions for ortho.  S/P IM nailing of R femur on 1/3/19  Not sure of dispo- SNF?  But PT/OT state little progress as sessions limited by pain and dementia.  May end up being hospice in the end. Family meeting with palliative care Wed.     Not making progress due to pain and dementia issues. Likely to hospice 1/10 or 1/11.

## 2019-01-10 NOTE — PROGRESS NOTES
PALLIATIVE CARE PROGRESS NOTE:    Notified by [facility] representative, Alyssa, that patient meets criteria for inpatient hospice and family has signed paperwork for them.  They will have a bed tomorrow. They want comfort measures and do not wish to pursue aggressive treatment.    Discussed above with Dr. Esteves.    Plan/recommendations:   Inpatient hospice with facility of family choice ([facility]) - tomorrow Friday 1/11/19   Palliative Care will follow as needed.     Cecilia done.      Luzmaria Brewer, BSN, RN, CCRN, CHPN   Palliative Care Nurse Coordinator   [facility]  [phone]

## 2019-01-10 NOTE — PROGRESS NOTES
Doctor states ok to change pt Morphine 4mg Iv from every 4 to every 3 hours. Order modified. Pt and daughter informed of change.

## 2019-01-10 NOTE — PT/OT/SLP DISCHARGE
Occupational Therapy Discharge Summary    Hugo Lockhart  MRN: [id number]   Principal Problem: Closed displaced intertrochanteric fracture of right femur      Patient Discharged from acute Occupational Therapy on 10/10/2019.      Assessment:      Patient being transferred to inpatient hospice.    Objective:     GOALS:   Multidisciplinary Problems     Occupational Therapy Goals        Problem: Occupational Therapy Goal    Goal Priority Disciplines Outcome Interventions   Occupational Therapy Goal     OT, PT/OT Ongoing (interventions implemented as appropriate)    Description:  Goals to be met by: 1/19/19    Patient will increase functional independence with ADLs by performing:    Feeding with Blucw-bb-Csj[place] and verbal cues.  UE Dressing with Stand-by Assistance and verbal cues.  Grooming while seated with Stand-by Assistance and verbal cues.  Sitting at edge of bed 15 minutes with Supervision to perfom ADLs with verbal cues.  Rolling right and left with moderate assist  Pt will participate in AAROM exercises with moderate assist 3 set of 7 to increase strength for functional mobility                         Reasons for Discontinuation of Therapy Services  Patient is unable to continue work toward goals because of medical or psychosocial complications.      Plan:     Patient Discharged to: Palliative Care/Hospice    CECI Preston MS  1/10/2019

## 2019-01-10 NOTE — PROGRESS NOTES
Pt pt daughter at bed side request additional dose for father's pain. Rn contact doctor Nasim and informed of pt pain and family request.

## 2019-01-10 NOTE — SUBJECTIVE & OBJECTIVE
Interval History: No new issues     Review of Systems   Unable to perform ROS: Dementia     Objective:     Vital Signs (Most Recent):  Temp: 98 °F (36.7 °C) (01/10/19 0755)  Pulse: 82 (01/10/19 0755)  Resp: 18 (01/10/19 0755)  BP: 138/80 (01/10/19 0755)  SpO2: 95 % (01/10/19 0755) Vital Signs (24h Range):  Temp:  [97.1 °F (36.2 °C)-98.2 °F (36.8 °C)] 98 °F (36.7 °C)  Pulse:  [75-91] 82  Resp:  [14-22] 18  SpO2:  [93 %-98 %] 95 %  BP: (129-169)/(66-89) 138/80     Weight: 50.8 kg (111 lb 15.9 oz)  Body mass index is 18.08 kg/m².    Intake/Output Summary (Last 24 hours) at 1/10/2019 0916  Last data filed at 1/10/2019 0625  Gross per 24 hour   Intake 1841 ml   Output --   Net 1841 ml      Physical Exam   Constitutional: He appears well-developed and well-nourished.   Cardiovascular: Normal rate and regular rhythm.   Pulmonary/Chest: Breath sounds normal.   Neurological: He is alert.   Skin: Skin is warm and dry.   Nursing note and vitals reviewed.       Significant Labs:   BMP:   Recent Labs   Lab 01/10/19  0526   MG 1.6  1.6     CBC: No results for input(s): WBC, HGB, HCT, PLT in the last 48 hours.    Significant Imaging:

## 2019-01-11 VITALS
TEMPERATURE: 97 F | SYSTOLIC BLOOD PRESSURE: 129 MMHG | DIASTOLIC BLOOD PRESSURE: 62 MMHG | HEART RATE: 82 BPM | RESPIRATION RATE: 16 BRPM | BODY MASS INDEX: 18 KG/M2 | OXYGEN SATURATION: 95 % | WEIGHT: 112 LBS | HEIGHT: 66 IN

## 2019-01-11 LAB
MAGNESIUM SERPL-MCNC: 1.6 MG/DL
MAGNESIUM SERPL-MCNC: 1.6 MG/DL

## 2019-01-11 PROCEDURE — 83735 ASSAY OF MAGNESIUM: CPT

## 2019-01-11 PROCEDURE — 25000003 PHARM REV CODE 250: Performed by: ORTHOPAEDIC SURGERY

## 2019-01-11 PROCEDURE — 25000003 PHARM REV CODE 250: Performed by: ANESTHESIOLOGY

## 2019-01-11 PROCEDURE — 25000003 PHARM REV CODE 250: Performed by: UROLOGY

## 2019-01-11 PROCEDURE — 36415 COLL VENOUS BLD VENIPUNCTURE: CPT

## 2019-01-11 PROCEDURE — 63600175 PHARM REV CODE 636 W HCPCS: Performed by: HOSPITALIST

## 2019-01-11 PROCEDURE — 25000003 PHARM REV CODE 250: Performed by: EMERGENCY MEDICINE

## 2019-01-11 RX ORDER — CIPROFLOXACIN 500 MG/1
500 TABLET ORAL EVERY 12 HOURS
Qty: 28 TABLET | Refills: 0 | Status: SHIPPED | OUTPATIENT
Start: 2019-01-11 | End: 2019-01-25

## 2019-01-11 RX ADMIN — POLYETHYLENE GLYCOL 3350 17 G: 17 POWDER, FOR SOLUTION ORAL at 08:01

## 2019-01-11 RX ADMIN — PHENAZOPYRIDINE HYDROCHLORIDE 200 MG: 100 TABLET ORAL at 08:01

## 2019-01-11 RX ADMIN — DOCUSATE SODIUM 100 MG: 100 CAPSULE, LIQUID FILLED ORAL at 08:01

## 2019-01-11 RX ADMIN — MORPHINE SULFATE 4 MG: 10 INJECTION INTRAVENOUS at 12:01

## 2019-01-11 RX ADMIN — ATORVASTATIN CALCIUM 40 MG: 40 TABLET, FILM COATED ORAL at 08:01

## 2019-01-11 RX ADMIN — PHENAZOPYRIDINE HYDROCHLORIDE 200 MG: 100 TABLET ORAL at 12:01

## 2019-01-11 RX ADMIN — SODIUM CHLORIDE: 0.9 INJECTION, SOLUTION INTRAVENOUS at 06:01

## 2019-01-11 RX ADMIN — MORPHINE SULFATE 4 MG: 10 INJECTION INTRAVENOUS at 04:01

## 2019-01-11 NOTE — PLAN OF CARE
01/10/19 1504   Medicare Message   Important Message from Medicare regarding Discharge Appeal Rights Given to patient/caregiver;Explained to patient/caregiver;Signed/date by patient/caregiver   Date IMM was signed 01/10/19   Time IMM was signed 1411

## 2019-01-11 NOTE — SUBJECTIVE & OBJECTIVE
Interval History: No new issues     Review of Systems   Unable to perform ROS: Dementia     Objective:     Vital Signs (Most Recent):  Temp: 98 °F (36.7 °C) (01/11/19 0810)  Pulse: 82 (01/11/19 0810)  Resp: 17 (01/11/19 0810)  BP: 137/69 (01/11/19 0810)  SpO2: (!) 93 % (01/11/19 0810) Vital Signs (24h Range):  Temp:  [97.3 °F (36.3 °C)-98.5 °F (36.9 °C)] 98 °F (36.7 °C)  Pulse:  [77-89] 82  Resp:  [17-18] 17  SpO2:  [93 %-97 %] 93 %  BP: (112-150)/(56-75) 137/69     Weight: 50.8 kg (111 lb 15.9 oz)  Body mass index is 18.08 kg/m².    Intake/Output Summary (Last 24 hours) at 1/11/2019 0936  Last data filed at 1/10/2019 1730  Gross per 24 hour   Intake 240 ml   Output --   Net 240 ml      Physical Exam   Constitutional: He appears well-developed and well-nourished.   Cardiovascular: Normal rate and regular rhythm.   Pulmonary/Chest: Breath sounds normal.   Neurological: He is alert.   Skin: Skin is warm and dry.   Nursing note and vitals reviewed.      Significant Labs:   BMP:   Recent Labs   Lab 01/11/19  0419   MG 1.6  1.6     CBC: No results for input(s): WBC, HGB, HCT, PLT in the last 48 hours.    Significant Imaging:

## 2019-01-11 NOTE — PHYSICIAN QUERY
PT Name: Hugo Lockhart  MR #: [id number]    Physician Query Form - Consultant Diagnosis Clarification     CDS/: Jessica Luna               Contact information:[phone]  This form is a permanent document in the medical record.     Query Date: January 10, 2019      By submitting this query, we are merely seeking further clarification of documentation.  Please utilize your independent clinical judgment when addressing the question(s) below.      The Medical record contains the following:   Diagnosis Supporting Clinical Information Location in Medical Record     Severe Protein-Calorie Malnutrition        Subcutaneous Fat Loss (Final Summary): severe protein-calorie malnutrition  Muscle Loss Evaluation (Final Summary): severe protein-calorie malnutrition       Registered Dietitian Nutrition Progress Notes 1/8/2018         Do you agree with the Consultants diagnosis of __Severe Protein- Calorie Malnutrition_    ________________________________?    [ x ] Yes   [  ] Yes, but it resolved prior to my assessment of the patient   [  ] No   [  ] Clinically insignificant   [  ] Other/Clarification of findings:   [  ] Clinically undetermined

## 2019-01-11 NOTE — PROGRESS NOTES
Nutr f/u eval due today.  Plans noted for pt to discharge to inpatient hospice today.  In light of this, no nutr interventions warranted.  RD to continue to f/u per protocol should pt remain hospitalized.

## 2019-01-11 NOTE — PLAN OF CARE
Problem: Adult Inpatient Plan of Care  Goal: Plan of Care Review  Outcome: Ongoing (interventions implemented as appropriate)   01/11/19 0251   Plan of Care Review   Plan of Care Reviewed With patient;daughter   No falls, trauma or injury this shift. Wound dressing dry and intact. Changed per orders. patient turned every 2 hours. Patient now on Hospice, IV morphine every three hours as needed for pain, being administered. Continue with plan of care and continute to monitor patient.s cons.

## 2019-01-11 NOTE — ASSESSMENT & PLAN NOTE
Not sure of acuity as unable to get history.  Ortho consulted.  Patient is very frail and thin. Will leave up to ortho if appropriate for surgery.  I would expect a possible difficult recovery.  At least for now, no cardio/pulmonary active issues. As of now, patient is supposed to have a nailing done but daughter has many questions for ortho.  S/P IM nailing of R femur on 1/3/19  Not sure of dispo- SNF?  But PT/OT state little progress as sessions limited by pain and dementia.  May end up being hospice in the end. Family meeting with palliative care Wed.     Not making progress due to pain and dementia issues. Likely to hospice 1/10 or 1/11.   To hospice today.

## 2019-01-11 NOTE — PLAN OF CARE
[facility]     Department of Hospital Medicine     [address] [place] [address]     [phone] after hours  [phone] fax                                   HOSPICE  ORDERS     01/11/2019    Admit to Hospice: [facility]  Inpatient Service     Diagnoses: Hip fracture/dementia   Active Hospital Problems    Diagnosis  POA    *Closed displaced intertrochanteric fracture of right femur [S72.141A]  Yes     Priority: 1 - High    Severe malnutrition [E43]  Yes     Malnutrition in the context of Chronic Illness/Injury     Related to (etiology):  Dementia     Signs and Symptoms (as evidenced by):     Body Fat Depletion: moderate and severe depletion of orbitals, triceps and thoracic and lumbar region   Muscle Mass Depletion: severe depletion of temples, clavicle region, scapular region, interosseous muscle and lower extremities      Interventions:  Collaboration with providers     Nutrition Diagnosis Status:  New          Unstageable pressure ulcer of sacral region [L89.150]  Yes    Lytic lesion of bone on x-ray [M89.9]  Yes    Elevated PSA [R97.20]  Yes    Anemia of chronic disease [D63.8]  Yes    BPH with urinary obstruction [N40.1, N13.8]  Yes    Dementia with behavioral disturbance [F03.91]  Yes     Chronic    History of CVA (cerebrovascular accident) [Z86.73]  Not Applicable     Chronic      Resolved Hospital Problems   No resolved problems to display.       Hospice Qualifying Diagnoses: hip fracture/dementia      Patient has a life expectancy < 6 months due to these conditions.    Vital Signs: Routine per Hospice Protocol.    Allergies:Review of patient's allergies indicates:  No Known Allergies    Diet: regular    Activities: bed bound     Nursing: Per Hospice Routine    Future Orders:  Hospice Medical Director may dictate new orders for comfortable care measures & sign death certificate.    Medications:         Hugo Lockhart   Home Medication  Instructions DRAGAN:[id number]    Printed on:01/11/19 0939   Medication Information                      atorvastatin (LIPITOR) 40 MG tablet  Take 40 mg by mouth once daily.             ciprofloxacin HCl (CIPRO) 500 MG tablet  Take 1 tablet (500 mg total) by mouth every 12 (twelve) hours. for 14 days             phenazopyridine (PYRIDIUM) 100 MG tablet  Take 100 mg by mouth 3 (three) times daily as needed for Pain.             tamsulosin (FLOMAX) 0.4 mg Cap  Take 1 capsule (0.4 mg total) by mouth once daily.                                    _________________________________  Mj Robin MD  01/11/2019

## 2019-01-11 NOTE — NURSING
Pt discharged per MD order. IV removed. Catheter tip intact. No distress noted.  Patient is discharging via stretcher with [facility] to [facility].  Report already called.  Discharge packet given to [facility] personnel.  VSS. Afebrile. No complains of pain, N/V, diarrhea, or SOB. Rx provided. Family with patient.

## 2019-01-11 NOTE — DISCHARGE SUMMARY
[facility] Hospital Medicine  Discharge Summary      Patient Name: Hugo Lockhart  MRN: [id number]  Admission Date: 1/2/2019  Hospital Length of Stay: 9 days  Discharge Date and Time:  01/11/2019 9:39 AM  Attending Physician: Mj Esteves MD   Discharging Provider: Mj Esteves MD  Primary Care Provider: [facility]      HPI:   Mr. Lockhart is a 72 yo M with a history of stroke and dementia, who presents from home per family with a R hip fracture on 1/2/19. The patient is not able to give any history due to dementia. The history was obtained per my discussion with the ED staff as well as review of the ED record.  This patient was just discharged from this hospital on 12/18/18 for suspected urinary retention and possible prostatitis.  No further history/details available about patient's presenting complaint. There was an obvious deformity of the R hip on presentation to the ED. The patient was also found to have a unstageable sacral decub on admission.      Procedure(s) (LRB):  BIOPSY, PROSTATE, RECTAL APPROACH, WITH US GUIDANCE (N/A)      Hospital Course:   Mr. Lockhart is a 72 yo M with a history of stroke and dementia, who presents from home per family with a R hip fracture on 1/2/19.  Ortho was consulted. The patient is from home with family. He was also found to have a unstageable sacral decub. The patient's step daughter requested palliative care consult on 1/3/19.  The patient went for IM nailing of R femur on 1/3/19. Oncology was consulted secondary to elevated PSA and lytic rib lesions found on X-ray. They recommended urology consult.  Urology is considering prostate Bx. Oncology ordered bone scan highly suggestive of metastatic disease to bilateral ribs and could not r/o pathologic R hip fracture.  PT/OT noted little progress as sessions limited by pain and dementia.  Palliative care met with the family on 1/7/19 and the patient is now DNR. Further, [facility] met with the family on 1/10. Patient had a prostate Bx by urology on 1/9/19. The patient will be discharged to [facility] today. Activity- bed bound. Diet- regular as tolerated. Follow up per hospice.      Consults:   Consults (From admission, onward)        Status Ordering Provider     Inpatient consult to Hematology/Oncology - [facility]  Once     Provider:  Rosalinda Chowdary MD    Completed SHANTANU ERICKSON     Inpatient consult to Orthopedic Surgery  Once     Provider:  Vijaya Gillespie MD    Completed DA STEWARD     Inpatient consult to Palliative Care  Once     Provider:  Corina Carter, NP    Completed SALEEM KRISTINA VZaire     Inpatient consult to Palliative Care  Once     Provider:  Luzmaria Brewer RN    Completed SHANTANU ERICKSON     Inpatient consult to Social Work  Once     Provider:  (Not yet assigned)    Acknowledged SHANTANU ERICKSON     Inpatient consult to Spiritual Care  Once     Provider:  (Not yet assigned)    Completed ROSSY MONGERIA VZaire     Inpatient consult to Spiritual Care  Once     Provider:  (Not yet assigned)    Completed SHANTANU ERICKSON     Inpatient consult to Urology  Once     Provider:  Melissa Mccarthy MD    Completed ROSALINDA CHOWDARY     IP consult case management/social work  Once     Provider:  (Not yet assigned)    Acknowledged VIJAYA GILLESPIE     IP consult to case management  Once     Provider:  (Not yet assigned)    Acknowledged DA STEWARD          No new Assessment & Plan notes have been filed under this hospital service since the last note was generated.  Service: Hospital Medicine    Final Active Diagnoses:    Diagnosis Date Noted POA    PRINCIPAL PROBLEM:  Closed displaced intertrochanteric fracture of right femur [S72.141A] 01/02/2019 Yes    Severe malnutrition [E43] 01/04/2019 Yes    Unstageable pressure ulcer of sacral region [L89.150] 01/03/2019 Yes    Lytic lesion of bone on x-ray [M89.9] 01/03/2019 Yes     Elevated PSA [R97.20] 01/03/2019 Yes    Anemia of chronic disease [D63.8] 01/02/2019 Yes    BPH with urinary obstruction [N40.1, N13.8] 12/18/2018 Yes    Dementia with behavioral disturbance [F03.91] 12/18/2018 Yes     Chronic    History of CVA (cerebrovascular accident) [Z86.73] 12/18/2018 Not Applicable     Chronic      Problems Resolved During this Admission:       Discharged Condition: good    Disposition:  Inpatient Hospice- [facility]   Follow Up:  Follow-up Information     Follow up In 1 week.               Patient Instructions:   No discharge procedures on file.    Significant Diagnostic Studies:     Pending Diagnostic Studies:     Procedure Component Value Units Date/Time    US Intraoperative [[id number]]     Order Status:  Sent Lab Status:  No result          Medications:  Reconciled Home Medications:      Medication List      CONTINUE taking these medications    atorvastatin 40 MG tablet  Commonly known as:  LIPITOR  Take 40 mg by mouth once daily.     ciprofloxacin HCl 500 MG tablet  Commonly known as:  CIPRO  Take 1 tablet (500 mg total) by mouth every 12 (twelve) hours. for 14 days     phenazopyridine 100 MG tablet  Commonly known as:  PYRIDIUM  Take 100 mg by mouth 3 (three) times daily as needed for Pain.     tamsulosin 0.4 mg Cap  Commonly known as:  FLOMAX  Take 1 capsule (0.4 mg total) by mouth once daily.            Indwelling Lines/Drains at time of discharge:   Lines/Drains/Airways     Airway                 Airway - Non-Surgical 01/09/19 1308 Nasal Cannula 1 day         Nasopharyngeal Airway 1 day          Pressure Ulcer                 Pressure Injury 01/02/19 1025 Right Scapula Stage 2 8 days         Pressure Injury 01/02/19 1025 Right lateral Hip Unstageable 8 days         Pressure Injury 01/02/19 1025 medial Coccyx Unstageable 8 days                Time spent on the discharge of patient:  > 30  minutes  Patient was seen and examined on the date of discharge and determined to be suitable  for discharge.         Mj Robin MD  Department of Hospital Medicine  [facility]

## 2019-01-11 NOTE — PLAN OF CARE
Problem: Adult Inpatient Plan of Care  Goal: Plan of Care Review  Outcome: Ongoing (interventions implemented as appropriate)  Discharge planning in progress. Will discharge to palliative care tomorrow. Requiring pain medicine q 3 hours. Incontinent on urine and stool. IVF continue. Monitor daily labs. Patient is a DNR. Family remains at bedside. Dressing to right hip changed due to old dressing soiled. Patient is not oriented. Frequent orientation needed.

## 2019-01-11 NOTE — PROGRESS NOTES
PALLIATIVE CARE PROGRESS NOTE:    Brief bedside visit.  Patient asleep, but awakens to name call.  At first says he does have pain and then says he does not - digresses to nonsensical speech.  No acute distress noted.    Son Kartik at bedside, states they are awaiting transfer to [facility] today.  No additional questions today and he verbalized appreciation for care and concern given.    Plan:   Transfer to inpatient [facility] today.   Focus on comfort.    Luzmaria Brewer, CHANELLN, RN, CCRN, CHPN   Palliative Care Nurse Coordinator   [facility]  [phone]

## 2019-01-11 NOTE — PROGRESS NOTES
Asked to go to room by Palliative Care Nurse Bushra Brewer as pts daughter had complaints about the floor being dirty. Daughter also pointed to wall and stated there is even stool on the wall. She also stated she did not want Janet LPN to take care of her father today. Housekeeping contacted to reclean room. Nursing assignment changed to Nhi CHAUDHARY. Wall cleaned at this time (appears to be medicine when wiped, wall cleansed with Sani-wipes) Pts daughter appears satisfied at this time. Pt repositioned for comfort.

## 2019-01-11 NOTE — PLAN OF CARE
01/11/19 1301   Final Note   Assessment Type Final Discharge Note   Anticipated Discharge Disposition [facility]   What phone number can be called within the next 1-3 days to see how you are doing after discharge? [phone]   Right Care Referral Info   Post Acute Recommendation Other  (Hospice/Inpatient)   Referral Type Hospice    Facility Name [address]      RICKIE received call report information from Alyssa (Devora). According to Alyssa, pt will transfer to room 2, and the nurse can call report to [phone].     RICKIE spoke with pt son, Kartik, at pt bedside, and explained pt has discharge orders, pt will transport to [facility] today, and SW will arrange transport for 2:00PM. Kartik verbalized understanding.    12:58PM  RICKIE contacted Glenn @ [phone] to arrange transportation. RICKIE spoke with Alyssa, ETA is within the hour.     1:10PM  RICKIE provided pt nurse, Sandy, with pt travel packet and call report information. RICKIE informed [facility] ETA is 2:00PM.

## 2019-01-11 NOTE — PROGRESS NOTES
[facility] Hospital Medicine  Progress Note    Patient Name: Hugo Lockhart  MRN: [id number]  Patient Class: IP- Inpatient   Admission Date: 1/2/2019  Length of Stay: 9 days  Attending Physician: Mj Esteves MD  Primary Care Provider: [facility]        Subjective:     Principal Problem:Closed displaced intertrochanteric fracture of right femur    HPI:  Mr. Lockhart is a 72 yo M with a history of stroke and dementia, who presents from home per family with a R hip fracture on 1/2/19. The patient is not able to give any history due to dementia. The history was obtained per my discussion with the ED staff as well as review of the ED record.  This patient was just discharged from this hospital on 12/18/18 for suspected urinary retention and possible prostatitis.  No further history/details available about patient's presenting complaint. There was an obvious deformity of the R hip on presentation to the ED. The patient was also found to have a unstageable sacral decub on admission.      Hospital Course:  Mr. Lockhart is a 72 yo M with a history of stroke and dementia, who presents from home per family with a R hip fracture on 1/2/19.  Ortho was consulted. The patient is from home with family. He was also found to have a unstageable sacral decub. The patient's step daughter requested palliative care consult on 1/3/19.  The patient went for IM nailing of R femur on 1/3/19. Oncology was consulted secondary to elevated PSA and lytic rib lesions found on X-ray. They recommended urology consult.  Urology is considering prostate Bx. Oncology ordered bone scan highly suggestive of metastatic disease to bilateral ribs and could not r/o pathologic R hip fracture.  PT/OT noted little progress as sessions limited by pain and dementia.  Palliative care met with the family on 1/7/19 and the patient is now DNR. Further, [facility] met with the family on 1/10. Patient had a prostate Bx by  urology on 1/9/19. The patient will be discharged to [facility] today. Activity- bed bound. Diet- regular as tolerated. Follow up per hospice.     Interval History: No new issues     Review of Systems   Unable to perform ROS: Dementia     Objective:     Vital Signs (Most Recent):  Temp: 98 °F (36.7 °C) (01/11/19 0810)  Pulse: 82 (01/11/19 0810)  Resp: 17 (01/11/19 0810)  BP: 137/69 (01/11/19 0810)  SpO2: (!) 93 % (01/11/19 0810) Vital Signs (24h Range):  Temp:  [97.3 °F (36.3 °C)-98.5 °F (36.9 °C)] 98 °F (36.7 °C)  Pulse:  [77-89] 82  Resp:  [17-18] 17  SpO2:  [93 %-97 %] 93 %  BP: (112-150)/(56-75) 137/69     Weight: 50.8 kg (111 lb 15.9 oz)  Body mass index is 18.08 kg/m².    Intake/Output Summary (Last 24 hours) at 1/11/2019 0936  Last data filed at 1/10/2019 1730  Gross per 24 hour   Intake 240 ml   Output --   Net 240 ml      Physical Exam   Constitutional: He appears well-developed and well-nourished.   Cardiovascular: Normal rate and regular rhythm.   Pulmonary/Chest: Breath sounds normal.   Neurological: He is alert.   Skin: Skin is warm and dry.   Nursing note and vitals reviewed.      Significant Labs:   BMP:   Recent Labs   Lab 01/11/19  0419   MG 1.6  1.6     CBC: No results for input(s): WBC, HGB, HCT, PLT in the last 48 hours.    Significant Imaging:     Assessment/Plan:      * Closed displaced intertrochanteric fracture of right femur    Not sure of acuity as unable to get history.  Ortho consulted.  Patient is very frail and thin. Will leave up to ortho if appropriate for surgery.  I would expect a possible difficult recovery.  At least for now, no cardio/pulmonary active issues. As of now, patient is supposed to have a nailing done but daughter has many questions for ortho.  S/P IM nailing of R femur on 1/3/19  Not sure of dispo- SNF?  But PT/OT state little progress as sessions limited by pain and dementia.  May end up being hospice in the end. Family meeting with palliative care  Wed.     Not making progress due to pain and dementia issues. Likely to hospice 1/10 or 1/11.   To hospice today.          Elevated PSA    As above.        Lytic lesion of bone on x-ray    Bone scan confirms.         Anemia of chronic disease    No acute issues         BPH with urinary obstruction    Recent evaluation 12/18. Sent home on flomax.  Possible prostate CA and the patient was to follow up with urology. Noted lytic bone lesions on x-ray.  Will check a PSA.  Consider further consultation.  PSA 10.8. Consulted oncology. Palliative care also consulted.  Oncology will proceed with bone scan. 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 Possible pathologic R hip fx.  Biopsy of prostate pending.    S/p biopsy on 1/9.     History of CVA (cerebrovascular accident)    No acute issues        Dementia with behavioral disturbance    No acute issues.          VTE Risk Mitigation (From admission, onward)        Ordered     IP VTE HIGH RISK PATIENT  Once      01/09/19 1546     Place sequential compression device  Until discontinued      01/09/19 1546     Place TANYA hose  Until discontinued      01/03/19 1510     Place sequential compression device  Until discontinued      01/03/19 1510          Will d/c to inpatient hospice     Mj Robin MD  Department of Hospital Medicine   [facility]

## 2019-01-12 NOTE — PT/OT/SLP DISCHARGE
Physical Therapy Discharge Summary    Name: Hugo Lockhart  MRN: [id number]   Principal Problem: Closed displaced intertrochanteric fracture of right femur     Patient Discharged from acute Physical Therapy on 2019.  Please refer to prior PT noted date on 2019 for functional status.     Assessment:     Patient appropriate for care in another setting.    Objective:     GOALS:   Multidisciplinary Problems     Physical Therapy Goals        Problem: Physical Therapy Goal    Goal Priority Disciplines Outcome Goal Variances Interventions   Physical Therapy Goal     PT, PT/OT Ongoing (interventions implemented as appropriate)     Description:  Goals to be met by: 2019     Patient will increase functional independence with mobility by performin. Supine to sit with Modified [place]  2. Sit to supine with Modified [place]  3. Sit to stand transfer with Modified [place]  4. Gait  x 200 feet with Modified [place] using Rolling Walker.    Recommend: SNF at time of discharge.                          Reasons for Discontinuation of Therapy Services  Transfer to alternate level of care.      Plan:     Patient Discharged to: Palliative Care/Hospice.    Yi Hernandez, PT  2019

## 2019-01-23 ENCOUNTER — TELEPHONE (OUTPATIENT)
Dept: UROLOGY | Facility: CLINIC | Age: 74
End: 2019-01-23

## 2019-01-23 NOTE — TELEPHONE ENCOUNTER
I spoke to his daughter on the phone.  He was not able to make it to his follow up.  He is bed bound and now in hospice.      I relayed his pathology information.  I explained that this is a rare and aggressive form of cancer.  Treatment usually involves chemotherapy and no traditional prostate cancer treatments.    I will leave the pathology report and my card for her to  tomorrow.    All of her questions were answered.

## 2019-01-26 NOTE — PHYSICIAN QUERY
PT Name: Hugo Lockhart  MR #: [id number]     Physician Query Form - Diagnosis Clarification      CDS/: Jessica Luna               Contact information:[phone]    This form is a permanent document in the medical record.     Query Date: January 26, 2019    By submitting this query, we are merely seeking further clarification of documentation.  Please utilize your independent clinical judgment when addressing the question(s) below.     The medical record contains the following:      Findings Supporting Clinical Information Location in Medical Record     Prostate Carcinoma     FINAL PATHOLOGIC DIAGNOSIS  1. PROSTATE, LEFT BASE LATERAL, BIOPSY:  - Small focus of atypical glands, suspicious for prostatic carcinoma.  2. PROSTATE, LEFT BASE MEDIAL, BIOPSY:  - Benign prostate tissue.  3. PROSTATE, LEFT MID LATERAL, BIOPSY:  - Benign prostate tissue.  4. PROSTATE, LEFT MID MEDIAL, BIOPSY:  - Benign prostate tissue with acute inflammation.  5. PROSTATE, LEFT APEX LATERAL, BIOPSY:  - Benign prostate tissue.  6. PROSTATE, LEFT APEX MEDIAL, BIOPSY:  - Benign prostate tissue.  7. 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

## 2019-01-26 NOTE — PHYSICIAN QUERY
PT Name: Hugo Lockhart  MR #: [id number]     Physician Query Form - Diagnosis Clarification       CDS/: Jessica Luna               Contact information: [phone]    This form is a permanent document in the medical record.     Query Date: January 26, 2019    By submitting this query, we are merely seeking further clarification of documentation.  Please utilize your independent clinical judgment when addressing the question(s) below.     The medical record contains the following:      Findings Supporting Clinical Information Location in Medical Record     Bone Metastasis   Workup also included chest film which shows expansile lytic bone lesions involving the posterior aspects of the right 9th and 10th ribs concerning for metastatic disease.    Workup also included chest film which shows expansile lytic bone lesions involving the posterior aspects of the right 9th and 10th ribs concerning for metastatic disease.    Bone scan concerning for bony mets      Increased tracer accumulation within the ribs bilaterally and the region of the cephalad aspect of the right sacroiliac joint.  There is increased tracer accumulation in the region of the lytic bone lesions noted on the CT examination dated 12/18/2018.  Osseous metastatic disease should be strongly considered.     Hematology and Oncology Consults Notes 1/03/2019                  Hematology and Oncology Progress Notes 1/6/2019    Nuclear Medicine Bone Scan Whole Body Results 1/4/2018     Please clarify if the __Bone metastasis_________________________ diagnosis has been:    [  ] Ruled In   [  ] Ruled In, Now Resolved   [  ] Ruled Out   [  ] Other/Clarification of findings (please specify):   [  ] Clinically insignificant     [ x ] Clinically undetermined     Please document in your progress notes daily for the duration of treatment, until resolved, and include in your discharge summary.

## 2019-01-29 NOTE — PHYSICIAN QUERY
PT Name: Hugo Lockhart  MR #: [id number]     Physician Query Form - Diagnosis Clarification      CDS/: Jessica Luna               Contact information:[phone]    This form is a permanent document in the medical record.     Query Date: January 29, 2019    By submitting this query, we are merely seeking further clarification of documentation.  Please utilize your independent clinical judgment when addressing the question(s) below.     The medical record contains the following:      Findings Supporting Clinical Information Location in Medical Record     Right subtrochanteric and intertrochanteric femur   fracture.          presents to the ED via EMS from his home c/o an acute onset, severe and constant R hip pain with obvious deformity today. Per EMS, family denies any recent fall or trauma.    Oncology ordered bone scan highly suggestive of metastatic disease to bilateral ribs and could not r/o pathologic R hip fracture.         Emergency Medicine ED Provider Notes 1/2/2019        Hospital Medicine Progress Notes 1/6/2019     Please clarify if the __Pathologic R hip fracture_________________________ diagnosis has been:    [  ] Ruled In   [  ] Ruled In, Now Resolved   [  ] Ruled Out   [  ] Other/Clarification of findings (please specify):   [  ] Clinically insignificant     [ x ] Clinically undetermined     Please document in your progress notes daily for the duration of treatment, until resolved, and include in your discharge summary.

## 2019-03-12 NOTE — PROGRESS NOTES
[facility] Hospital Medicine  Progress Note    Patient Name: Hugo Lockhart  MRN: [id number]  Patient Class: IP- Inpatient   Admission Date: 1/2/2019  Length of Stay: 6 days  Attending Physician: Mj Esteves MD  Primary Care Provider: [facility]        Subjective:     Principal Problem:Closed displaced intertrochanteric fracture of right femur    HPI:  Mr. Lockhart is a 74 yo M with a history of stroke and dementia, who presents from home per family with a R hip fracture on 1/2/19. The patient is not able to give any history due to dementia. The history was obtained per my discussion with the ED staff as well as review of the ED record.  This patient was just discharged from this hospital on 12/18/18 for suspected urinary retention and possible prostatitis.  No further history/details available about patient's presenting complaint. There was an obvious deformity of the R hip on presentation to the ED. The patient was also found to have a unstageable sacral decub on admission.      Hospital Course:  Mr. Lockhart is a 74 yo M with a history of stroke and dementia, who presents from home per family with a R hip fracture on 1/2/19.  Ortho was consulted. The patient is from home with family. He was also found to have a unstageable sacral decub. The patient's step daughter requested palliative care consult on 1/3/19.  The patient went for IM nailing of R femur on 1/3/19. Oncology was consulted secondary to elevated PSA and lytic rib lesions found on X-ray. They recommended urology consult.  Urology is considering prostate Bx. Oncology ordered bone scan highly suggestive of metastatic disease to bilateral ribs and could not r/o pathologic R hip fracture.  PT/OT noted little progress as sessions limited by pain and dementia.  Palliative care met with the family on 1/7/19.     Interval History: No new issues     Review of Systems   Unable to perform ROS: Dementia     Objective:     Vital  Signs (Most Recent):  Temp: 97.8 °F (36.6 °C) (01/08/19 0754)  Pulse: 78 (01/08/19 0754)  Resp: 18 (01/08/19 0754)  BP: 111/74 (01/08/19 0754)  SpO2: 96 % (01/08/19 0754) Vital Signs (24h Range):  Temp:  [97.7 °F (36.5 °C)-98.4 °F (36.9 °C)] 97.8 °F (36.6 °C)  Pulse:  [76-93] 78  Resp:  [18] 18  SpO2:  [95 %-97 %] 96 %  BP: (111-139)/(56-74) 111/74     Weight: 49 kg (108 lb 0.4 oz)  Body mass index is 17.44 kg/m².    Intake/Output Summary (Last 24 hours) at 1/8/2019 0900  Last data filed at 1/8/2019 0700  Gross per 24 hour   Intake 2918 ml   Output --   Net 2918 ml      Physical Exam   Constitutional: He appears well-developed and well-nourished.   Cardiovascular: Normal rate and regular rhythm.   Pulmonary/Chest: Breath sounds normal.   Neurological: He is alert.   Skin: Skin is warm and dry.   Nursing note and vitals reviewed.      Significant Labs:   BMP:   Recent Labs   Lab 01/08/19  0549   GLU 88      K 3.7      CO2 27   BUN 3*   CREATININE 0.5   CALCIUM 7.3*   MG 1.5*  1.5*     CBC:   Recent Labs   Lab 01/07/19  0930 01/08/19  0549   WBC 9.87 9.73   HGB 8.9*  8.9* 8.3*   HCT 27.8*  27.8* 24.6*   * 373*       Significant Imaging:     Assessment/Plan:      * Closed displaced intertrochanteric fracture of right femur    Not sure of acuity as unable to get history.  Ortho consulted.  Patient is very frail and thin. Will leave up to ortho if appropriate for surgery.  I would expect a possible difficult recovery.  At least for now, no cardio/pulmonary active issues. As of now, patient is supposed to have a nailing done but daughter has many questions for ortho.  S/P IM nailing of R femur on 1/3/19  Not sure of dispo- SNF?  But PT/OT state little progress as sessions limited by pain and dementia.  May end up being hospice in the end. Family meeting with palliative care Wed.        Elevated PSA    As above.        Lytic lesion of bone on x-ray    Bone scan confirms.         Anemia of chronic  disease    No acute issues         BPH with urinary obstruction    Recent evaluation 12/18. Sent home on flomax.  Possible prostate CA and the patient was to follow up with urology. Noted lytic bone lesions on x-ray.  Will check a PSA.  Consider further consultation.  PSA 10.8. Consulted oncology. Palliative care also consulted.  Oncology will proceed with bone scan. 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 Possible pathologic R hip fx.  Biopsy of prostate pending.       History of CVA (cerebrovascular accident)    No acute issues        Dementia with behavioral disturbance    No acute issues.          VTE Risk Mitigation (From admission, onward)        Ordered     enoxaparin injection 40 mg  Daily      01/03/19 1510     Place TANYA hose  Until discontinued      01/03/19 1510     Place sequential compression device  Until discontinued      01/03/19 1510     IP VTE HIGH RISK PATIENT  Once      01/03/19 1510        Supportive care. Pain control. Family meeting on Wed.  Likely will need hospice in the end. Little progress with PT/OT.         Mj Robin MD  Department of Hospital Medicine   [facility]   Patient

## (undated) DEVICE — ELECTRODE REM PLYHSV RETURN 9

## (undated) DEVICE — DRAPE STERI-DRAPE 83X125 IOBAN

## (undated) DEVICE — GOWN B1 X-LG X-LONG

## (undated) DEVICE — CANISTER SUCTION 2 LTR

## (undated) DEVICE — GUN BIOPSY 18GA MONOPLY

## (undated) DEVICE — GUIDE WIRE 3.0X1000MM BALL TIP
Type: IMPLANTABLE DEVICE | Site: HIP | Status: NON-FUNCTIONAL
Removed: 2019-01-03

## (undated) DEVICE — SEE MEDLINE ITEM 146292

## (undated) DEVICE — BIT DRILL T2 4.2 X 180MM L

## (undated) DEVICE — SEE MEDLINE ITEM 152487

## (undated) DEVICE — CLIPPER BLADE MOD 4406 (CAREF)

## (undated) DEVICE — SEE MEDLINE ITEM 107746

## (undated) DEVICE — BLANKET UPPER BODY 78.7X29.9IN

## (undated) DEVICE — UNDERGLOVE BIOGEL PI SZ 6.5 LF

## (undated) DEVICE — SEE MEDLINE ITEM 157117

## (undated) DEVICE — CHLORAPREP W TINT 26ML APPL

## (undated) DEVICE — SOL FORMALIN PREFLD 10% 60ML

## (undated) DEVICE — SEE L#120831

## (undated) DEVICE — PAD TRACTION BOOT

## (undated) DEVICE — PAD ABD 8X10 STERILE

## (undated) DEVICE — KIT PT CARE HANA PROFX SSXT

## (undated) DEVICE — SEE MEDLINE ITEM 152622

## (undated) DEVICE — SUT 2/0 36IN COATED VICRYL

## (undated) DEVICE — JELLY LUBRICANT STERILE 4 OZ

## (undated) DEVICE — GAUZE SPONGE 4X4 12PLY

## (undated) DEVICE — GUIDEWIRE 3.2 X 450
Type: IMPLANTABLE DEVICE | Site: HIP | Status: NON-FUNCTIONAL
Removed: 2019-01-03

## (undated) DEVICE — GUIDE DUAL BIOPSY FOR 8818

## (undated) DEVICE — GOWN XX-LARGE

## (undated) DEVICE — GLOVE SURGICAL LATEX SZ 6.5

## (undated) DEVICE — SOL 9P NACL IRR PIC IL

## (undated) DEVICE — GLOVE SURGICAL LATEX SZ 8

## (undated) DEVICE — SUPPORT ULNA NERVE PROTECTOR

## (undated) DEVICE — Device

## (undated) DEVICE — GLOVE SURG BIOGEL LATEX SZ 7.5

## (undated) DEVICE — STAPLER SKIN PROXIMATE WIDE

## (undated) DEVICE — REAMER MOD BIXCUT 8X48MM STER.

## (undated) DEVICE — K-WIRE GAMMA 3.2MM DIAX450MM L
Type: IMPLANTABLE DEVICE | Site: HIP | Status: NON-FUNCTIONAL
Removed: 2019-01-03

## (undated) DEVICE — GLOVE BIOGEL ORTHOPEDIC 8

## (undated) DEVICE — BIT DRILL BN GAMMA 3 4.2MM DIA

## (undated) DEVICE — SEE MEDLINE ITEM 146345